# Patient Record
Sex: FEMALE | Race: WHITE | Employment: PART TIME | URBAN - METROPOLITAN AREA
[De-identification: names, ages, dates, MRNs, and addresses within clinical notes are randomized per-mention and may not be internally consistent; named-entity substitution may affect disease eponyms.]

---

## 2020-01-17 ENCOUNTER — HOSPITAL ENCOUNTER (OUTPATIENT)
Dept: PREADMISSION TESTING | Age: 70
Discharge: HOME | End: 2020-01-17
Payer: MEDICARE

## 2020-01-17 VITALS
HEIGHT: 64 IN | BODY MASS INDEX: 30.87 KG/M2 | OXYGEN SATURATION: 96 % | HEART RATE: 93 BPM | DIASTOLIC BLOOD PRESSURE: 69 MMHG | SYSTOLIC BLOOD PRESSURE: 140 MMHG | WEIGHT: 180.8 LBS | TEMPERATURE: 98.2 F | RESPIRATION RATE: 16 BRPM

## 2020-01-17 LAB
ALBUMIN SERPL-MCNC: 4.1 G/DL (ref 3.5–5)
ALBUMIN/GLOB SERPL: 1.3 {RATIO} (ref 1.1–2.2)
ALP SERPL-CCNC: 90 U/L (ref 45–117)
ALT SERPL-CCNC: 22 U/L (ref 12–78)
ANION GAP SERPL CALC-SCNC: 3 MMOL/L (ref 5–15)
APTT PPP: 25.7 SEC (ref 22.1–32)
AST SERPL-CCNC: 20 U/L (ref 15–37)
BASOPHILS # BLD: 0.1 K/UL (ref 0–0.1)
BASOPHILS NFR BLD: 1 % (ref 0–1)
BILIRUB SERPL-MCNC: 0.5 MG/DL (ref 0.2–1)
BUN SERPL-MCNC: 13 MG/DL (ref 6–20)
BUN/CREAT SERPL: 19 (ref 12–20)
CALCIUM SERPL-MCNC: 9 MG/DL (ref 8.5–10.1)
CHLORIDE SERPL-SCNC: 107 MMOL/L (ref 97–108)
CO2 SERPL-SCNC: 30 MMOL/L (ref 21–32)
CREAT SERPL-MCNC: 0.67 MG/DL (ref 0.55–1.02)
DIFFERENTIAL METHOD BLD: ABNORMAL
EOSINOPHIL # BLD: 0 K/UL (ref 0–0.4)
EOSINOPHIL NFR BLD: 1 % (ref 0–7)
ERYTHROCYTE [DISTWIDTH] IN BLOOD BY AUTOMATED COUNT: 12.2 % (ref 11.5–14.5)
GLOBULIN SER CALC-MCNC: 3.2 G/DL (ref 2–4)
GLUCOSE SERPL-MCNC: 96 MG/DL (ref 65–100)
HCT VFR BLD AUTO: 44.4 % (ref 35–47)
HGB BLD-MCNC: 14.3 G/DL (ref 11.5–16)
IMM GRANULOCYTES # BLD AUTO: 0.1 K/UL (ref 0–0.04)
IMM GRANULOCYTES NFR BLD AUTO: 1 % (ref 0–0.5)
INR PPP: 1 (ref 0.9–1.1)
LYMPHOCYTES # BLD: 1.7 K/UL (ref 0.8–3.5)
LYMPHOCYTES NFR BLD: 22 % (ref 12–49)
MCH RBC QN AUTO: 29.6 PG (ref 26–34)
MCHC RBC AUTO-ENTMCNC: 32.2 G/DL (ref 30–36.5)
MCV RBC AUTO: 91.9 FL (ref 80–99)
MONOCYTES # BLD: 0.5 K/UL (ref 0–1)
MONOCYTES NFR BLD: 7 % (ref 5–13)
NEUTS SEG # BLD: 5.4 K/UL (ref 1.8–8)
NEUTS SEG NFR BLD: 68 % (ref 32–75)
NRBC # BLD: 0 K/UL (ref 0–0.01)
NRBC BLD-RTO: 0 PER 100 WBC
PLATELET # BLD AUTO: 237 K/UL (ref 150–400)
PMV BLD AUTO: 9.6 FL (ref 8.9–12.9)
POTASSIUM SERPL-SCNC: 4.2 MMOL/L (ref 3.5–5.1)
PROT SERPL-MCNC: 7.3 G/DL (ref 6.4–8.2)
PROTHROMBIN TIME: 9.8 SEC (ref 9–11.1)
RBC # BLD AUTO: 4.83 M/UL (ref 3.8–5.2)
SODIUM SERPL-SCNC: 140 MMOL/L (ref 136–145)
THERAPEUTIC RANGE,PTTT: NORMAL SECS (ref 58–77)
WBC # BLD AUTO: 7.7 K/UL (ref 3.6–11)

## 2020-01-17 PROCEDURE — 85610 PROTHROMBIN TIME: CPT

## 2020-01-17 PROCEDURE — 36415 COLL VENOUS BLD VENIPUNCTURE: CPT

## 2020-01-17 PROCEDURE — 85025 COMPLETE CBC W/AUTO DIFF WBC: CPT

## 2020-01-17 PROCEDURE — 80053 COMPREHEN METABOLIC PANEL: CPT

## 2020-01-17 PROCEDURE — 85730 THROMBOPLASTIN TIME PARTIAL: CPT

## 2020-01-17 RX ORDER — LANOLIN ALCOHOL/MO/W.PET/CERES
1 CREAM (GRAM) TOPICAL
COMMUNITY

## 2020-01-17 RX ORDER — CHOLECALCIFEROL (VITAMIN D3) 125 MCG
440 CAPSULE ORAL
COMMUNITY
End: 2020-01-30

## 2020-01-17 RX ORDER — CYCLOBENZAPRINE HCL 5 MG
5 TABLET ORAL AS NEEDED
COMMUNITY
End: 2022-03-10

## 2020-01-17 RX ORDER — CHOLECALCIFEROL (VITAMIN D3) 125 MCG
200 CAPSULE ORAL DAILY
COMMUNITY

## 2020-01-17 RX ORDER — METHOCARBAMOL 500 MG/1
500 TABLET, FILM COATED ORAL AS NEEDED
COMMUNITY

## 2020-01-17 RX ORDER — ASPIRIN 81 MG/1
81 TABLET ORAL DAILY
COMMUNITY
End: 2020-01-30

## 2020-01-17 RX ORDER — CHOLECALCIFEROL (VITAMIN D3) 125 MCG
1 CAPSULE ORAL DAILY
COMMUNITY

## 2020-01-17 RX ORDER — ROSUVASTATIN CALCIUM 10 MG/1
10 TABLET, COATED ORAL
COMMUNITY

## 2020-01-17 RX ORDER — HYDROGEN PEROXIDE 3 %
20 SOLUTION, NON-ORAL MISCELLANEOUS DAILY
COMMUNITY

## 2020-01-17 NOTE — PERIOP NOTES
[address] , [address] [facility]   MAIN OR                                  [phone]   MAIN PRE OP                          [phone]                                                                                AMBULATORY PRE OP          [phone]  PRE-ADMISSION TESTING    [phone]   Surgery Date: Wednesday -  1/29/20        Is surgery arrival time given by surgeon? NO  If NO, [address] [facility] staff will call you between 3 and 7pm the day before your surgery with your arrival time. (If your surgery is on a Monday, we will call you the Friday before.)    Call [phone] after 7pm Monday-Friday if you did not receive this call. INSTRUCTIONS BEFORE YOUR SURGERY   When You  Arrive Arrive at the [address] [facility] on the day of your surgery  Have your insurance card, photo ID, and any copayment (if needed)   Food   and   Drink NO food or drink after midnight the night before surgery    This means NO water, gum, mints, coffee, juice, etc.  No alcohol (beer, wine, liquor) 24 hours before and after surgery   Medications to   TAKE   Morning of Surgery MEDICATIONS TO TAKE THE MORNING OF SURGERY WITH A SIP OF WATER:    Nexium      Medications  To  STOP      7 days before surgery  Non-Steroidal anti-inflammatory Drugs (NSAID's): for example, Ibuprofen (Advil, Motrin), Naproxen (Aleve)   Aspirin, if taking for pain    Herbal supplements, vitamins, and fish oil   Other:  (Pain medications not listed above, including Tylenol may be taken)  Please stop all vitamins and supplements after 1/22 dose until after surgery   Blood  Thinners  If you take  Aspirin, Plavix, Coumadin, or any blood-thinning or anti-blood clot medicine, talk to the doctor who prescribed the medications for pre-operative instructions.    Stop Aspirin 81mg after 1/22 dose until after surgery   Bathing Clothing  Jewelry  Valuables      If you shower the morning of surgery, please do not apply anything to your skin (lotions, powders, deodorant, or makeup, especially mascara)   Follow Chlorhexidine Care Fusion body wash instructions provided to you during PAT appointment. Begin 3 days prior to surgery.  Do not shave or trim anywhere 24 hours before surgery   Wear your hair loose or down; no pony-tails, buns, or metal hair clips   Wear loose, comfortable, clean clothes   Wear glasses instead of contacts   Leave money, valuables, and jewelry, including body piercings, at home   Going Home - or Spending the Night  SAME-DAY SURGERY: You must have a responsible adult drive you home and stay with you 24 hours after surgery   ADMITS: If your doctor is keeping you in the hospital after surgery, leave personal belongings/luggage in your car until you have a hospital room number. Hospital discharge time is 12 noon  Drivers must be here before 12 noon unless you are told differently   Special Instructions Chlor-hex  wash reviewed, incentive spirometry/deep breathing, leg exercises to prevent DVT, s/s of infection & what to report to MD all reviewed with patient    Pt verified understanding by teach back and return demonstration     Follow all instructions so your surgery wont be cancelled. Please, be on time. If a situation occurs and you are delayed the day of surgery, call [phone] . If your physical condition changes (like a fever, cold, flu, etc.) call your surgeon. Home medication(s) reviewed and verified per written list from patient during PAT appointment. The patient was contacted  in person. The patient verbalizes understanding of all instructions and does not  need reinforcement.

## 2020-01-28 ENCOUNTER — ANESTHESIA EVENT (OUTPATIENT)
Dept: SURGERY | Age: 70
End: 2020-01-28
Payer: MEDICARE

## 2020-01-29 ENCOUNTER — ANESTHESIA (OUTPATIENT)
Dept: SURGERY | Age: 70
End: 2020-01-29
Payer: MEDICARE

## 2020-01-29 ENCOUNTER — HOSPITAL ENCOUNTER (OUTPATIENT)
Age: 70
Setting detail: OBSERVATION
Discharge: HOME | End: 2020-01-30
Attending: SURGERY | Admitting: SURGERY
Payer: MEDICARE

## 2020-01-29 PROBLEM — N62 MACROMASTIA: Status: ACTIVE | Noted: 2020-01-29

## 2020-01-29 PROCEDURE — 77030013079 HC BLNKT BAIR HGGR 3M -A: Performed by: ANESTHESIOLOGY

## 2020-01-29 PROCEDURE — 74011000250 HC RX REV CODE- 250: Performed by: PHYSICIAN ASSISTANT

## 2020-01-29 PROCEDURE — 77030040179 HC DEV DRSG WND PICO S&N -C: Performed by: SURGERY

## 2020-01-29 PROCEDURE — 77030020255 HC SOL INJ LR 1000ML BG: Performed by: SURGERY

## 2020-01-29 PROCEDURE — 77030008684 HC TU ET CUF COVD -B: Performed by: ANESTHESIOLOGY

## 2020-01-29 PROCEDURE — 77030008534 HC TBNG LIPOSUC BYRO -B: Performed by: SURGERY

## 2020-01-29 PROCEDURE — 74011250637 HC RX REV CODE- 250/637: Performed by: PHYSICIAN ASSISTANT

## 2020-01-29 PROCEDURE — 77030010507 HC ADH SKN DERMBND J&J -B: Performed by: SURGERY

## 2020-01-29 PROCEDURE — 77030002916 HC SUT ETHLN J&J -A: Performed by: SURGERY

## 2020-01-29 PROCEDURE — 99218 HC RM OBSERVATION: CPT

## 2020-01-29 PROCEDURE — 77030040361 HC SLV COMPR DVT MDII -B

## 2020-01-29 PROCEDURE — 77030026438 HC STYL ET INTUB CARD -A: Performed by: ANESTHESIOLOGY

## 2020-01-29 PROCEDURE — 77030011825 HC SUPP SURG PSTOP S2SG -B: Performed by: SURGERY

## 2020-01-29 PROCEDURE — 77030008463 HC STPLR SKN PROX J&J -B: Performed by: SURGERY

## 2020-01-29 PROCEDURE — 74011250636 HC RX REV CODE- 250/636: Performed by: ANESTHESIOLOGY

## 2020-01-29 PROCEDURE — 77030018836 HC SOL IRR NACL ICUM -A: Performed by: SURGERY

## 2020-01-29 PROCEDURE — 76010000131 HC OR TIME 2 TO 2.5 HR: Performed by: SURGERY

## 2020-01-29 PROCEDURE — 77030027138 HC INCENT SPIROMETER -A

## 2020-01-29 PROCEDURE — 74011250636 HC RX REV CODE- 250/636: Performed by: NURSE ANESTHETIST, CERTIFIED REGISTERED

## 2020-01-29 PROCEDURE — 76060000035 HC ANESTHESIA 2 TO 2.5 HR: Performed by: SURGERY

## 2020-01-29 PROCEDURE — 74011000250 HC RX REV CODE- 250: Performed by: NURSE ANESTHETIST, CERTIFIED REGISTERED

## 2020-01-29 PROCEDURE — 77030040922 HC BLNKT HYPOTHRM STRY -A

## 2020-01-29 PROCEDURE — 77030019908 HC STETH ESOPH SIMS -A: Performed by: ANESTHESIOLOGY

## 2020-01-29 PROCEDURE — 74011250636 HC RX REV CODE- 250/636: Performed by: SURGERY

## 2020-01-29 PROCEDURE — 77030040830 HC CATH URETH FOL MDII -A: Performed by: SURGERY

## 2020-01-29 PROCEDURE — 77030002933 HC SUT MCRYL J&J -A: Performed by: SURGERY

## 2020-01-29 PROCEDURE — 76210000000 HC OR PH I REC 2 TO 2.5 HR: Performed by: SURGERY

## 2020-01-29 PROCEDURE — 74011000250 HC RX REV CODE- 250: Performed by: SURGERY

## 2020-01-29 PROCEDURE — 77030025162 HC DRSG VAC ASST 1 KCON -B: Performed by: SURGERY

## 2020-01-29 PROCEDURE — 74011250636 HC RX REV CODE- 250/636: Performed by: PHYSICIAN ASSISTANT

## 2020-01-29 PROCEDURE — 77030031139 HC SUT VCRL2 J&J -A: Performed by: SURGERY

## 2020-01-29 PROCEDURE — 77030002986 HC SUT PROL J&J -A: Performed by: SURGERY

## 2020-01-29 PROCEDURE — 77030028700 HC BLD TISS PLSM MEDT -E: Performed by: SURGERY

## 2020-01-29 PROCEDURE — 88305 TISSUE EXAM BY PATHOLOGIST: CPT

## 2020-01-29 PROCEDURE — 77030011640 HC PAD GRND REM COVD -A: Performed by: SURGERY

## 2020-01-29 PROCEDURE — 77030019940 HC BLNKT HYPOTHRM STRY -B: Performed by: SURGERY

## 2020-01-29 PROCEDURE — 77030040506 HC DRN WND MDII -A: Performed by: SURGERY

## 2020-01-29 PROCEDURE — 74011000272 HC RX REV CODE- 272: Performed by: SURGERY

## 2020-01-29 PROCEDURE — 77030020262 HC SOL INJ SOD CL 0.9% 100ML: Performed by: SURGERY

## 2020-01-29 RX ORDER — ALBUTEROL SULFATE 0.83 MG/ML
2.5 SOLUTION RESPIRATORY (INHALATION) AS NEEDED
Status: DISCONTINUED | OUTPATIENT
Start: 2020-01-29 | End: 2020-01-29 | Stop reason: HOSPADM

## 2020-01-29 RX ORDER — SODIUM CHLORIDE 0.9 % (FLUSH) 0.9 %
5-40 SYRINGE (ML) INJECTION EVERY 8 HOURS
Status: DISCONTINUED | OUTPATIENT
Start: 2020-01-29 | End: 2020-01-30 | Stop reason: HOSPADM

## 2020-01-29 RX ORDER — ONDANSETRON 4 MG/1
4 TABLET, ORALLY DISINTEGRATING ORAL
Status: DISCONTINUED | OUTPATIENT
Start: 2020-01-29 | End: 2020-01-30 | Stop reason: HOSPADM

## 2020-01-29 RX ORDER — PROCHLORPERAZINE EDISYLATE 5 MG/ML
10 INJECTION INTRAMUSCULAR; INTRAVENOUS
Status: DISCONTINUED | OUTPATIENT
Start: 2020-01-29 | End: 2020-01-30 | Stop reason: HOSPADM

## 2020-01-29 RX ORDER — SODIUM CHLORIDE 0.9 % (FLUSH) 0.9 %
5-40 SYRINGE (ML) INJECTION AS NEEDED
Status: DISCONTINUED | OUTPATIENT
Start: 2020-01-29 | End: 2020-01-30 | Stop reason: HOSPADM

## 2020-01-29 RX ORDER — FENTANYL CITRATE 50 UG/ML
INJECTION, SOLUTION INTRAMUSCULAR; INTRAVENOUS AS NEEDED
Status: DISCONTINUED | OUTPATIENT
Start: 2020-01-29 | End: 2020-01-29 | Stop reason: HOSPADM

## 2020-01-29 RX ORDER — FENTANYL CITRATE 50 UG/ML
25 INJECTION, SOLUTION INTRAMUSCULAR; INTRAVENOUS
Status: DISCONTINUED | OUTPATIENT
Start: 2020-01-29 | End: 2020-01-29

## 2020-01-29 RX ORDER — NALOXONE HYDROCHLORIDE 0.4 MG/ML
0.04 INJECTION, SOLUTION INTRAMUSCULAR; INTRAVENOUS; SUBCUTANEOUS
Status: DISCONTINUED | OUTPATIENT
Start: 2020-01-29 | End: 2020-01-29 | Stop reason: HOSPADM

## 2020-01-29 RX ORDER — SODIUM CHLORIDE 0.9 % (FLUSH) 0.9 %
5-40 SYRINGE (ML) INJECTION AS NEEDED
Status: DISCONTINUED | OUTPATIENT
Start: 2020-01-29 | End: 2020-01-29 | Stop reason: HOSPADM

## 2020-01-29 RX ORDER — PROPOFOL 10 MG/ML
INJECTION, EMULSION INTRAVENOUS
Status: DISCONTINUED | OUTPATIENT
Start: 2020-01-29 | End: 2020-01-29 | Stop reason: HOSPADM

## 2020-01-29 RX ORDER — ROCURONIUM BROMIDE 10 MG/ML
INJECTION, SOLUTION INTRAVENOUS AS NEEDED
Status: DISCONTINUED | OUTPATIENT
Start: 2020-01-29 | End: 2020-01-29 | Stop reason: HOSPADM

## 2020-01-29 RX ORDER — MORPHINE SULFATE 2 MG/ML
2 INJECTION, SOLUTION INTRAMUSCULAR; INTRAVENOUS
Status: DISCONTINUED | OUTPATIENT
Start: 2020-01-29 | End: 2020-01-30 | Stop reason: HOSPADM

## 2020-01-29 RX ORDER — PANTOPRAZOLE SODIUM 40 MG/1
40 GRANULE, DELAYED RELEASE ORAL
Status: DISCONTINUED | OUTPATIENT
Start: 2020-01-30 | End: 2020-01-29

## 2020-01-29 RX ORDER — SODIUM CHLORIDE 0.9 % (FLUSH) 0.9 %
5-40 SYRINGE (ML) INJECTION EVERY 8 HOURS
Status: DISCONTINUED | OUTPATIENT
Start: 2020-01-29 | End: 2020-01-29 | Stop reason: HOSPADM

## 2020-01-29 RX ORDER — HYDROMORPHONE HCL/0.9% NACL/PF 0.5 MG/ML
PLASTIC BAG, INJECTION (ML) INTRAVENOUS CONTINUOUS
Status: DISCONTINUED | OUTPATIENT
Start: 2020-01-29 | End: 2020-01-30

## 2020-01-29 RX ORDER — SODIUM CHLORIDE, SODIUM LACTATE, POTASSIUM CHLORIDE, CALCIUM CHLORIDE 600; 310; 30; 20 MG/100ML; MG/100ML; MG/100ML; MG/100ML
125 INJECTION, SOLUTION INTRAVENOUS CONTINUOUS
Status: DISCONTINUED | OUTPATIENT
Start: 2020-01-29 | End: 2020-01-29 | Stop reason: HOSPADM

## 2020-01-29 RX ORDER — HYDROMORPHONE HYDROCHLORIDE 1 MG/ML
.25-1 INJECTION, SOLUTION INTRAMUSCULAR; INTRAVENOUS; SUBCUTANEOUS
Status: DISCONTINUED | OUTPATIENT
Start: 2020-01-29 | End: 2020-01-29 | Stop reason: HOSPADM

## 2020-01-29 RX ORDER — PROPOFOL 10 MG/ML
INJECTION, EMULSION INTRAVENOUS AS NEEDED
Status: DISCONTINUED | OUTPATIENT
Start: 2020-01-29 | End: 2020-01-29 | Stop reason: HOSPADM

## 2020-01-29 RX ORDER — LIDOCAINE HYDROCHLORIDE 10 MG/ML
0.1 INJECTION, SOLUTION EPIDURAL; INFILTRATION; INTRACAUDAL; PERINEURAL AS NEEDED
Status: DISCONTINUED | OUTPATIENT
Start: 2020-01-29 | End: 2020-01-29 | Stop reason: HOSPADM

## 2020-01-29 RX ORDER — NALOXONE HYDROCHLORIDE 0.4 MG/ML
0.4 INJECTION, SOLUTION INTRAMUSCULAR; INTRAVENOUS; SUBCUTANEOUS AS NEEDED
Status: DISCONTINUED | OUTPATIENT
Start: 2020-01-29 | End: 2020-01-30 | Stop reason: HOSPADM

## 2020-01-29 RX ORDER — FLUMAZENIL 0.1 MG/ML
0.2 INJECTION INTRAVENOUS
Status: DISCONTINUED | OUTPATIENT
Start: 2020-01-29 | End: 2020-01-29 | Stop reason: HOSPADM

## 2020-01-29 RX ORDER — GLYCOPYRROLATE 0.2 MG/ML
INJECTION INTRAMUSCULAR; INTRAVENOUS AS NEEDED
Status: DISCONTINUED | OUTPATIENT
Start: 2020-01-29 | End: 2020-01-29 | Stop reason: HOSPADM

## 2020-01-29 RX ORDER — ONDANSETRON 2 MG/ML
4 INJECTION INTRAMUSCULAR; INTRAVENOUS AS NEEDED
Status: DISCONTINUED | OUTPATIENT
Start: 2020-01-29 | End: 2020-01-29 | Stop reason: HOSPADM

## 2020-01-29 RX ORDER — AMOXICILLIN AND CLAVULANATE POTASSIUM 875; 125 MG/1; MG/1
TABLET, FILM COATED ORAL
Status: ON HOLD | COMMUNITY
Start: 2020-01-24 | End: 2022-03-17

## 2020-01-29 RX ORDER — SODIUM CHLORIDE 9 MG/ML
75 INJECTION, SOLUTION INTRAVENOUS CONTINUOUS
Status: DISCONTINUED | OUTPATIENT
Start: 2020-01-29 | End: 2020-01-30

## 2020-01-29 RX ORDER — DIPHENHYDRAMINE HYDROCHLORIDE 50 MG/ML
12.5 INJECTION, SOLUTION INTRAMUSCULAR; INTRAVENOUS AS NEEDED
Status: DISCONTINUED | OUTPATIENT
Start: 2020-01-29 | End: 2020-01-29 | Stop reason: HOSPADM

## 2020-01-29 RX ORDER — DOCUSATE SODIUM 100 MG/1
100 CAPSULE, LIQUID FILLED ORAL 2 TIMES DAILY
Status: DISCONTINUED | OUTPATIENT
Start: 2020-01-29 | End: 2020-01-30 | Stop reason: HOSPADM

## 2020-01-29 RX ORDER — ONDANSETRON 2 MG/ML
INJECTION INTRAMUSCULAR; INTRAVENOUS AS NEEDED
Status: DISCONTINUED | OUTPATIENT
Start: 2020-01-29 | End: 2020-01-29 | Stop reason: HOSPADM

## 2020-01-29 RX ORDER — EPHEDRINE SULFATE/0.9% NACL/PF 50 MG/5 ML
SYRINGE (ML) INTRAVENOUS AS NEEDED
Status: DISCONTINUED | OUTPATIENT
Start: 2020-01-29 | End: 2020-01-29 | Stop reason: HOSPADM

## 2020-01-29 RX ORDER — DEXAMETHASONE SODIUM PHOSPHATE 4 MG/ML
INJECTION, SOLUTION INTRA-ARTICULAR; INTRALESIONAL; INTRAMUSCULAR; INTRAVENOUS; SOFT TISSUE AS NEEDED
Status: DISCONTINUED | OUTPATIENT
Start: 2020-01-29 | End: 2020-01-29 | Stop reason: HOSPADM

## 2020-01-29 RX ORDER — ACETAMINOPHEN 325 MG/1
650 TABLET ORAL
Status: DISCONTINUED | OUTPATIENT
Start: 2020-01-29 | End: 2020-01-30 | Stop reason: HOSPADM

## 2020-01-29 RX ORDER — ROSUVASTATIN CALCIUM 10 MG/1
10 TABLET, COATED ORAL
Status: DISCONTINUED | OUTPATIENT
Start: 2020-01-29 | End: 2020-01-30 | Stop reason: HOSPADM

## 2020-01-29 RX ORDER — PANTOPRAZOLE SODIUM 40 MG/1
40 TABLET, DELAYED RELEASE ORAL
Status: DISCONTINUED | OUTPATIENT
Start: 2020-01-30 | End: 2020-01-30 | Stop reason: HOSPADM

## 2020-01-29 RX ORDER — ENOXAPARIN SODIUM 100 MG/ML
40 INJECTION SUBCUTANEOUS EVERY 24 HOURS
Status: DISCONTINUED | OUTPATIENT
Start: 2020-01-30 | End: 2020-01-30 | Stop reason: HOSPADM

## 2020-01-29 RX ORDER — DIPHENHYDRAMINE HCL 25 MG
25 CAPSULE ORAL
Status: DISCONTINUED | OUTPATIENT
Start: 2020-01-29 | End: 2020-01-30 | Stop reason: HOSPADM

## 2020-01-29 RX ORDER — DIAZEPAM 5 MG/1
5 TABLET ORAL
Status: DISCONTINUED | OUTPATIENT
Start: 2020-01-29 | End: 2020-01-30 | Stop reason: HOSPADM

## 2020-01-29 RX ORDER — NEOSTIGMINE METHYLSULFATE 1 MG/ML
INJECTION INTRAVENOUS AS NEEDED
Status: DISCONTINUED | OUTPATIENT
Start: 2020-01-29 | End: 2020-01-29 | Stop reason: HOSPADM

## 2020-01-29 RX ADMIN — DOCUSATE SODIUM 100 MG: 100 CAPSULE, LIQUID FILLED ORAL at 19:17

## 2020-01-29 RX ADMIN — PROCHLORPERAZINE EDISYLATE 10 MG: 5 INJECTION INTRAMUSCULAR; INTRAVENOUS at 17:39

## 2020-01-29 RX ADMIN — GLYCOPYRROLATE 0.4 MG: 0.2 INJECTION, SOLUTION INTRAMUSCULAR; INTRAVENOUS at 12:33

## 2020-01-29 RX ADMIN — HYDROMORPHONE HYDROCHLORIDE 0.5 MG: 1 INJECTION, SOLUTION INTRAMUSCULAR; INTRAVENOUS; SUBCUTANEOUS at 13:51

## 2020-01-29 RX ADMIN — FENTANYL CITRATE 25 MCG: 50 INJECTION, SOLUTION INTRAMUSCULAR; INTRAVENOUS at 12:39

## 2020-01-29 RX ADMIN — FENTANYL CITRATE 25 MCG: 50 INJECTION, SOLUTION INTRAMUSCULAR; INTRAVENOUS at 12:46

## 2020-01-29 RX ADMIN — FENTANYL CITRATE 50 MCG: 50 INJECTION, SOLUTION INTRAMUSCULAR; INTRAVENOUS at 12:52

## 2020-01-29 RX ADMIN — FENTANYL CITRATE 50 MCG: 50 INJECTION, SOLUTION INTRAMUSCULAR; INTRAVENOUS at 11:45

## 2020-01-29 RX ADMIN — SODIUM CHLORIDE 75 ML/HR: 900 INJECTION, SOLUTION INTRAVENOUS at 13:45

## 2020-01-29 RX ADMIN — ROCURONIUM BROMIDE 50 MG: 50 INJECTION, SOLUTION INTRAVENOUS at 10:41

## 2020-01-29 RX ADMIN — FENTANYL CITRATE 50 MCG: 50 INJECTION, SOLUTION INTRAMUSCULAR; INTRAVENOUS at 10:34

## 2020-01-29 RX ADMIN — SODIUM CHLORIDE, SODIUM LACTATE, POTASSIUM CHLORIDE, AND CALCIUM CHLORIDE: 600; 310; 30; 20 INJECTION, SOLUTION INTRAVENOUS at 11:55

## 2020-01-29 RX ADMIN — ROSUVASTATIN CALCIUM 10 MG: 10 TABLET, COATED ORAL at 21:32

## 2020-01-29 RX ADMIN — ONDANSETRON 4 MG: 2 INJECTION INTRAMUSCULAR; INTRAVENOUS at 14:52

## 2020-01-29 RX ADMIN — FENTANYL CITRATE 50 MCG: 50 INJECTION, SOLUTION INTRAMUSCULAR; INTRAVENOUS at 11:26

## 2020-01-29 RX ADMIN — ONDANSETRON 4 MG: 4 TABLET, ORALLY DISINTEGRATING ORAL at 19:22

## 2020-01-29 RX ADMIN — WATER 2 G: 1 INJECTION INTRAMUSCULAR; INTRAVENOUS; SUBCUTANEOUS at 19:17

## 2020-01-29 RX ADMIN — Medication 10 MG: at 11:03

## 2020-01-29 RX ADMIN — PROPOFOL 100 MCG/KG/MIN: 10 INJECTION, EMULSION INTRAVENOUS at 10:45

## 2020-01-29 RX ADMIN — NEOSTIGMINE METHYLSULFATE 3 MG: 1 INJECTION, SOLUTION INTRAVENOUS at 12:33

## 2020-01-29 RX ADMIN — ONDANSETRON 4 MG: 2 INJECTION INTRAMUSCULAR; INTRAVENOUS at 12:25

## 2020-01-29 RX ADMIN — SODIUM CHLORIDE, SODIUM LACTATE, POTASSIUM CHLORIDE, AND CALCIUM CHLORIDE: 600; 310; 30; 20 INJECTION, SOLUTION INTRAVENOUS at 10:33

## 2020-01-29 RX ADMIN — PROPOFOL 30 MG: 10 INJECTION, EMULSION INTRAVENOUS at 12:33

## 2020-01-29 RX ADMIN — PROPOFOL 20 MG: 10 INJECTION, EMULSION INTRAVENOUS at 10:42

## 2020-01-29 RX ADMIN — HYDROMORPHONE HYDROCHLORIDE 0.5 MG: 1 INJECTION, SOLUTION INTRAMUSCULAR; INTRAVENOUS; SUBCUTANEOUS at 13:25

## 2020-01-29 RX ADMIN — ROCURONIUM BROMIDE 30 MG: 50 INJECTION, SOLUTION INTRAVENOUS at 11:45

## 2020-01-29 RX ADMIN — PROPOFOL 120 MG: 10 INJECTION, EMULSION INTRAVENOUS at 10:41

## 2020-01-29 RX ADMIN — SODIUM CHLORIDE, SODIUM LACTATE, POTASSIUM CHLORIDE, AND CALCIUM CHLORIDE 125 ML/HR: 600; 310; 30; 20 INJECTION, SOLUTION INTRAVENOUS at 09:20

## 2020-01-29 RX ADMIN — FENTANYL CITRATE 50 MCG: 50 INJECTION, SOLUTION INTRAMUSCULAR; INTRAVENOUS at 11:11

## 2020-01-29 RX ADMIN — FENTANYL CITRATE 50 MCG: 50 INJECTION, SOLUTION INTRAMUSCULAR; INTRAVENOUS at 10:33

## 2020-01-29 RX ADMIN — WATER 2 G: 1 INJECTION INTRAMUSCULAR; INTRAVENOUS; SUBCUTANEOUS at 10:54

## 2020-01-29 RX ADMIN — Medication: at 13:31

## 2020-01-29 RX ADMIN — Medication 10 ML: at 21:32

## 2020-01-29 RX ADMIN — PROPOFOL 30 MG: 10 INJECTION, EMULSION INTRAVENOUS at 12:39

## 2020-01-29 RX ADMIN — DEXAMETHASONE SODIUM PHOSPHATE 8 MG: 4 INJECTION, SOLUTION INTRAMUSCULAR; INTRAVENOUS at 10:56

## 2020-01-29 NOTE — BRIEF OP NOTE
BRIEF OPERATIVE NOTE    Date of Procedure: 1/29/2020   Preoperative Diagnosis: MACROMASTIA  Postoperative Diagnosis: MACROMASTIA    Procedure(s):  BILATERAL BREAST REDUCTION  Surgeon(s) and Role:     * Cathi Peres MD - Primary         Surgical Assistant: anne jeff    Surgical Staff:  Circ-1: Thao Saldivar, EVELINE; Norva Bamberger, EVELINE  Physician Assistant: Andrew Steel PA-C  Scrub Tech-1: Sergey Villeda  Surg Asst-1: Ryan Little  Event Time In Time Out   Incision Start 1110    Incision Close       Anesthesia: General   Estimated Blood Loss: 50  Specimens:   ID Type Source Tests Collected by Time Destination   1 : excised right breast tissue  Preservative Breast  Cathi Peres MD 1/29/2020 1127 Pathology   2 : excised left breast tissue Preservative Breast  Cathi Peres MD 1/29/2020 1128 Pathology      Findings: 0   Complications: 0  Implants: * No implants in log *

## 2020-01-29 NOTE — ANESTHESIA POSTPROCEDURE EVALUATION
Procedure(s):  BILATERAL BREAST REDUCTION. general    Anesthesia Post Evaluation        Patient location during evaluation: bedside  Level of consciousness: awake  Pain management: satisfactory to patient  Airway patency: patent  Anesthetic complications: no  Cardiovascular status: acceptable  Respiratory status: acceptable  Hydration status: acceptable        Vitals Value Taken Time   /77 1/29/2020  2:16 PM   Temp 36.6 °C (97.8 °F) 1/29/2020  1:05 PM   Pulse 93 1/29/2020  2:17 PM   Resp 20 1/29/2020  2:17 PM   SpO2 96 % 1/29/2020  2:17 PM   Vitals shown include unvalidated device data.

## 2020-01-29 NOTE — PROGRESS NOTES
TRANSFER - IN REPORT:    Verbal report received from Chelsey(name) on Ira Wood  being received from [facility]) for routine post - op      Report consisted of patients Situation, Background, Assessment and   Recommendations(SBAR). Information from the following report(s) SBAR, Kardex, OR Summary, Intake/Output and MAR was reviewed with the receiving nurse. Opportunity for questions and clarification was provided. Assessment completed upon patients arrival to unit and care assumed.

## 2020-01-29 NOTE — DISCHARGE INSTRUCTIONS
Breast Reduction Patient Instructions    Immediately Following Surgery:    After surgery you will rest quietly for the first 48 hours. You will be able to walk around the house and perform light daily activities; however, during this time, it is not at all unusual for you to feel some pain, soreness and pressure in the chest area. This will gradually subside and Dr. Rahman will give you pain medication to relieve it. You must take the entire prescription of antibiotics. Be sure to lie on your back whenever you rest or sleep. Keep your head elevated for 72 hours after surgery as this will help with swelling. The surgery bra that you have been put in should be worn constantly during the day and at night. You may also wear a soft sports bra with light support instead of the surgery bra if preferred. You will then be allowed to shower two days after surgery. Jes Helm yourself everywhere, including the tapes and your incisions. Use mild soap and pat yourself dry and put the bra back on. This daily routine will help keep the incisions clean, and will promote wound healing. Do not submerge yourself in a bath, swimming pool, or whirlpool for two weeks. You will wear the sports bra for a total of two to three weeks after surgery. The small tape strips covering your incisions. These will remain in place for seven days and will peel off by themselves. A few patients react to the anesthetic after surgery with nausea and vomiting. This usually lasts less than 24 hours and should be treated with lots of fluids. Dr. Rahman will prescribe nausea medicine for during your preoperative visit. The maximum swelling occurs at about three days and then begins to dramatically improve. Mild bruising typically resolves within 14 days. You should plan to be off work for 1-2 weeks, although this can vary from person to person.     Additional Post-Operative Instructions:    No heavy exercise or lifting for three weeks following surgery. For the first three days following surgery you should try to restrict your arm movements. Move your arms slowly and avoid sudden jerky movements of the chest and breast area. Keep your arms as close to your body as possible. Dr. Allison Marlow encourages walking immediately after surgery. This activity will greatly minimize the risk of blood clots in your leg veins. Do not expose your breasts to the sun for eight weeks after surgery. Please notify Dr. Allison Marlow if:   If your one breast becomes significantly larger than the other   If you develop significant bruising across the chest    If you experience a significant increase in pain in the breast after the pain has improved   If you develop a temperature above 101.5° F   If you develop redness (like a sunburn) around your incisions  If you need help or have any questions feel free to call Dr Allison Marlow with your concerns. Dr. Allison Marlow is on call 24 hours per day, seven days per week and has an answering service to forward calls. The quality of your cosmetic enhancement may be compromised if you fail to return for any scheduled post-op visits, or follow the pre- and post-operative instructions. Please dont hesitate to report any unusual or concerning changes. Our number is [phone].

## 2020-01-29 NOTE — PROGRESS NOTES
Problem: Falls - Risk of  Goal: *Absence of Falls  Description  Document Talita Yoshi Fall Risk and appropriate interventions in the flowsheet.   Outcome: Progressing Towards Goal  Note: Fall Risk Interventions:  Mobility Interventions: Communicate number of staff needed for ambulation/transfer         Medication Interventions: Patient to call before getting OOB    Elimination Interventions: Call light in reach              Problem: Patient Education: Go to Patient Education Activity  Goal: Patient/Family Education  Outcome: Progressing Towards Goal

## 2020-01-29 NOTE — PROGRESS NOTES
Problem: Falls - Risk of  Goal: *Absence of Falls  Description  Document Talita Belle Fall Risk and appropriate interventions in the flowsheet.   Outcome: Progressing Towards Goal  Note: Fall Risk Interventions:  Mobility Interventions: Communicate number of staff needed for ambulation/transfer         Medication Interventions: Patient to call before getting OOB    Elimination Interventions: Call light in reach

## 2020-01-29 NOTE — ANESTHESIA PREPROCEDURE EVALUATION
Relevant Problems   No relevant active problems       Anesthetic History   No history of anesthetic complications            Review of Systems / Medical History  Patient summary reviewed, nursing notes reviewed and pertinent labs reviewed    Pulmonary  Within defined limits                 Neuro/Psych   Within defined limits           Cardiovascular  Within defined limits                     GI/Hepatic/Renal     GERD: well controlled           Endo/Other        Arthritis     Other Findings              Physical Exam    Airway  Mallampati: II  TM Distance: 4 - 6 cm  Neck ROM: normal range of motion   Mouth opening: Normal     Cardiovascular    Rhythm: regular  Rate: normal         Dental  No notable dental hx       Pulmonary  Breath sounds clear to auscultation               Abdominal         Other Findings            Anesthetic Plan    ASA: 2  Anesthesia type: general            Anesthetic plan and risks discussed with: Patient

## 2020-01-29 NOTE — H&P
History and Physical    Patient is a 77 y.o. well-developed, well nourished female who presents for bilateral breast reduction. Past Medical History:   Diagnosis Date    Arthritis     left knee    Cholesterosis     Chronic pain     lower and upper back pain    GERD (gastroesophageal reflux disease)     on medication, does well    Osteopenia 2019    spine    Osteoporosis     of left hip     Past Surgical History:   Procedure Laterality Date    BREAST SURGERY PROCEDURE UNLISTED Right 2001    Biopsy - negative    HX CHOLECYSTECTOMY  2002    HX HEENT Bilateral     cataracts    HX ORTHOPAEDIC Left 2017    ORIF    HX MATTIE AND BSO  2004     Prior to Admission medications    Medication Sig Start Date End Date Taking? Authorizing Provider   aspirin delayed-release 81 mg tablet Take 81 mg by mouth daily. Provider, Historical   calcium citrate-vitamin d3 (CITRACAL+D) 315-200 mg-unit tab Take 1 Tab by mouth daily (with breakfast). Provider, Historical   cyclobenzaprine (FLEXERIL) 5 mg tablet Take 5 mg by mouth as needed for Muscle Spasm(s). Provider, Historical   esomeprazole (NEXIUM) 20 mg capsule Take 20 mg by mouth daily. Provider, Historical   methocarbamol (ROBAXIN) 500 mg tablet Take 500 mg by mouth as needed for Muscle Spasm(s). Provider, Historical   naproxen sodium (ALEVE) 220 mg cap Take 440 mg by mouth every six to eight (6-8) hours as needed. Provider, Historical   rosuvastatin (CRESTOR) 10 mg tablet Take 10 mg by mouth nightly. Provider, Historical   co-enzyme Q-10 (COQ-10) 100 mg capsule Take 200 mg by mouth daily. Provider, Historical   cholecalciferol, vitamin D3, (VITAMIN D3) 50 mcg (2,000 unit) tab Take 1 Tab by mouth daily.     Provider, Historical     Allergies   Allergen Reactions    Codeine Hives    Famotidine Rash    Hydrocodone Hives    Midazolam Nausea and Vomiting    Omeprazole Rash    Oxycodone Hives    Sucralose Rash       General:   No apparent distress. Heart:  Regular rate and rhythm without murmurs or rubs. Lungs:  Clear to ausculation bilaterally; no wheezes, rales or rhonchi. Patient is ready to go to surgery.     Aixa Shannon MD  1/29/2020

## 2020-01-29 NOTE — PERIOP NOTES
Pt's spouse updated on pt's care with plan for admission with understanding being verbalized. Awaiting hand-off to floor. Spouse now at bedside.

## 2020-01-29 NOTE — PERIOP NOTES
TRANSFER - OUT REPORT:    Verbal report given to [facility] EVELINE LOONEY on Aurea Rivera  being transferred to [facility] for routine post - op       Report consisted of patients Situation, Background, Assessment and   Recommendations(SBAR). Information from the following report(s) SBAR, Kardex, Procedure Summary, Intake/Output and MAR was reviewed with the receiving nurse. Lines:   Peripheral IV 01/29/20 Right Wrist (Active)   Site Assessment Clean, dry, & intact 1/29/2020  2:00 PM   Phlebitis Assessment 0 1/29/2020  2:00 PM   Infiltration Assessment 0 1/29/2020  2:00 PM   Dressing Status Clean, dry, & intact 1/29/2020  2:00 PM   Dressing Type Tape;Transparent 1/29/2020  2:00 PM   Hub Color/Line Status Pink; Infusing;Patent 1/29/2020  2:00 PM   Action Taken Open ports on tubing capped 1/29/2020  2:00 PM   Alcohol Cap Used Yes 1/29/2020  2:00 PM        Opportunity for questions and clarification was provided. Patient transported with:   O2 @ 2 liters  Registered Nurse  Will wait approx 15 minutes to transport due to room finishing cleaning.

## 2020-01-30 VITALS
SYSTOLIC BLOOD PRESSURE: 114 MMHG | HEART RATE: 72 BPM | BODY MASS INDEX: 31.03 KG/M2 | OXYGEN SATURATION: 95 % | TEMPERATURE: 98.1 F | WEIGHT: 180.8 LBS | DIASTOLIC BLOOD PRESSURE: 71 MMHG | RESPIRATION RATE: 16 BRPM

## 2020-01-30 PROCEDURE — 74011250637 HC RX REV CODE- 250/637: Performed by: PHYSICIAN ASSISTANT

## 2020-01-30 PROCEDURE — 74011000250 HC RX REV CODE- 250: Performed by: PHYSICIAN ASSISTANT

## 2020-01-30 PROCEDURE — 74011250636 HC RX REV CODE- 250/636: Performed by: PHYSICIAN ASSISTANT

## 2020-01-30 PROCEDURE — 99218 HC RM OBSERVATION: CPT

## 2020-01-30 PROCEDURE — 94760 N-INVAS EAR/PLS OXIMETRY 1: CPT

## 2020-01-30 RX ORDER — HYDROMORPHONE HYDROCHLORIDE 2 MG/1
2 TABLET ORAL
Status: DISCONTINUED | OUTPATIENT
Start: 2020-01-30 | End: 2020-01-30 | Stop reason: HOSPADM

## 2020-01-30 RX ORDER — SILVER SULFADIAZINE 10 G/1000G
CREAM TOPICAL 2 TIMES DAILY
Status: DISCONTINUED | OUTPATIENT
Start: 2020-01-30 | End: 2020-01-30 | Stop reason: HOSPADM

## 2020-01-30 RX ADMIN — WATER 2 G: 1 INJECTION INTRAMUSCULAR; INTRAVENOUS; SUBCUTANEOUS at 02:07

## 2020-01-30 RX ADMIN — PANTOPRAZOLE SODIUM 40 MG: 40 TABLET, DELAYED RELEASE ORAL at 06:20

## 2020-01-30 RX ADMIN — ENOXAPARIN SODIUM 40 MG: 40 INJECTION SUBCUTANEOUS at 08:46

## 2020-01-30 RX ADMIN — ACETAMINOPHEN 650 MG: 325 TABLET ORAL at 02:17

## 2020-01-30 RX ADMIN — SILVER SULFADIAZINE: 10 CREAM TOPICAL at 10:24

## 2020-01-30 RX ADMIN — HYDROMORPHONE HYDROCHLORIDE 2 MG: 2 TABLET ORAL at 13:32

## 2020-01-30 RX ADMIN — ACETAMINOPHEN 650 MG: 325 TABLET ORAL at 10:27

## 2020-01-30 RX ADMIN — Medication 10 ML: at 06:21

## 2020-01-30 RX ADMIN — WATER 2 G: 1 INJECTION INTRAMUSCULAR; INTRAVENOUS; SUBCUTANEOUS at 12:10

## 2020-01-30 RX ADMIN — SODIUM CHLORIDE 75 ML/HR: 900 INJECTION, SOLUTION INTRAVENOUS at 02:02

## 2020-01-30 RX ADMIN — DOCUSATE SODIUM 100 MG: 100 CAPSULE, LIQUID FILLED ORAL at 08:45

## 2020-01-30 NOTE — PROGRESS NOTES
Bedside shift change report given to Camila Olszewski (oncoming nurse) by [facility] AAYUSH (offgoing nurse). Report included the following information SBAR, Kardex, OR Summary, Procedure Summary, Intake/Output and MAR.

## 2020-01-30 NOTE — PROGRESS NOTES
11:06 AM.  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 Her spouse will pick her up for dc. No CM needs for dc. Care Management Interventions  PCP Verified by CM:  Yes  Mode of Transport at Discharge: (Family will transport)  Physical Therapy Consult: No  Occupational Therapy Consult: No  Current Support Network: Lives with Spouse  Discharge Location  Discharge Placement: Home with family assistance

## 2020-01-30 NOTE — PROGRESS NOTES
POD #1  Patient doing well. Pain controlled with PCA. Tolerating liquid diet. VSS. Afebrile  Breasts soft bilaterally. Incisions clean, dry, and intact.  Left nipple slightly congested  Drains Serosangiouness  1) D/C when ready  2) Silvadene to left nipple  3) OOB  4) D/C PCA  5) VERONICA drain care

## 2020-01-30 NOTE — PROGRESS NOTES
Problem: Falls - Risk of  Goal: *Absence of Falls  Description  Document Walt Arrieta Fall Risk and appropriate interventions in the flowsheet.   Outcome: Progressing Towards Goal  Note: Fall Risk Interventions:  Mobility Interventions: Communicate number of staff needed for ambulation/transfer, Patient to call before getting OOB         Medication Interventions: Patient to call before getting OOB, Teach patient to arise slowly    Elimination Interventions: Call light in reach

## 2020-02-04 NOTE — OP NOTES
[facility]  OPERATIVE REPORT    Name:  Linda Maldonado  MR#:  [id number]  :  1950  ACCOUNT #:  [de-identified]  DATE OF SERVICE:  2020      PROCEDURES PERFORMED:  1.  Bilateral breast reduction. 2.  Creation of inferior pedicle fasciocutaneous flap, right and left breasts. PREOPERATIVE DIAGNOSES:  Chronic back pain, shoulder pain, neck pain, inframammary intertrigo and bra strap grooving secondary to macromastia. POSTOPERATIVE DIAGNOSES:  Chronic back pain, shoulder pain, neck pain, inframammary intertrigo and bra strap grooving secondary to macromastia. ATTENDING SURGEON:  Srinath Mendoza MD    ASSISTANT:  David Hamm PA-C. Due to the complexity of this procedure, surgical assistance was required. MARTA Agosto assisted with the resection of redundant breast tissue, creation of superior and inferior pedicle fasciocutaneous flaps, irrigation, hemostasis, drain placement, closure of the wounds. ANESTHESIA:  General endotracheal.    ESTIMATED BLOOD LOSS:  50 mL. DRAINS:  19-Bolivian Edwin x2. IMPLANTS:  None. SPECIMENS REMOVED:  1. Total excised tissue, right breast, 1210 g.  2.  Total excised tissue, left breast, 960 g. COMPLICATIONS:  None. COUNTS:  Correct x2. DISPOSITION:  Stable to PACU.    BRIEF HISTORY:  The patient is a 31-year-old female with a long-term history of chronic back pain, shoulder pain, neck pain, inframammary intertrigo and bra strap grooving secondary to macromastia. She now presents for bilateral breast reduction. Bilateral breast reduction via superior medial pedicle nipple transposition with possible free nipple grafting is offered. The overall procedure, incisional approach, expected outcomes and recovery were discussed.   The risks, benefits and alternatives to the procedure including but not limited to bleeding, infection, damage to surrounding tissue structures, the need for revisional surgery, seroma, hematoma, skin flap loss, fat necrosis, partial or total loss of the nipple, partial or total loss of sensation to the nipple, hypertrophic scarring, asymmetry, PE, and DVT were discussed. She understood these risks and agreed to proceed. PROCEDURE NOTE:  The preoperative markings were made with the patient in the standing position. Informed consent was obtained. The patient was taken to the operating room and placed supine on the operating table. Sequential compression boots were placed. General endotracheal anesthesia was obtained. A lower body Sarah Hugger was placed. The chest was prepped and draped in the usual sterile fashion. The preoperative markings were injected with 60 mL of 0.25% lidocaine with 1:400,000 epinephrine. Bilateral circumareolar incisions were designed with the nipple on moderate stretch using a 38-mm cookie cutter. Bilateral superior medial pedicles were designed 7 cm in length, 5 cm wide. Bilateral inferior pedicles were designed along the meridian 6 cm wide, 7 cm long at the level of the inframammary fold. This was done to reduce dead space at the triple point closure. Both breasts were then infused with 500 mL of standard tumescent solution consisting of 1 liter of warm lactated Ringer's mixed with 1 ampule of epinephrine and 10 mL of 2% lidocaine plain using a Lamis infiltrating cannula. This was done to reduce intraoperative blood loss and to improve dissection using the plasma blade. The right circumareolar incision was made sharply. The vertical, horizontal and inframammary incisions were made. The superior medial pedicle and inferior pedicle were incised and de-epithelialized. The inferior pedicle was dissected along its medial, lateral and superior aspects directed onto the chest wall fascia capturing underlying pectoral perforators. The superior medial pedicle was then dissected along its medial, lateral and inferior aspects directed onto the chest wall fascia.   The vertical and horizontal incisions were deepened with the plasma blade to the chest wall fascia raising a superiorly-based fasciocutaneous breast flap. The medial and lateral aspects of the inframammary wound were then deepened to the chest wall fascia. The medial, central and lateral breast wedges were then removed en bloc with the plasma blade and passed off the operating table for pathology. The right side weighed 1210 g. Hemostasis was secured throughout with 2-0 Vicryl stick ties and electrocautery. The entire wound bed was then irrigated with 2 liters of triple antibiotic solution. A #19-Macedonian Gara Jennifer drain was brought through a lateral stab incision and secured to the skin with 3-0 nylon and placed within the wound bed. The superior fasciocutaneous breast flap was transposed inferiorly to its new anatomic position and tailor tacked closed with a 0 Prolene key stitch and surgical staples. A 2 x 2 cm triangular skin dermal flap was created along the inframammary fold at the level of the meridian. This was inset into the vertical incision in the V-Y fashion to reduce skin tension at the triple point closure. The inframammary wound was closed with running deep dermal 2-0 Monocryl and running subcuticular 3-0 Monocryl. The vertical incision was closed likewise. The nipple-areolar complex was brought through a new aperture 8 cm superior to the inframammary fold along the meridian. The aperture was marked with a 38-mm cookie cutter, incised and de-epithelialized. The nipple-areolar complex was then brought out through this new aperture in proper orientation without tension and inset with interrupted deep dermal 2-0 Monocryl and running subcuticular 3-0 Monocryl. The exact same procedure was repeated on the left side for a total resection of 960 g. Symmetry and volume were satisfactory. The wounds were then dressed with Dermabond, 4x4s, and Medipore tape. A surgical bra was placed.   The anesthesia was then discontinued. The patient was extubated in the operating room having tolerated the procedure well. The needle, instrument and laparotomy pad counts at the end of the case were correct.       Rogena Kayser, MD      [id number]  D:  02/03/2020 14:29  T:  02/04/2020 1:01  JOB #:  [id number]

## 2021-11-01 DIAGNOSIS — M25.562 LEFT KNEE PAIN, UNSPECIFIED CHRONICITY: Primary | ICD-10-CM

## 2021-11-03 ENCOUNTER — OFFICE VISIT (OUTPATIENT)
Dept: ORTHOPEDIC SURGERY | Age: 71
End: 2021-11-03
Payer: MEDICARE

## 2021-11-03 VITALS — WEIGHT: 184 LBS | HEIGHT: 64 IN | BODY MASS INDEX: 31.41 KG/M2

## 2021-11-03 DIAGNOSIS — G89.29 CHRONIC PAIN OF LEFT KNEE: ICD-10-CM

## 2021-11-03 DIAGNOSIS — M25.562 CHRONIC PAIN OF LEFT KNEE: ICD-10-CM

## 2021-11-03 DIAGNOSIS — M17.12 PRIMARY OSTEOARTHRITIS OF LEFT KNEE: ICD-10-CM

## 2021-11-03 DIAGNOSIS — M17.12 PRIMARY OSTEOARTHRITIS OF LEFT KNEE: Primary | ICD-10-CM

## 2021-11-03 PROBLEM — M76.821 POSTERIOR TIBIAL TENDON DYSFUNCTION, RIGHT: Status: ACTIVE | Noted: 2021-05-11

## 2021-11-03 PROCEDURE — G8536 NO DOC ELDER MAL SCRN: HCPCS | Performed by: ORTHOPAEDIC SURGERY

## 2021-11-03 PROCEDURE — 3017F COLORECTAL CA SCREEN DOC REV: CPT | Performed by: ORTHOPAEDIC SURGERY

## 2021-11-03 PROCEDURE — 99203 OFFICE O/P NEW LOW 30 MIN: CPT | Performed by: ORTHOPAEDIC SURGERY

## 2021-11-03 PROCEDURE — G8400 PT W/DXA NO RESULTS DOC: HCPCS | Performed by: ORTHOPAEDIC SURGERY

## 2021-11-03 PROCEDURE — G8427 DOCREV CUR MEDS BY ELIG CLIN: HCPCS | Performed by: ORTHOPAEDIC SURGERY

## 2021-11-03 PROCEDURE — 1090F PRES/ABSN URINE INCON ASSESS: CPT | Performed by: ORTHOPAEDIC SURGERY

## 2021-11-03 PROCEDURE — G8510 SCR DEP NEG, NO PLAN REQD: HCPCS | Performed by: ORTHOPAEDIC SURGERY

## 2021-11-03 PROCEDURE — G8419 CALC BMI OUT NRM PARAM NOF/U: HCPCS | Performed by: ORTHOPAEDIC SURGERY

## 2021-11-03 PROCEDURE — 1101F PT FALLS ASSESS-DOCD LE1/YR: CPT | Performed by: ORTHOPAEDIC SURGERY

## 2021-11-03 NOTE — PROGRESS NOTES
Name: Ana Manley    : 1950     Service Dept: [address] [place] [address] and Sports Medicine    Patient's Pharmacies:    [address] [place], [place] [address]  [place] [address]  Phone: [phone] Fax: [phone]       Chief Complaint   Patient presents with    Knee Pain        Visit Vitals  Ht 5' 4\" (1.626 m)   Wt 184 lb (83.5 kg)   BMI 31.58 kg/m²      Allergies   Allergen Reactions    Cimetidine Hives    Codeine Hives    Hydrocodone Hives    Midazolam Nausea and Vomiting    Oxycodone Hives    Famotidine Rash    Omeprazole Rash and Hives    Sucralose Rash      Current Outpatient Medications   Medication Sig Dispense Refill    amoxicillin-clavulanate (AUGMENTIN) 875-125 mg per tablet TK 1 T PO TWICE DAILY. START 1 DAY B SURGERY      calcium citrate-vitamin d3 (CITRACAL+D) 315-200 mg-unit tab Take 1 Tab by mouth daily (with breakfast).  cyclobenzaprine (FLEXERIL) 5 mg tablet Take 5 mg by mouth as needed for Muscle Spasm(s).  esomeprazole (NEXIUM) 20 mg capsule Take 20 mg by mouth daily.  methocarbamol (ROBAXIN) 500 mg tablet Take 500 mg by mouth as needed for Muscle Spasm(s).  rosuvastatin (CRESTOR) 10 mg tablet Take 10 mg by mouth nightly.  co-enzyme Q-10 (COQ-10) 100 mg capsule Take 200 mg by mouth daily.  cholecalciferol, vitamin D3, (VITAMIN D3) 50 mcg (2,000 unit) tab Take 1 Tab by mouth daily. Patient Active Problem List   Diagnosis Code    Macromastia N62    Posterior tibial tendon dysfunction, right M76.821      History reviewed. No pertinent family history. Social History     Socioeconomic History    Marital status:    Tobacco Use    Smoking status: Never Smoker    Smokeless tobacco: Never Used   Substance and Sexual Activity    Alcohol use:  Yes     Alcohol/week: 2.0 standard drinks     Types: 2 Glasses of wine per week    Drug use: Never      Past Surgical History:   Procedure Laterality Date    HX BREAST REDUCTION Bilateral 1/29/2020    BILATERAL BREAST REDUCTION performed by Jeremy Eason MD at [facility] HX CHOLECYSTECTOMY  2002    HX HEENT Bilateral     cataracts    HX ORTHOPAEDIC Left 2017    ORIF    HX MATTIE AND BSO  2004    [place] BREAST SURGERY PROCEDURE UNLISTED Right 2001    Biopsy - negative      Past Medical History:   Diagnosis Date    Arthritis     left knee    Cholesterosis     Chronic pain     lower and upper back pain    GERD (gastroesophageal reflux disease)     on medication, does well    Osteopenia 2019    spine    Osteoporosis     of left hip        I have reviewed and agree with [facility] and intake form in chart and the record furthermore I have reviewed prior medical record(s) regarding this patients care during this appointment. Review of Systems:   Patient is a pleasant appearing individual, appropriately dressed, well hydrated, well nourished, who is alert, appropriately oriented for age, and in no acute distress with a normal gait and normal affect who does not appear to be in any significant pain. Physical Exam:  Left Knee -Decrease range of motion with flexion, Knee arc of greater than 50 degrees, Some crepitation, Grossly neurovascularly intact, Good cap refill, No skin lesion, Moderate swelling, No gross instability, Some quadriceps weakness, Kellgren and Eliud at least grade 3    Right Knee - Full Range of Motion, No crepitation, Grossly neurovascularly intact, Good cap refill, No skin lesion, No swelling, No gross instability, No quadriceps weakness       Encounter Diagnoses     ICD-10-CM ICD-9-CM   1. Primary osteoarthritis of left knee  M17.12 715.16   2. Chronic pain of left knee  M25.562 719.46    G89.29 338.29       HPI:  The patient is here with a chief complaint of left knee pain, dull, throbbing pain, progressively getting worse. Pain is 2/10. X-rays are positive for severe OA of the left knee.   Failed conservative treatment. Assessment/Plan:  Plan will be for left knee replacement, general medical clearance, outpatient surgery. As part of continued conservative pain management options the patient was advised to utilize Tylenol or OTC NSAIDS as long as it is not medically contraindicated. Return to Office: Follow-up and Dispositions    · Return for [facility] FOR SURGERY. Scribed by Russ Cohn as dictated by Carola Funk. Johan Boykin MD.  Documentation True and Accepted Flaco Boykin MD

## 2021-11-03 NOTE — PATIENT INSTRUCTIONS
Knee Arthritis: Care Instructions  Your Care Instructions     Knee arthritis is a breakdown of the cartilage that cushions your knee joint. When the cartilage wears down, your bones rub against each other. This causes pain and stiffness. Knee arthritis tends to get worse with time. Treatment for knee arthritis involves reducing pain, making the leg muscles stronger, and staying at a healthy body weight. The treatment usually does not improve the health of the cartilage, but it can reduce pain and improve how well your knee works. You can take simple measures to protect your knee joints, ease your pain, and help you stay active. Follow-up care is a key part of your treatment and safety. Be sure to make and go to all appointments, and call your doctor if you are having problems. It's also a good idea to know your test results and keep a list of the medicines you take. How can you care for yourself at home? · Know that knee arthritis will cause more pain on some days than on others. · Stay at a healthy weight. Lose weight if you are overweight. When you stand up, the pressure on your knees from every pound of body weight is multiplied four times. So if you lose 10 pounds, you will reduce the pressure on your knees by 40 pounds. · Talk to your doctor or physical therapist about exercises that will help ease joint pain. ? Stretch to help prevent stiffness and to prevent injury before you exercise. You may enjoy gentle forms of yoga to help keep your knee joints and muscles flexible. ? Walk instead of jog.  ? Ride a bike. This makes your thigh muscles stronger and takes pressure off your knee. ? Wear well-fitting and comfortable shoes. ? Exercise in chest-deep water. This can help you exercise longer with less pain. ? Avoid exercises that include squatting or kneeling. They can put a lot of strain on your knees.   ? Talk to your doctor to make sure that the exercise you do is not making the arthritis worse.  · Do not sit for long periods of time. Try to walk once in a while to keep your knee from getting stiff. · Ask your doctor or physical therapist whether shoe inserts may reduce your arthritis pain. · If you can afford it, get new athletic shoes at least every year. This can help reduce the strain on your knees. · Use a device to help you do everyday activities. ? A cane or walking stick can help you keep your balance when you walk. Hold the cane or walking stick in the hand opposite the painful knee. ? If you feel like you may fall when you walk, try using crutches or a front-wheeled walker. These can prevent falls that could cause more damage to your knee. ? A knee brace may help keep your knee stable and prevent pain. ? You also can use other things to make life easier, such as a higher toilet seat and handrails in the bathtub or shower. · Take pain medicines exactly as directed. ? Do not wait until you are in severe pain. You will get better results if you take it sooner. ? If you are not taking a prescription pain medicine, take an over-the-counter medicine such as acetaminophen (Tylenol), ibuprofen (Advil, Motrin), or naproxen (Aleve). Read and follow all instructions on the label. ? Do not take two or more pain medicines at the same time unless the doctor told you to. Many pain medicines have acetaminophen, which is Tylenol. Too much acetaminophen (Tylenol) can be harmful. ? Tell your doctor if you take a blood thinner, have diabetes, or have allergies to shellfish. · Ask your doctor if you might benefit from a shot of steroid medicine into your knee. This may provide pain relief for several months. · Many people take the supplements glucosamine and chondroitin for osteoarthritis. Some people feel they help, but the medical research does not show that they work. Talk to your doctor before you take these supplements. When should you call for help?    Call your doctor now or seek immediate medical care if:    · You have sudden swelling, warmth, or pain in your knee.     · You have knee pain and a fever or rash.     · You have such bad pain that you cannot use your knee. Watch closely for changes in your health, and be sure to contact your doctor if you have any problems. Where can you learn more? Go to http://www.gray.com/  Enter W187 in the search box to learn more about \"Knee Arthritis: Care Instructions. \"  Current as of: April 30, 2021               Content Version: 13.0  © 3937-4765 [employer]. Care instructions adapted under license by [employer] (which disclaims liability or warranty for this information). If you have questions about a medical condition or this instruction, always ask your healthcare professional. [address] any warranty or liability for your use of this information.

## 2021-11-03 NOTE — LETTER
11/4/2021    Patient: Kameron Khan   YOB: 1950   Date of Visit: 11/3/2021     Chuy Irene, [address]  [facility] [address] [facility] [address]  Via Fax: [phone]    Dear Chuy Irene MD,      Thank you for referring Ms. Kameron Khan to [address] [place] [address] [facility] for evaluation. My notes for this consultation are attached. If you have questions, please do not hesitate to call me. I look forward to following your patient along with you.       Sincerely,    Juventino Salazar MD

## 2022-01-25 ENCOUNTER — HOSPITAL ENCOUNTER (OUTPATIENT)
Dept: NON INVASIVE DIAGNOSTICS | Age: 72
Discharge: HOME | End: 2022-01-25
Payer: MEDICARE

## 2022-01-25 ENCOUNTER — HOSPITAL ENCOUNTER (OUTPATIENT)
Dept: GENERAL RADIOLOGY | Age: 72
Discharge: HOME | End: 2022-01-25
Payer: MEDICARE

## 2022-01-25 ENCOUNTER — HOSPITAL ENCOUNTER (OUTPATIENT)
Dept: LAB | Age: 72
Discharge: HOME | End: 2022-01-25
Payer: MEDICARE

## 2022-01-25 DIAGNOSIS — M17.12 PRIMARY OSTEOARTHRITIS OF LEFT KNEE: ICD-10-CM

## 2022-01-25 DIAGNOSIS — M25.562 CHRONIC PAIN OF LEFT KNEE: ICD-10-CM

## 2022-01-25 DIAGNOSIS — G89.29 CHRONIC PAIN OF LEFT KNEE: ICD-10-CM

## 2022-01-25 LAB
ANION GAP SERPL CALC-SCNC: 6 MMOL/L (ref 5–15)
ATRIAL RATE: 66 BPM
BASOPHILS # BLD: 0.1 K/UL (ref 0–0.1)
BASOPHILS NFR BLD: 1 % (ref 0–1)
BUN SERPL-MCNC: 15 MG/DL (ref 6–20)
BUN/CREAT SERPL: 22 (ref 12–20)
CA-I BLD-MCNC: 9.4 MG/DL (ref 8.5–10.1)
CALCULATED P AXIS, ECG09: 66 DEGREES
CALCULATED R AXIS, ECG10: 41 DEGREES
CALCULATED T AXIS, ECG11: 41 DEGREES
CHLORIDE SERPL-SCNC: 107 MMOL/L (ref 97–108)
CO2 SERPL-SCNC: 26 MMOL/L (ref 21–32)
CREAT SERPL-MCNC: 0.68 MG/DL (ref 0.55–1.02)
DIAGNOSIS, 93000: NORMAL
DIFFERENTIAL METHOD BLD: ABNORMAL
EOSINOPHIL # BLD: 0.1 K/UL (ref 0–0.4)
EOSINOPHIL NFR BLD: 1 % (ref 0–7)
ERYTHROCYTE [DISTWIDTH] IN BLOOD BY AUTOMATED COUNT: 12.4 % (ref 11.5–14.5)
GLUCOSE SERPL-MCNC: 103 MG/DL (ref 65–100)
HCT VFR BLD AUTO: 43 % (ref 35–47)
HGB BLD-MCNC: 14.2 G/DL (ref 11.5–16)
IMM GRANULOCYTES # BLD AUTO: 0.1 K/UL (ref 0–0.04)
IMM GRANULOCYTES NFR BLD AUTO: 1 % (ref 0–0.5)
LYMPHOCYTES # BLD: 2.2 K/UL (ref 0.8–3.5)
LYMPHOCYTES NFR BLD: 28 % (ref 12–49)
MCH RBC QN AUTO: 30.3 PG (ref 26–34)
MCHC RBC AUTO-ENTMCNC: 33 G/DL (ref 30–36.5)
MCV RBC AUTO: 91.9 FL (ref 80–99)
MONOCYTES # BLD: 0.6 K/UL (ref 0–1)
MONOCYTES NFR BLD: 7 % (ref 5–13)
MRSA DNA SPEC QL NAA+PROBE: NOT DETECTED
NEUTS SEG # BLD: 4.9 K/UL (ref 1.8–8)
NEUTS SEG NFR BLD: 62 % (ref 32–75)
NRBC # BLD: 0 K/UL (ref 0–0.01)
NRBC BLD-RTO: 0 PER 100 WBC
P-R INTERVAL, ECG05: 152 MS
PLATELET # BLD AUTO: 242 K/UL (ref 150–400)
PMV BLD AUTO: 9.7 FL (ref 8.9–12.9)
POTASSIUM SERPL-SCNC: 4.2 MMOL/L (ref 3.5–5.1)
Q-T INTERVAL, ECG07: 384 MS
QRS DURATION, ECG06: 96 MS
QTC CALCULATION (BEZET), ECG08: 402 MS
RBC # BLD AUTO: 4.68 M/UL (ref 3.8–5.2)
SODIUM SERPL-SCNC: 139 MMOL/L (ref 136–145)
VENTRICULAR RATE, ECG03: 66 BPM
WBC # BLD AUTO: 7.8 K/UL (ref 3.6–11)

## 2022-01-25 PROCEDURE — 85025 COMPLETE CBC W/AUTO DIFF WBC: CPT

## 2022-01-25 PROCEDURE — 80048 BASIC METABOLIC PNL TOTAL CA: CPT

## 2022-01-25 PROCEDURE — 71046 X-RAY EXAM CHEST 2 VIEWS: CPT

## 2022-01-25 PROCEDURE — 93010 ELECTROCARDIOGRAM REPORT: CPT | Performed by: INTERNAL MEDICINE

## 2022-01-25 PROCEDURE — 93005 ELECTROCARDIOGRAM TRACING: CPT

## 2022-01-25 PROCEDURE — 36415 COLL VENOUS BLD VENIPUNCTURE: CPT

## 2022-01-25 PROCEDURE — 87641 MR-STAPH DNA AMP PROBE: CPT

## 2022-02-28 DIAGNOSIS — M25.562 CHRONIC PAIN OF LEFT KNEE: ICD-10-CM

## 2022-02-28 DIAGNOSIS — G89.29 CHRONIC PAIN OF LEFT KNEE: ICD-10-CM

## 2022-02-28 DIAGNOSIS — M17.12 PRIMARY OSTEOARTHRITIS OF LEFT KNEE: Primary | ICD-10-CM

## 2022-03-07 DIAGNOSIS — G89.29 CHRONIC PAIN OF LEFT KNEE: ICD-10-CM

## 2022-03-07 DIAGNOSIS — M17.12 PRIMARY OSTEOARTHRITIS OF LEFT KNEE: Primary | ICD-10-CM

## 2022-03-07 DIAGNOSIS — M25.562 CHRONIC PAIN OF LEFT KNEE: ICD-10-CM

## 2022-03-10 ENCOUNTER — OFFICE VISIT (OUTPATIENT)
Dept: ORTHOPEDIC SURGERY | Age: 72
End: 2022-03-10
Payer: MEDICARE

## 2022-03-10 ENCOUNTER — HOSPITAL ENCOUNTER (OUTPATIENT)
Dept: PREADMISSION TESTING | Age: 72
Discharge: HOME | End: 2022-03-10
Payer: MEDICARE

## 2022-03-10 ENCOUNTER — ANESTHESIA EVENT (OUTPATIENT)
Dept: SURGERY | Age: 72
End: 2022-03-10
Payer: MEDICARE

## 2022-03-10 DIAGNOSIS — M25.562 LEFT KNEE PAIN, UNSPECIFIED CHRONICITY: ICD-10-CM

## 2022-03-10 DIAGNOSIS — M17.12 PRIMARY OSTEOARTHRITIS OF LEFT KNEE: Primary | ICD-10-CM

## 2022-03-10 LAB — SARS-COV-2, COV2: NORMAL

## 2022-03-10 PROCEDURE — U0003 INFECTIOUS AGENT DETECTION BY NUCLEIC ACID (DNA OR RNA); SEVERE ACUTE RESPIRATORY SYNDROME CORONAVIRUS 2 (SARS-COV-2) (CORONAVIRUS DISEASE [COVID-19]), AMPLIFIED PROBE TECHNIQUE, MAKING USE OF HIGH THROUGHPUT TECHNOLOGIES AS DESCRIBED BY CMS-2020-01-R: HCPCS

## 2022-03-10 PROCEDURE — 99214 OFFICE O/P EST MOD 30 MIN: CPT | Performed by: ORTHOPAEDIC SURGERY

## 2022-03-10 PROCEDURE — 3017F COLORECTAL CA SCREEN DOC REV: CPT | Performed by: ORTHOPAEDIC SURGERY

## 2022-03-10 PROCEDURE — 1101F PT FALLS ASSESS-DOCD LE1/YR: CPT | Performed by: ORTHOPAEDIC SURGERY

## 2022-03-10 PROCEDURE — G8536 NO DOC ELDER MAL SCRN: HCPCS | Performed by: ORTHOPAEDIC SURGERY

## 2022-03-10 PROCEDURE — G8427 DOCREV CUR MEDS BY ELIG CLIN: HCPCS | Performed by: ORTHOPAEDIC SURGERY

## 2022-03-10 PROCEDURE — 1090F PRES/ABSN URINE INCON ASSESS: CPT | Performed by: ORTHOPAEDIC SURGERY

## 2022-03-10 PROCEDURE — G8400 PT W/DXA NO RESULTS DOC: HCPCS | Performed by: ORTHOPAEDIC SURGERY

## 2022-03-10 PROCEDURE — G8432 DEP SCR NOT DOC, RNG: HCPCS | Performed by: ORTHOPAEDIC SURGERY

## 2022-03-10 PROCEDURE — G8417 CALC BMI ABV UP PARAM F/U: HCPCS | Performed by: ORTHOPAEDIC SURGERY

## 2022-03-10 RX ORDER — INSULIN LISPRO 100 [IU]/ML
INJECTION, SOLUTION INTRAVENOUS; SUBCUTANEOUS ONCE
Status: CANCELLED | OUTPATIENT
Start: 2022-03-10 | End: 2022-03-10

## 2022-03-10 RX ORDER — CEPHALEXIN 500 MG/1
500 CAPSULE ORAL 4 TIMES DAILY
Qty: 28 CAPSULE | Refills: 0 | Status: SHIPPED | OUTPATIENT
Start: 2022-03-10 | End: 2022-03-24 | Stop reason: ALTCHOICE

## 2022-03-10 RX ORDER — MELOXICAM 15 MG/1
TABLET ORAL
COMMUNITY
Start: 2021-12-03

## 2022-03-10 RX ORDER — ONDANSETRON 4 MG/1
4 TABLET, ORALLY DISINTEGRATING ORAL
Qty: 20 TABLET | Refills: 0 | Status: SHIPPED | OUTPATIENT
Start: 2022-03-10 | End: 2022-03-14

## 2022-03-10 RX ORDER — CYCLOBENZAPRINE HCL 10 MG
10 TABLET ORAL
COMMUNITY
Start: 2021-12-03

## 2022-03-10 RX ORDER — HYDROMORPHONE HYDROCHLORIDE 4 MG/1
4 TABLET ORAL
Qty: 30 TABLET | Refills: 0 | Status: SHIPPED | OUTPATIENT
Start: 2022-03-10 | End: 2022-03-14

## 2022-03-10 NOTE — PATIENT INSTRUCTIONS
Knee Arthritis: Care Instructions  Your Care Instructions     Knee arthritis is a breakdown of the cartilage that cushions your knee joint. When the cartilage wears down, your bones rub against each other. This causes pain and stiffness. Knee arthritis tends to get worse with time. Treatment for knee arthritis involves reducing pain, making the leg muscles stronger, and staying at a healthy body weight. The treatment usually does not improve the health of the cartilage, but it can reduce pain and improve how well your knee works. You can take simple measures to protect your knee joints, ease your pain, and help you stay active. Follow-up care is a key part of your treatment and safety. Be sure to make and go to all appointments, and call your doctor if you are having problems. It's also a good idea to know your test results and keep a list of the medicines you take. How can you care for yourself at home? · Know that knee arthritis will cause more pain on some days than on others. · Stay at a healthy weight. Lose weight if you are overweight. When you stand up, the pressure on your knees from every pound of body weight is multiplied four times. So if you lose 10 pounds, you will reduce the pressure on your knees by 40 pounds. · Talk to your doctor or physical therapist about exercises that will help ease joint pain. ? Stretch to help prevent stiffness and to prevent injury before you exercise. You may enjoy gentle forms of yoga to help keep your knee joints and muscles flexible. ? Walk instead of jog.  ? Ride a bike. This makes your thigh muscles stronger and takes pressure off your knee. ? Wear well-fitting and comfortable shoes. ? Exercise in chest-deep water. This can help you exercise longer with less pain. ? Avoid exercises that include squatting or kneeling. They can put a lot of strain on your knees.   ? Talk to your doctor to make sure that the exercise you do is not making the arthritis worse.  · Do not sit for long periods of time. Try to walk once in a while to keep your knee from getting stiff. · Ask your doctor or physical therapist whether shoe inserts may reduce your arthritis pain. · If you can afford it, get new athletic shoes at least every year. This can help reduce the strain on your knees. · Use a device to help you do everyday activities. ? A cane or walking stick can help you keep your balance when you walk. Hold the cane or walking stick in the hand opposite the painful knee. ? If you feel like you may fall when you walk, try using crutches or a front-wheeled walker. These can prevent falls that could cause more damage to your knee. ? A knee brace may help keep your knee stable and prevent pain. ? You also can use other things to make life easier, such as a higher toilet seat and handrails in the bathtub or shower. · Take pain medicines exactly as directed. ? Do not wait until you are in severe pain. You will get better results if you take it sooner. ? If you are not taking a prescription pain medicine, take an over-the-counter medicine such as acetaminophen (Tylenol), ibuprofen (Advil, Motrin), or naproxen (Aleve). Read and follow all instructions on the label. ? Do not take two or more pain medicines at the same time unless the doctor told you to. Many pain medicines have acetaminophen, which is Tylenol. Too much acetaminophen (Tylenol) can be harmful. ? Tell your doctor if you take a blood thinner, have diabetes, or have allergies to shellfish. · Ask your doctor if you might benefit from a shot of steroid medicine into your knee. This may provide pain relief for several months. · Many people take the supplements glucosamine and chondroitin for osteoarthritis. Some people feel they help, but the medical research does not show that they work. Talk to your doctor before you take these supplements. When should you call for help?    Call your doctor now or seek immediate medical care if:    · You have sudden swelling, warmth, or pain in your knee.     · You have knee pain and a fever or rash.     · You have such bad pain that you cannot use your knee. Watch closely for changes in your health, and be sure to contact your doctor if you have any problems. Where can you learn more? Go to http://www.gray.com/  Enter W187 in the search box to learn more about \"Knee Arthritis: Care Instructions. \"  Current as of: December 20, 2021               Content Version: 13.2  © 6385-2773 [employer]. Care instructions adapted under license by [employer] (which disclaims liability or warranty for this information). If you have questions about a medical condition or this instruction, always ask your healthcare professional. [address] any warranty or liability for your use of this information.

## 2022-03-10 NOTE — LETTER
3/11/2022    Patient: Crystal Dexter   YOB: 1950   Date of Visit: 3/10/2022     Rupal Diana, [address]  [facility] [address] [facility] [address]  Via Fax: [phone]    Dear Rupal Diana MD,      Thank you for referring Ms. Crystal Dexter to [address] [place] [address] for evaluation. My notes for this consultation are attached. If you have questions, please do not hesitate to call me. I look forward to following your patient along with you.       Sincerely,    Kandi Mc MD

## 2022-03-10 NOTE — PROGRESS NOTES
Name: Julee Duggan    : 1950     Service Dept: [address] [place] [address] Sports Medicine    Chief Complaint   Patient presents with    Pre-op Exam    Knee Pain        There were no vitals taken for this visit. Allergies   Allergen Reactions    Cimetidine Hives    Codeine Hives    Hydrocodone Hives    Midazolam Nausea and Vomiting    Oxycodone Hives    Famotidine Rash    Omeprazole Rash and Hives    Sucralose Rash        Current Outpatient Medications   Medication Sig Dispense Refill    meloxicam (MOBIC) 15 mg tablet TAKE ONE TABLET BY MOUTH EVERY DAY as needed for pain      cyclobenzaprine (FLEXERIL) 10 mg tablet Take 10 mg by mouth nightly.  amoxicillin-clavulanate (AUGMENTIN) 875-125 mg per tablet TK 1 T PO TWICE DAILY. START 1 DAY B SURGERY      calcium citrate-vitamin d3 (CITRACAL+D) 315-200 mg-unit tab Take 1 Tab by mouth daily (with breakfast).  esomeprazole (NEXIUM) 20 mg capsule Take 20 mg by mouth daily.  methocarbamol (ROBAXIN) 500 mg tablet Take 500 mg by mouth as needed for Muscle Spasm(s).  rosuvastatin (CRESTOR) 10 mg tablet Take 10 mg by mouth nightly.  co-enzyme Q-10 (COQ-10) 100 mg capsule Take 200 mg by mouth daily.  cholecalciferol, vitamin D3, (VITAMIN D3) 50 mcg (2,000 unit) tab Take 1 Tab by mouth daily. Patient Active Problem List   Diagnosis Code    Macromastia N62    Posterior tibial tendon dysfunction, right M76.821      No family history on file. Social History     Socioeconomic History    Marital status:    Tobacco Use    Smoking status: Never Smoker    Smokeless tobacco: Never Used   Substance and Sexual Activity    Alcohol use:  Yes     Alcohol/week: 2.0 standard drinks     Types: 2 Glasses of wine per week    Drug use: Never      Past Surgical History:   Procedure Laterality Date    HX BREAST REDUCTION Bilateral 2020    BILATERAL BREAST REDUCTION performed by Shyanne Carroll MD at [facility]    HX CHOLECYSTECTOMY  2002    HX HEENT Bilateral     cataracts    HX ORTHOPAEDIC Left 2017    ORIF    HX MATTIE AND BSO  2004    [place] BREAST SURGERY PROCEDURE UNLISTED Right 2001    Biopsy - negative      Past Medical History:   Diagnosis Date    Arthritis     left knee    Cholesterosis     Chronic pain     lower and upper back pain    GERD (gastroesophageal reflux disease)     on medication, does well    Osteopenia 2019    spine    Osteoporosis     of left hip        I have reviewed and agree with [facility] and ROS and intake form in chart and the record furthermore I have reviewed prior medical record(s) regarding this patients care during this appointment. Review of Systems:   Patient is a pleasant appearing individual, appropriately dressed, well hydrated, well nourished, who is alert, appropriately oriented for age, and in no acute distress with a normal gait and normal affect who does not appear to be in any significant pain. Physical Exam:  Left Knee -Decrease range of motion with flexion, Knee arc of greater than 50 degrees, Some crepitation, Grossly neurovascularly intact, Good cap refill, No skin lesion, Moderate swelling, No gross instability, Some quadriceps weakness, Kellgren and Eliud at least grade 3    Right Knee - Full Range of Motion, No crepitation, Grossly neurovascularly intact, Good cap refill, No skin lesion, No swelling, No gross instability, No quadriceps weakness    Inpatient status: The patient has admitted to severe pain in the affected knee and due to such pain they are unable to complete activities of daily living at home and/or work on a regular basis where conservative treatments have failed. After extensive discussion with the patient, they have chosen to receive a total knee replacement with the expectation of inpatient procedure.  Their dependent functional status (i.e. lack of capable support and safety at home, pain management, comorbities, or difficulty ambulating with assistive walking devices) would deem them a candidate for an inpatient stay. The patient acknowledges and understand the plan. The risks of surgery were explained to the patient which include but not limited to infection, nerve injury, artery injury, tendon injury, poor result, poor wound healing, unforeseen incidence, bleeding, infection, nerve damage, failure to improve, worsening of symptoms, morbidity, and mortality risks were explained. All questions were answered. Patient was told of no guarantees. Patient accepts all risks and benefits. A consent for surgery will be documented and signed by the patient or a legal guardian. All questions were answered. The procedure was explained in detail. The patient was counseled about the risks of andrew Covid-19 during their perioperative period and any recovery window from their procedure. The patient was made aware that andrew Covid-19 may worsen their prognosis for recovering from their procedure and lend to a higher morbidity and/or mortality risk. All material risks, benefits, and reasonable alternatives including postponing the procedure were discussed. The patient DOES wish to proceed with their procedure at this time. Encounter Diagnoses     ICD-10-CM ICD-9-CM   1. Primary osteoarthritis of left knee  M17.12 715.16   2. Left knee pain, unspecified chronicity  M25.562 719.46       HPI:  The patient is here with a chief complaint of left knee pain. Diagnosed with osteoarthritis. Progressively getting worse. Scheduled for a preop appointment today. Pain is 1/10 unless it flares up. Assessment/Plan:  Plan will be for left total knee replacement. No history of blood clots. He does not take any blood thinners. No surgical complications and we are going to do Dilaudid, Keflex, Zofran, aspirin.   She is also going to get 7 days worth of antibiotics that she will start 1 day prior to her surgery when her permanent crown is placed just as a precaution and go from there. As part of continued conservative pain management options the patient was advised to utilize Tylenol or OTC NSAIDS as long as it is not medically contraindicated. Return to Office: Follow-up and Dispositions    · Return for already scheduled for surgery. Scribed by Frankey Lutes, LPN as dictated by [facility] [place] TAWNY Lock MD.  Documentation True and Accepted [facility] TAWNY Lock MD

## 2022-03-10 NOTE — H&P (VIEW-ONLY)
Name: Omer Juarez    : 1950     Service Dept: [address] [place] [address] Sports Medicine    Chief Complaint   Patient presents with    Pre-op Exam    Knee Pain        There were no vitals taken for this visit. Allergies   Allergen Reactions    Cimetidine Hives    Codeine Hives    Hydrocodone Hives    Midazolam Nausea and Vomiting    Oxycodone Hives    Famotidine Rash    Omeprazole Rash and Hives    Sucralose Rash        Current Outpatient Medications   Medication Sig Dispense Refill    meloxicam (MOBIC) 15 mg tablet TAKE ONE TABLET BY MOUTH EVERY DAY as needed for pain      cyclobenzaprine (FLEXERIL) 10 mg tablet Take 10 mg by mouth nightly.  amoxicillin-clavulanate (AUGMENTIN) 875-125 mg per tablet TK 1 T PO TWICE DAILY. START 1 DAY B SURGERY      calcium citrate-vitamin d3 (CITRACAL+D) 315-200 mg-unit tab Take 1 Tab by mouth daily (with breakfast).  esomeprazole (NEXIUM) 20 mg capsule Take 20 mg by mouth daily.  methocarbamol (ROBAXIN) 500 mg tablet Take 500 mg by mouth as needed for Muscle Spasm(s).  rosuvastatin (CRESTOR) 10 mg tablet Take 10 mg by mouth nightly.  co-enzyme Q-10 (COQ-10) 100 mg capsule Take 200 mg by mouth daily.  cholecalciferol, vitamin D3, (VITAMIN D3) 50 mcg (2,000 unit) tab Take 1 Tab by mouth daily. Patient Active Problem List   Diagnosis Code    Macromastia N62    Posterior tibial tendon dysfunction, right M76.821      No family history on file. Social History     Socioeconomic History    Marital status:    Tobacco Use    Smoking status: Never Smoker    Smokeless tobacco: Never Used   Substance and Sexual Activity    Alcohol use:  Yes     Alcohol/week: 2.0 standard drinks     Types: 2 Glasses of wine per week    Drug use: Never      Past Surgical History:   Procedure Laterality Date    HX BREAST REDUCTION Bilateral 2020    BILATERAL BREAST REDUCTION performed by Gui Fields MD at [facility]    HX CHOLECYSTECTOMY  2002    HX HEENT Bilateral     cataracts    HX ORTHOPAEDIC Left 2017    ORIF    HX MATTIE AND BSO  2004    [place] BREAST SURGERY PROCEDURE UNLISTED Right 2001    Biopsy - negative      Past Medical History:   Diagnosis Date    Arthritis     left knee    Cholesterosis     Chronic pain     lower and upper back pain    GERD (gastroesophageal reflux disease)     on medication, does well    Osteopenia 2019    spine    Osteoporosis     of left hip        I have reviewed and agree with [facility] and ROS and intake form in chart and the record furthermore I have reviewed prior medical record(s) regarding this patients care during this appointment. Review of Systems:   Patient is a pleasant appearing individual, appropriately dressed, well hydrated, well nourished, who is alert, appropriately oriented for age, and in no acute distress with a normal gait and normal affect who does not appear to be in any significant pain. Physical Exam:  Left Knee -Decrease range of motion with flexion, Knee arc of greater than 50 degrees, Some crepitation, Grossly neurovascularly intact, Good cap refill, No skin lesion, Moderate swelling, No gross instability, Some quadriceps weakness, Kellgren and Eliud at least grade 3    Right Knee - Full Range of Motion, No crepitation, Grossly neurovascularly intact, Good cap refill, No skin lesion, No swelling, No gross instability, No quadriceps weakness    Inpatient status: The patient has admitted to severe pain in the affected knee and due to such pain they are unable to complete activities of daily living at home and/or work on a regular basis where conservative treatments have failed. After extensive discussion with the patient, they have chosen to receive a total knee replacement with the expectation of inpatient procedure.  Their dependent functional status (i.e. lack of capable support and safety at home, pain management, comorbities, or difficulty ambulating with assistive walking devices) would deem them a candidate for an inpatient stay. The patient acknowledges and understand the plan. The risks of surgery were explained to the patient which include but not limited to infection, nerve injury, artery injury, tendon injury, poor result, poor wound healing, unforeseen incidence, bleeding, infection, nerve damage, failure to improve, worsening of symptoms, morbidity, and mortality risks were explained. All questions were answered. Patient was told of no guarantees. Patient accepts all risks and benefits. A consent for surgery will be documented and signed by the patient or a legal guardian. All questions were answered. The procedure was explained in detail. The patient was counseled about the risks of andrew Covid-19 during their perioperative period and any recovery window from their procedure. The patient was made aware that andrew Covid-19 may worsen their prognosis for recovering from their procedure and lend to a higher morbidity and/or mortality risk. All material risks, benefits, and reasonable alternatives including postponing the procedure were discussed. The patient DOES wish to proceed with their procedure at this time. Encounter Diagnoses     ICD-10-CM ICD-9-CM   1. Primary osteoarthritis of left knee  M17.12 715.16   2. Left knee pain, unspecified chronicity  M25.562 719.46       HPI:  The patient is here with a chief complaint of left knee pain. Diagnosed with osteoarthritis. Progressively getting worse. Scheduled for a preop appointment today. Pain is 1/10 unless it flares up. Assessment/Plan:  Plan will be for left total knee replacement. No history of blood clots. He does not take any blood thinners. No surgical complications and we are going to do Dilaudid, Keflex, Zofran, aspirin.   She is also going to get 7 days worth of antibiotics that she will start 1 day prior to her surgery when her permanent crown is placed just as a precaution and go from there. As part of continued conservative pain management options the patient was advised to utilize Tylenol or OTC NSAIDS as long as it is not medically contraindicated. Return to Office: Follow-up and Dispositions    · Return for already scheduled for surgery. Scribed by Connie Guaman LPN as dictated by [facility] [place] TAWNY Ontiveros MD.  Documentation True and Accepted [place] TAWNY Ontiveros MD

## 2022-03-11 LAB — SARS-COV-2, NAA: NOT DETECTED

## 2022-03-14 ENCOUNTER — TELEPHONE (OUTPATIENT)
Dept: ORTHOPEDIC SURGERY | Age: 72
End: 2022-03-14

## 2022-03-14 DIAGNOSIS — M17.12 PRIMARY OSTEOARTHRITIS OF LEFT KNEE: Primary | ICD-10-CM

## 2022-03-14 RX ORDER — ONDANSETRON 4 MG/1
4 TABLET, ORALLY DISINTEGRATING ORAL
Qty: 20 TABLET | Refills: 0 | Status: SHIPPED | OUTPATIENT
Start: 2022-03-14 | End: 2022-05-03

## 2022-03-14 RX ORDER — HYDROMORPHONE HYDROCHLORIDE 4 MG/1
4 TABLET ORAL
Qty: 30 TABLET | Refills: 0 | Status: SHIPPED | OUTPATIENT
Start: 2022-03-14 | End: 2022-03-24

## 2022-03-14 RX ORDER — CEPHALEXIN 500 MG/1
500 CAPSULE ORAL EVERY 8 HOURS
Qty: 9 CAPSULE | Refills: 0 | Status: ON HOLD | OUTPATIENT
Start: 2022-03-14 | End: 2022-03-17

## 2022-03-14 NOTE — TELEPHONE ENCOUNTER
PT is requesting a refill on dilaudid. Her other pharmacy no longer accepts her insurance. She will be using the [facility].

## 2022-03-17 ENCOUNTER — APPOINTMENT (OUTPATIENT)
Dept: GENERAL RADIOLOGY | Age: 72
End: 2022-03-17
Attending: NURSE PRACTITIONER
Payer: MEDICARE

## 2022-03-17 ENCOUNTER — ANESTHESIA (OUTPATIENT)
Dept: SURGERY | Age: 72
End: 2022-03-17
Payer: MEDICARE

## 2022-03-17 ENCOUNTER — HOSPITAL ENCOUNTER (OUTPATIENT)
Age: 72
Discharge: HOME | End: 2022-03-17
Attending: ORTHOPAEDIC SURGERY | Admitting: NURSE PRACTITIONER
Payer: MEDICARE

## 2022-03-17 VITALS
RESPIRATION RATE: 16 BRPM | TEMPERATURE: 97.5 F | OXYGEN SATURATION: 96 % | DIASTOLIC BLOOD PRESSURE: 83 MMHG | SYSTOLIC BLOOD PRESSURE: 147 MMHG | HEIGHT: 64 IN | HEART RATE: 95 BPM | BODY MASS INDEX: 30.7 KG/M2 | WEIGHT: 179.8 LBS

## 2022-03-17 PROBLEM — M17.9 OA (OSTEOARTHRITIS) OF KNEE: Status: ACTIVE | Noted: 2022-03-17

## 2022-03-17 PROCEDURE — 77030000032 HC CUF TRNQT ZIMM -B: Performed by: ORTHOPAEDIC SURGERY

## 2022-03-17 PROCEDURE — 74011000272 HC RX REV CODE- 272: Performed by: ORTHOPAEDIC SURGERY

## 2022-03-17 PROCEDURE — 74011250637 HC RX REV CODE- 250/637: Performed by: NURSE ANESTHETIST, CERTIFIED REGISTERED

## 2022-03-17 PROCEDURE — C1776 JOINT DEVICE (IMPLANTABLE): HCPCS | Performed by: ORTHOPAEDIC SURGERY

## 2022-03-17 PROCEDURE — 77030018673: Performed by: ORTHOPAEDIC SURGERY

## 2022-03-17 PROCEDURE — 77030040361 HC SLV COMPR DVT MDII -B: Performed by: ORTHOPAEDIC SURGERY

## 2022-03-17 PROCEDURE — 77030006835 HC BLD SAW SAG STRY -B: Performed by: ORTHOPAEDIC SURGERY

## 2022-03-17 PROCEDURE — 97161 PT EVAL LOW COMPLEX 20 MIN: CPT

## 2022-03-17 PROCEDURE — C1713 ANCHOR/SCREW BN/BN,TIS/BN: HCPCS | Performed by: ORTHOPAEDIC SURGERY

## 2022-03-17 PROCEDURE — 77030013079 HC BLNKT BAIR HGGR 3M -A: Performed by: NURSE ANESTHETIST, CERTIFIED REGISTERED

## 2022-03-17 PROCEDURE — 74011000250 HC RX REV CODE- 250: Performed by: ORTHOPAEDIC SURGERY

## 2022-03-17 PROCEDURE — 76210000024 HC REC RM PH II 2.5 TO 3 HR: Performed by: ORTHOPAEDIC SURGERY

## 2022-03-17 PROCEDURE — 76210000063 HC OR PH I REC FIRST 0.5 HR: Performed by: ORTHOPAEDIC SURGERY

## 2022-03-17 PROCEDURE — 76942 ECHO GUIDE FOR BIOPSY: CPT | Performed by: NURSE ANESTHETIST, CERTIFIED REGISTERED

## 2022-03-17 PROCEDURE — 77030011628: Performed by: ORTHOPAEDIC SURGERY

## 2022-03-17 PROCEDURE — 64450 NJX AA&/STRD OTHER PN/BRANCH: CPT | Performed by: NURSE ANESTHETIST, CERTIFIED REGISTERED

## 2022-03-17 PROCEDURE — 77030040393 HC DRSG OPTIFOAM GENT MDII -B: Performed by: ORTHOPAEDIC SURGERY

## 2022-03-17 PROCEDURE — 77030003601 HC NDL NRV BLK BBMI -A: Performed by: NURSE ANESTHETIST, CERTIFIED REGISTERED

## 2022-03-17 PROCEDURE — 77030031140 HC SUT VCRL3 J&J -A: Performed by: ORTHOPAEDIC SURGERY

## 2022-03-17 PROCEDURE — 77030013708 HC HNDPC SUC IRR PULS STRY –B: Performed by: ORTHOPAEDIC SURGERY

## 2022-03-17 PROCEDURE — 77030039147 HC PWDR HEMSTS SURGICEL JNJ -D: Performed by: ORTHOPAEDIC SURGERY

## 2022-03-17 PROCEDURE — 74011250636 HC RX REV CODE- 250/636: Performed by: NURSE ANESTHETIST, CERTIFIED REGISTERED

## 2022-03-17 PROCEDURE — 77030038692 HC WND DEB SYS IRMX -B: Performed by: ORTHOPAEDIC SURGERY

## 2022-03-17 PROCEDURE — 77030031139 HC SUT VCRL2 J&J -A: Performed by: ORTHOPAEDIC SURGERY

## 2022-03-17 PROCEDURE — 73560 X-RAY EXAM OF KNEE 1 OR 2: CPT

## 2022-03-17 PROCEDURE — 77030011266 HC ELECTRD BLD INSL COVD -A: Performed by: ORTHOPAEDIC SURGERY

## 2022-03-17 PROCEDURE — 97116 GAIT TRAINING THERAPY: CPT

## 2022-03-17 PROCEDURE — 76010000131 HC OR TIME 2 TO 2.5 HR: Performed by: ORTHOPAEDIC SURGERY

## 2022-03-17 PROCEDURE — 2709999900 HC NON-CHARGEABLE SUPPLY: Performed by: ORTHOPAEDIC SURGERY

## 2022-03-17 PROCEDURE — 77030029372 HC ADH SKN CLSR PRINEO J&J -C: Performed by: ORTHOPAEDIC SURGERY

## 2022-03-17 PROCEDURE — 74011000258 HC RX REV CODE- 258: Performed by: NURSE ANESTHETIST, CERTIFIED REGISTERED

## 2022-03-17 PROCEDURE — 74011250636 HC RX REV CODE- 250/636: Performed by: NURSE PRACTITIONER

## 2022-03-17 PROCEDURE — 64447 NJX AA&/STRD FEMORAL NRV IMG: CPT | Performed by: NURSE ANESTHETIST, CERTIFIED REGISTERED

## 2022-03-17 PROCEDURE — 76060000035 HC ANESTHESIA 2 TO 2.5 HR: Performed by: ORTHOPAEDIC SURGERY

## 2022-03-17 PROCEDURE — 74011000250 HC RX REV CODE- 250: Performed by: NURSE ANESTHETIST, CERTIFIED REGISTERED

## 2022-03-17 PROCEDURE — 77030007866 HC KT SPN ANES BBMI -B: Performed by: NURSE ANESTHETIST, CERTIFIED REGISTERED

## 2022-03-17 PROCEDURE — 74011250637 HC RX REV CODE- 250/637: Performed by: NURSE PRACTITIONER

## 2022-03-17 PROCEDURE — 77030006812 HC BLD SAW RECIP STRY -B: Performed by: ORTHOPAEDIC SURGERY

## 2022-03-17 DEVICE — KNEE K1 TOT HEMI STD CEM IMPL CAPPED SYNTHES: Type: IMPLANTABLE DEVICE | Site: KNEE | Status: FUNCTIONAL

## 2022-03-17 DEVICE — COMPONENT FEM SZ 6 L KNEE NAR POST STBL CEM ATTUNE: Type: IMPLANTABLE DEVICE | Site: KNEE | Status: FUNCTIONAL

## 2022-03-17 DEVICE — CEMENT BNE GENTAMICIN 40 GM HI VISC SINGLE DOSE CMW 1: Type: IMPLANTABLE DEVICE | Site: KNEE | Status: FUNCTIONAL

## 2022-03-17 DEVICE — COMPONENT PAT DIA35MM KNEE POLY DOME CEM MEDIALIZED ATTUNE: Type: IMPLANTABLE DEVICE | Site: KNEE | Status: FUNCTIONAL

## 2022-03-17 DEVICE — INSERT TIB SZ 6 THK5MM KNEE POST STBL ROT PLATFRM ATTUNE: Type: IMPLANTABLE DEVICE | Site: KNEE | Status: FUNCTIONAL

## 2022-03-17 DEVICE — BASEPLATE TIB SZ 5 KNEE ROT PLATFRM CEM SYS ATTUNE: Type: IMPLANTABLE DEVICE | Site: KNEE | Status: FUNCTIONAL

## 2022-03-17 RX ORDER — ACETAMINOPHEN 500 MG
1000 TABLET ORAL ONCE
Status: COMPLETED | OUTPATIENT
Start: 2022-03-17 | End: 2022-03-17

## 2022-03-17 RX ORDER — NALOXONE HYDROCHLORIDE 0.4 MG/ML
0.1 INJECTION, SOLUTION INTRAMUSCULAR; INTRAVENOUS; SUBCUTANEOUS
Status: DISCONTINUED | OUTPATIENT
Start: 2022-03-17 | End: 2022-03-17 | Stop reason: HOSPADM

## 2022-03-17 RX ORDER — SCOLOPAMINE TRANSDERMAL SYSTEM 1 MG/1
PATCH, EXTENDED RELEASE TRANSDERMAL AS NEEDED
Status: DISCONTINUED | OUTPATIENT
Start: 2022-03-17 | End: 2022-03-17 | Stop reason: HOSPADM

## 2022-03-17 RX ORDER — KETOROLAC TROMETHAMINE 30 MG/ML
15 INJECTION, SOLUTION INTRAMUSCULAR; INTRAVENOUS
Status: CANCELLED | OUTPATIENT
Start: 2022-03-17 | End: 2022-03-18

## 2022-03-17 RX ORDER — CEFAZOLIN SODIUM IN 0.9 % NACL 2 G/100 ML
2 PLASTIC BAG, INJECTION (ML) INTRAVENOUS ONCE
Status: COMPLETED | OUTPATIENT
Start: 2022-03-17 | End: 2022-03-17

## 2022-03-17 RX ORDER — SODIUM CHLORIDE 0.9 % (FLUSH) 0.9 %
5-40 SYRINGE (ML) INJECTION AS NEEDED
Status: CANCELLED | OUTPATIENT
Start: 2022-03-17

## 2022-03-17 RX ORDER — ONDANSETRON 2 MG/ML
INJECTION INTRAMUSCULAR; INTRAVENOUS AS NEEDED
Status: DISCONTINUED | OUTPATIENT
Start: 2022-03-17 | End: 2022-03-17 | Stop reason: HOSPADM

## 2022-03-17 RX ORDER — ONDANSETRON 2 MG/ML
4 INJECTION INTRAMUSCULAR; INTRAVENOUS
Status: CANCELLED | OUTPATIENT
Start: 2022-03-17

## 2022-03-17 RX ORDER — PROPOFOL 10 MG/ML
INJECTION, EMULSION INTRAVENOUS AS NEEDED
Status: DISCONTINUED | OUTPATIENT
Start: 2022-03-17 | End: 2022-03-17 | Stop reason: HOSPADM

## 2022-03-17 RX ORDER — EPHEDRINE SULFATE/0.9% NACL/PF 50 MG/5 ML
SYRINGE (ML) INTRAVENOUS AS NEEDED
Status: DISCONTINUED | OUTPATIENT
Start: 2022-03-17 | End: 2022-03-17 | Stop reason: HOSPADM

## 2022-03-17 RX ORDER — ONDANSETRON 2 MG/ML
4 INJECTION INTRAMUSCULAR; INTRAVENOUS ONCE
Status: DISCONTINUED | OUTPATIENT
Start: 2022-03-17 | End: 2022-03-17 | Stop reason: HOSPADM

## 2022-03-17 RX ORDER — SODIUM CHLORIDE 0.9 % (FLUSH) 0.9 %
5-40 SYRINGE (ML) INJECTION EVERY 8 HOURS
Status: CANCELLED | OUTPATIENT
Start: 2022-03-17

## 2022-03-17 RX ORDER — GABAPENTIN 300 MG/1
300 CAPSULE ORAL ONCE
Status: COMPLETED | OUTPATIENT
Start: 2022-03-17 | End: 2022-03-17

## 2022-03-17 RX ORDER — DEXAMETHASONE SODIUM PHOSPHATE 4 MG/ML
INJECTION, SOLUTION INTRA-ARTICULAR; INTRALESIONAL; INTRAMUSCULAR; INTRAVENOUS; SOFT TISSUE AS NEEDED
Status: DISCONTINUED | OUTPATIENT
Start: 2022-03-17 | End: 2022-03-17 | Stop reason: HOSPADM

## 2022-03-17 RX ORDER — CELECOXIB 200 MG/1
400 CAPSULE ORAL
Status: COMPLETED | OUTPATIENT
Start: 2022-03-17 | End: 2022-03-17

## 2022-03-17 RX ORDER — FLUMAZENIL 0.1 MG/ML
0.2 INJECTION INTRAVENOUS
Status: DISCONTINUED | OUTPATIENT
Start: 2022-03-17 | End: 2022-03-17 | Stop reason: HOSPADM

## 2022-03-17 RX ORDER — BUPIVACAINE HYDROCHLORIDE 5 MG/ML
INJECTION, SOLUTION EPIDURAL; INTRACAUDAL
Status: SHIPPED | OUTPATIENT
Start: 2022-03-17 | End: 2022-03-17

## 2022-03-17 RX ORDER — FENTANYL CITRATE 50 UG/ML
50 INJECTION, SOLUTION INTRAMUSCULAR; INTRAVENOUS AS NEEDED
Status: DISCONTINUED | OUTPATIENT
Start: 2022-03-17 | End: 2022-03-17 | Stop reason: HOSPADM

## 2022-03-17 RX ORDER — ASPIRIN 325 MG
325 TABLET, DELAYED RELEASE (ENTERIC COATED) ORAL 2 TIMES DAILY
Status: CANCELLED | OUTPATIENT
Start: 2022-03-18

## 2022-03-17 RX ORDER — NALOXONE HYDROCHLORIDE 0.4 MG/ML
0.4 INJECTION, SOLUTION INTRAMUSCULAR; INTRAVENOUS; SUBCUTANEOUS AS NEEDED
Status: CANCELLED | OUTPATIENT
Start: 2022-03-17

## 2022-03-17 RX ORDER — SODIUM CHLORIDE 0.9 % (FLUSH) 0.9 %
5-40 SYRINGE (ML) INJECTION AS NEEDED
Status: DISCONTINUED | OUTPATIENT
Start: 2022-03-17 | End: 2022-03-17 | Stop reason: HOSPADM

## 2022-03-17 RX ORDER — ACETAMINOPHEN 325 MG/1
650 TABLET ORAL
Status: CANCELLED | OUTPATIENT
Start: 2022-03-17

## 2022-03-17 RX ORDER — BUPIVACAINE HYDROCHLORIDE 2.5 MG/ML
INJECTION, SOLUTION EPIDURAL; INFILTRATION; INTRACAUDAL AS NEEDED
Status: DISCONTINUED | OUTPATIENT
Start: 2022-03-17 | End: 2022-03-17 | Stop reason: HOSPADM

## 2022-03-17 RX ORDER — SENNOSIDES 8.6 MG/1
1 TABLET ORAL 2 TIMES DAILY
Status: CANCELLED | OUTPATIENT
Start: 2022-03-17

## 2022-03-17 RX ORDER — CEFAZOLIN SODIUM IN 0.9 % NACL 2 G/100 ML
2 PLASTIC BAG, INJECTION (ML) INTRAVENOUS EVERY 8 HOURS
Status: CANCELLED | OUTPATIENT
Start: 2022-03-17 | End: 2022-03-18

## 2022-03-17 RX ORDER — HYDROMORPHONE HYDROCHLORIDE 2 MG/1
2 TABLET ORAL
Status: DISCONTINUED | OUTPATIENT
Start: 2022-03-17 | End: 2022-03-17 | Stop reason: HOSPADM

## 2022-03-17 RX ORDER — SODIUM CHLORIDE, SODIUM LACTATE, POTASSIUM CHLORIDE, CALCIUM CHLORIDE 600; 310; 30; 20 MG/100ML; MG/100ML; MG/100ML; MG/100ML
25 INJECTION, SOLUTION INTRAVENOUS CONTINUOUS
Status: DISCONTINUED | OUTPATIENT
Start: 2022-03-17 | End: 2022-03-17 | Stop reason: HOSPADM

## 2022-03-17 RX ORDER — SODIUM CHLORIDE, SODIUM LACTATE, POTASSIUM CHLORIDE, CALCIUM CHLORIDE 600; 310; 30; 20 MG/100ML; MG/100ML; MG/100ML; MG/100ML
25 INJECTION, SOLUTION INTRAVENOUS CONTINUOUS
Status: DISPENSED | OUTPATIENT
Start: 2022-03-17 | End: 2022-03-17

## 2022-03-17 RX ORDER — PROPOFOL 10 MG/ML
INJECTION, EMULSION INTRAVENOUS
Status: DISCONTINUED | OUTPATIENT
Start: 2022-03-17 | End: 2022-03-17 | Stop reason: HOSPADM

## 2022-03-17 RX ORDER — DIPHENHYDRAMINE HYDROCHLORIDE 50 MG/ML
12.5 INJECTION, SOLUTION INTRAMUSCULAR; INTRAVENOUS
Status: DISCONTINUED | OUTPATIENT
Start: 2022-03-17 | End: 2022-03-17 | Stop reason: HOSPADM

## 2022-03-17 RX ORDER — FACIAL-BODY WIPES
10 EACH TOPICAL DAILY PRN
Status: CANCELLED | OUTPATIENT
Start: 2022-03-17

## 2022-03-17 RX ORDER — DIPHENHYDRAMINE HYDROCHLORIDE 50 MG/ML
12.5 INJECTION, SOLUTION INTRAMUSCULAR; INTRAVENOUS
Status: CANCELLED | OUTPATIENT
Start: 2022-03-17

## 2022-03-17 RX ORDER — SODIUM CHLORIDE 0.9 % (FLUSH) 0.9 %
5-40 SYRINGE (ML) INJECTION EVERY 8 HOURS
Status: DISCONTINUED | OUTPATIENT
Start: 2022-03-17 | End: 2022-03-17 | Stop reason: HOSPADM

## 2022-03-17 RX ORDER — HYDROMORPHONE HYDROCHLORIDE 2 MG/1
4 TABLET ORAL
Status: DISCONTINUED | OUTPATIENT
Start: 2022-03-17 | End: 2022-03-17 | Stop reason: HOSPADM

## 2022-03-17 RX ADMIN — HYDROMORPHONE HYDROCHLORIDE 2 MG: 2 TABLET ORAL at 16:32

## 2022-03-17 RX ADMIN — TRANEXAMIC ACID 1 G: 1 INJECTION, SOLUTION INTRAVENOUS at 12:12

## 2022-03-17 RX ADMIN — Medication 10 MG: at 12:27

## 2022-03-17 RX ADMIN — PROPOFOL 20 MG: 10 INJECTION, EMULSION INTRAVENOUS at 11:27

## 2022-03-17 RX ADMIN — SODIUM CHLORIDE, POTASSIUM CHLORIDE, SODIUM LACTATE AND CALCIUM CHLORIDE: 600; 310; 30; 20 INJECTION, SOLUTION INTRAVENOUS at 13:10

## 2022-03-17 RX ADMIN — PROPOFOL 30 MG: 10 INJECTION, EMULSION INTRAVENOUS at 11:33

## 2022-03-17 RX ADMIN — SCOPOLAMINE 1 PATCH: 1.5 PATCH, EXTENDED RELEASE TRANSDERMAL at 08:20

## 2022-03-17 RX ADMIN — ONDANSETRON HYDROCHLORIDE 4 MG: 2 INJECTION, SOLUTION INTRAMUSCULAR; INTRAVENOUS at 13:03

## 2022-03-17 RX ADMIN — Medication 2 G: at 11:39

## 2022-03-17 RX ADMIN — ACETAMINOPHEN 1000 MG: 500 TABLET ORAL at 08:07

## 2022-03-17 RX ADMIN — SODIUM CHLORIDE, POTASSIUM CHLORIDE, SODIUM LACTATE AND CALCIUM CHLORIDE 25 ML/HR: 600; 310; 30; 20 INJECTION, SOLUTION INTRAVENOUS at 10:11

## 2022-03-17 RX ADMIN — PHENYLEPHRINE HYDROCHLORIDE 200 MCG: 10 INJECTION INTRAVENOUS at 12:34

## 2022-03-17 RX ADMIN — PROPOFOL 20 MG: 10 INJECTION, EMULSION INTRAVENOUS at 11:56

## 2022-03-17 RX ADMIN — GABAPENTIN 300 MG: 300 CAPSULE ORAL at 08:07

## 2022-03-17 RX ADMIN — BUPIVACAINE HYDROCHLORIDE 10 MG: 5 INJECTION, SOLUTION EPIDURAL; INTRACAUDAL; PERINEURAL at 11:32

## 2022-03-17 RX ADMIN — CELECOXIB 400 MG: 200 CAPSULE ORAL at 08:07

## 2022-03-17 RX ADMIN — DEXAMETHASONE SODIUM PHOSPHATE 10 MG: 4 INJECTION, SOLUTION INTRAMUSCULAR; INTRAVENOUS at 11:34

## 2022-03-17 RX ADMIN — PROPOFOL 75 MCG/KG/MIN: 10 INJECTION, EMULSION INTRAVENOUS at 11:39

## 2022-03-17 RX ADMIN — TRANEXAMIC ACID 1 G: 1 INJECTION, SOLUTION INTRAVENOUS at 11:39

## 2022-03-17 NOTE — DISCHARGE INSTRUCTIONS
TOTAL KNEE REPLACEMENT DISCHARGE INFORMATION    You have undergone a Total Knee Replacement. The following list is to provide you with some expectations over the next week upon your discharge from the hospital.     1. Please begin Aspirin 325mg every 12 hours (twice daily) starting tomorrow as directed until Dr. Jayleen Rhoades instructs you to discontinue it. If you are not sure which blood thinner to take please contact Dr. Tamera Chau office next business day for clarification. 2. Please be sure to continue your thigh-high compression stockings on both sides until instructed to discontinue them. 3. Over the course of the next week, you should continue thigh high stockings on the operative leg, DO NOT GET THE INCISION WET until instructed to do so. Please make sure the stockings on the operative leg are pulled up all the way to the thigh to prevent any creases which may result in abrasions or creases in the skin. If the stockings are creating creases resulting in abrasions or blistering on the operative leg please remove the stockings. You may take the stockings off on the nonoperative leg once you arrive home. 4. You may notice some bruising on your thigh and it may extend all the way to the ankles. That is perfectly normal early on.  5. You may experience a clicking noise in your knee and that is normal because of the artificial knee. 6. It is important to remember if you have any surgical procedure including dental procedures which may result in bleeding that an antibiotic 1 hour before the procedure will be required. Please let the provider performing the procedure know that you have artificial joint. If an antibiotic is not given by them please call our office and give us at least 5 business days to get you the appropriate antibiotics if needed. This rule applies indefinitely. 7. If an Ace wrap is placed on your knee you may remove the Ace wrap only 48 hours after your surgery.   We will leave the stockings on.  8. During the course of your  over the next week, should you experience fevers of 101.5 F, a white drainage from the incision, extreme redness around the incision, or the incision begins to have a pungent smell; Please call our office or page Dr. Tiburcio Washington whose numbers are provided in your discharge paperwork. To Page Dr. Tiburcio Washington please call [phone] and dial 0. Have the  page whomever is on call for Orthopedics. These are signs of infection and it should be addressed immediately. 9. Please do not drive until instructed to do so. 10. If you need a refill on pain medication please allow at least 2 business days notice for any refills. Immediate refill request may not be possible. Medication refill requests will not be addressed during non-business hours. Please do not page the on-call provider for pain medication refills after hours. 11. It is very important for you to begin your Outpatient Physical Therapy within a couple days of the day of your discharge and your appointment should have been set up. If your physical therapy has not been set up please call our office the next business day for assistance. Details provided in a separate sheet. 12. Remove ace wrap in 48 hrs after surgery but keep stockings on. 15. Finish all antibiotics, start the antibiotics as soon as you go home if you have prescribed antibiotics. 14.  You should perform your daily home exercises at least 4 times a day 30 minutes each time. Perform foot pumps on both feet at least 10 times every 15 minutes while awake. This helps prevent swelling in the leg and can help prevent blood clots in the leg. On the operative leg if you have significant swelling you can also lay down flat and put 3 pillows under the heel so the heel is above the heart level and then perform foot pumps 4 times a day for 10 minutes to help bring the swelling down. 15.  Do not place anything under your knee while sleeping at night.   Elevate your heel so your  is straight while sleeping at night. 16.  Perform deep breathing exercises 10 times every hour while awake. 17.  If you had a nerve block and you are not having pain the day of the surgery, at nighttime it is okay to take 1 pain medication before going to sleep to help prevent excruciating pain when the nerve block wears off. 18.  You may be given an ice pack machine use that to help prevent swelling. Do not apply heat to the incision area. 19.  While you are awake at least 10 times every 30 minutes move your foot up and down as if you are pumping gas from both feet to help prevent swelling and to promote blood circulation in the calf. 20.  If you develop sudden onset of shortness of breath or severe calf pain please go to closest emergency room. 21. Your pain medicine is a Narcotic and may cause constipation. You may take an over the counter stool softener while taking pain medicine. 22.  You may get text messages regarding questionnaires. Please assist Dr. Neil Paul and filling out those questionnaires. ICE THERAPY WRAP:    · Keep ice therapy wrap on when resting. · DO not wear when moving or walking. · Ice packs are reusable. · Ice Therapy wrap holds two ice packs at a time. Things to watch for:             Increased swelling of the surgical site             Spreading of redness around the incision site             Drainage of pus from the incision site             Developing a fever of 101.5 °F or higher             If any of these symptoms occur you have any questions please contact our office at [phone]. If you need to talk to Dr. Neil Paul on an urgent basis please call the hospital at [phone]. 0 for the . Please let the  know you are a surgical patient of Dr. Neil Paul and you wish to get in contact with him. If Dr. Neil Paul or his staff do not call you back within 30 minutes. Please tell the  to try again.     Phone: [phone]  [email or web address]. TalkTo

## 2022-03-17 NOTE — ANESTHESIA PREPROCEDURE EVALUATION
Relevant Problems   No relevant active problems       Anesthetic History     PONV          Review of Systems / Medical History  Patient summary reviewed, nursing notes reviewed and pertinent labs reviewed    Pulmonary  Within defined limits                 Neuro/Psych   Within defined limits           Cardiovascular              Hyperlipidemia    Exercise tolerance: >4 METS     GI/Hepatic/Renal     GERD           Endo/Other        Obesity and arthritis     Other Findings            Physical Exam    Airway  Mallampati: II  TM Distance: 4 - 6 cm  Neck ROM: normal range of motion   Mouth opening: Normal     Cardiovascular  Regular rate and rhythm,  S1 and S2 normal,  no murmur, click, rub, or gallop  Rhythm: regular  Rate: normal         Dental  No notable dental hx       Pulmonary  Breath sounds clear to auscultation               Abdominal  GI exam deferred       Other Findings            Anesthetic Plan    ASA: 2  Anesthesia type: spinal, general - backup and regional - saphenous block      Post-op pain plan if not by surgeon: peripheral nerve block single    Induction: Intravenous  Anesthetic plan and risks discussed with: Patient

## 2022-03-17 NOTE — PROGRESS NOTES
Problem: Mobility Impaired (Adult and Pediatric)  Goal: *Acute Goals and Plan of Care (Insert Text)  Description: Pt requires CGA  with gait and navigates stairs with CGA . PLOF: Community ambulator, no AD, (I) ADLs. Outcome: Resolved/Met     Problem: Patient Education: Go to Patient Education Activity  Goal: Patient/Family Education  Outcome: Resolved/Met   PHYSICAL THERAPY EVALUATION AND DISCHARGE    Patient: Julee Duggan (80 y.o. female)  Date: 3/17/2022  Primary Diagnosis: Osteoarthritis of left knee, unspecified osteoarthritis type [M17.12]  OA (osteoarthritis) of knee [M17.10]  Procedure(s) (LRB):  LEFT TKA (Left) Day of Surgery   Precautions:  WBAT    ASSESSMENT :  Based on the objective data described below, the patient presents s/p L TKA and she is WBAT. She is able to ambulate with a RW with CGA and she is able to navigate stairs with CGA. She is educated on a HEP and tolerates well. She can return home with outpatient P.T. Patient does not require further skilled intervention at this level of care. PLAN :  Recommendations and Planned Interventions:   No formal PT needs identified at this time. Discharge Recommendations: Outpatient  Further Equipment Recommendations for Discharge: N/A     SUBJECTIVE:   Patient states my L foot is still a little numb.     OBJECTIVE DATA SUMMARY:     Past Medical History:   Diagnosis Date    Arthritis     left knee    Cholesterosis     Chronic pain     lower and upper back pain    GERD (gastroesophageal reflux disease)     on medication, does well    Osteopenia 2019    spine    Osteoporosis     of left hip     Past Surgical History:   Procedure Laterality Date    HX BREAST REDUCTION Bilateral 1/29/2020    BILATERAL BREAST REDUCTION performed by Shyanne Carroll MD at [facility]    HX CHOLECYSTECTOMY  2002    HX HEENT Bilateral     cataracts    HX ORTHOPAEDIC Left 2017    ORIF    HX MATTIE AND BSO  2004    [place] BREAST SURGERY PROCEDURE UNLISTED Right 2001 Biopsy - negative     Barriers to Learning/Limitations: None  Compensate with: N/A  Home Situation:   Home Situation  Home Environment: Private residence  # Steps to Enter: 0  One/Two Story Residence: Other (Comment) (tri-level)  # of Interior Steps: 7 (2 sets of 7)  Living Alone: No  Support Systems: Spouse/Significant Other  Patient Expects to be Discharged to[de-identified] Home with outpatient services  Current DME Used/Available at Home: [address], rolling,Cane, straight  Critical Behavior:  Neurologic State: Alert  Orientation Level: Oriented X4  Skin Integrity: Incision (comment) (left knee)  Skin Integumentary  Skin Integrity: Incision (comment) (left knee)     Strength:    Strength: Generally decreased, functional (L knee 4/5, SLR 1530, 4 hours after spinal)  Tone & Sensation:   Tone: Normal  Sensation: Impaired (L foot tingling pelvic area numb)  Range Of Motion:   AROM: Generally decreased, functional (L knee 14-78)     PROM: Generally decreased, functional (L knee 12-87)  Posture:  Posture (WDL): Within defined limits    Functional Mobility:  Bed Mobility:  Rolling: Modified independent  Supine to Sit: Modified independent  Sit to Supine: Modified independent  Scooting: Modified independent  Transfers:  Sit to Stand: Contact guard assistance  Stand to Sit: Contact guard assistance  Balance:   Sitting: Intact  Standing: Impaired  Standing - Static: Good  Standing - Dynamic : Fair  Ambulation/Gait Training:  Distance (ft): 230 Feet (ft) (plus another 30')  Assistive Device: Walker, rolling  Ambulation - Level of Assistance: Contact guard assistance     Gait Description (WDL): Exceptions to WDL  Gait Abnormalities: Other (L knee buckling at times)  Right Side Weight Bearing: As tolerated  Stairs:  Number of Stairs Trained: 5 (nonreciprocating)  Stairs - Level of Assistance: Contact guard assistance  Rail Use: Both     Today's TX:   Pt is able to ambulate with CGA  with a RW. She is able to navigate stairs with CGA.  She is educated on a HEP and states understanding. Pain:  Pain level pre-treatment: 0/10   Pain level post-treatment: 1.5/10  Pain Location: L knee  Pain Intervention(s): Medication (see MAR); Rest, Ice, Repositioning   Response to intervention: Nurse notified, See doc flow    Activity Tolerance:   Good  Please refer to the flowsheet for vital signs taken during this treatment. After treatment:   []         Patient left in no apparent distress sitting up in chair  [x]         Patient left in no apparent distress in bed  []         Call bell left within reach  [x]         Nursing notified  []         Caregiver present  []         Bed alarm activated  []         SCDs applied    COMMUNICATION/EDUCATION:   [x]         Role of Physical Therapy in the acute care setting. [x]         Fall prevention education was provided and the patient/caregiver indicated understanding. [x]         Patient/family have participated as able in goal setting and plan of care. [x]         Patient/family agree to work toward stated goals and plan of care. []         Patient understands intent and goals of therapy, but is neutral about his/her participation. []         Patient is unable to participate in goal setting/plan of care: ongoing with therapy staff.  []         Other:     Thank you for this referral.  Cristian Gordon, PT, DPT   Time Calculation: 27 mins

## 2022-03-17 NOTE — INTERVAL H&P NOTE
Update History & Physical    The Patient's History and Physical was reviewed with the patient. The patient was examined. There was no change. The surgical site was confirmed by the patient and me. Patient understands and wants to proceed with the procedure. If applicable, I have discussed with the patient / power of  the rationale for blood component transfusion; its benefits in treating or preventing fatigue, organ damage, or death; and its risk which includes mild transfusion reactions, rare risk of blood borne infection, or more serious but rare reactions. I have discussed the alternatives to transfusion, including the risk and consequences of not receiving transfusion. The patient / Denae Gagedle of  had an opportunity to ask questions and had agreed to proceed with transfusion of blood components. Plan:  The risk, benefits, expected outcome, and alternative to the recommended procedure have been discussed with the patient.       Electronically signed by SELIN Mortensen on 3/17/2022 at 8:24 AM

## 2022-03-17 NOTE — ANESTHESIA PROCEDURE NOTES
Peripheral Block    Start time: 3/17/2022 8:12 AM  End time: 3/17/2022 8:19 AM  Performed by: Queta Sebastian CRNA  Authorized by: Queta Sebastian CRNA       Pre-procedure: Indications: at surgeon's request and post-op pain management    Preanesthetic Checklist: patient identified, risks and benefits discussed, site marked, timeout performed, anesthesia consent given and patient being monitored    Timeout Time: 08:12 EDT          Block Type:   Block Type: Adductor canal block  Laterality:  Left  Monitoring:  Responsive to questions, standard ASA monitoring, continuous pulse ox, oxygen, frequent vital sign checks and heart rate  Injection Technique:  Single shot  Procedures: ultrasound guided    Patient Position: supine  Prep: chlorhexidine    Location:  Mid thigh  Needle Type:  Ultraplex  Needle Gauge:  21 G  Needle Localization:  Ultrasound guidance  Med Admin Time: 3/17/2022 8:19 AM    Assessment:  Number of attempts:  1  Injection Assessment:  Incremental injection every 5 mL, local visualized surrounding nerve on ultrasound, negative aspiration for blood, no paresthesia, no intravascular symptoms and ultrasound image on chart  Patient tolerance:  Patient tolerated the procedure well with no immediate complications  Patient has PONV possibly associated witrh Midazalam.  50 mg propofol for block.   Pt is wide awake at 0827

## 2022-03-17 NOTE — OP NOTES
Operative Note    Patient:  Wang Berkowitz MRN: [id number]  Surgery Date: 3/17/2022  [unfilled]          Procedure  Primary Surgeon    LEFT TKA  Tristen Salinas MD    * Panel 2 does not exist *  * Panel 2 does not exist *    * Panel 3 does not exist *  * Panel 3 does not exist *     Surgeon(s) and Role:     * Tristen Salinas MD - Primary    Other OR Staff/Assistants:  Circ-1: Brandy Ennis RN  Circ-Relief: Natalie Esquivel  Registered Nurse First Assistant: Yogi Murdockub Tech-1: Freddie Holly  Scrub Tech-2: Susan Freeman    1st Assistant Tasks:  Closing    Pre-operative Diagnosis:  Osteoarthritis of left knee, unspecified osteoarthritis type [M17.12]    Post-operative Diagnosis: same as preop diagnosis    Anesthesia Type: Spinal     Findings: djd    Complications: No    EBL: 50 cc    Specimens: None    Implants     Implant    Cement Bne Gentamicin 40 Gm Hi Visc Single Dose Cmw 1 - [id number] - Implanted   (Left) Knee    Inventory item: CEMENT BNE GENTAMICIN 40 GM HI VISC SINGLE DOSE CMW 1 Model/Cat number: [id number]    : [employer] Lot number: [id number]    As of 3/17/2022     Status: Implanted                  Insert Tib Sz 6 Thk5mm Knee Post Stbl Rot Platfrm Attune - [id number] - Implanted   (Left) Knee    Inventory item: INSERT TIB SZ 6 THK5MM KNEE POST STBL ROT PLATFRM ATTUNE Model/Cat number: [id number]    : [employer] Lot number: [id number]    As of 3/17/2022     Status: Implanted                  Baseplate Tib Sz 5 Knee Rot Platfrm Alex Sys Attune - [id number] - Implanted   (Left) Knee    Inventory item: BASEPLATE TIB SZ 5 KNEE ROT PLATFRM ALEX SYS ATTUNE Model/Cat number: [id number]    : [employer] Lot number: [id number]    As of 3/17/2022     Status: Implanted                  Component Fem Sz 6 L Knee Alonso Post Stbl Alex Attune - [id number] - Implanted   (Left) Knee    Inventory item: COMPONENT FEM SZ 6 L KNEE ALONSO POST STBL ALEX ATTUNE Model/Cat number: [id number]    : [employer] Lot number: [id number]    As of 3/17/2022     Status: Implanted                  Pat Alex Dome Medial 35mm -- Attune - [id number] - Implanted   (Left) Knee    Inventory item: PAT ALEX DOME MEDIAL 35MM -- ATTUNE Model/Cat number: [id number]    : [employer] Lot number: [id number]    As of 3/17/2022     Status: Implanted                         OPERATIVE PROCEDURE:  Please note the first assistant role was to help in patient positioning and draping of the extremity in a sterile fashion. Also during the surgery the assistant's responsibilities included but not limited to extremity positioning during critical portions of the surgery. Assisting in using and placement of retractors during surgery. Lower extremity was prepped and draped in a sterile fashion. After adequate anesthesia was given, the patient was placed in a well-padded supine position. Subvastus arthrotomy from the tibial tubercle to the superior pole of the patella was made. Knee was hyperflexed. Intramedullary reaming of distal femur and proximal tibia was performed. 10 mm of distal femur was cut. Anterior-posterior sizing guide was used. Anterior, posterior, chamfer cuts, and box cuts were made next. Proximal tibial cut and preparation performed. Posterior osteophyte meniscal remnants were removed, and also patella was everted. Free-hand cut of the patella was made. Trial components were placed. The patient was found to have excellent range of motion and stability with all trial components. All the trial components removed. Copious irrigation performed. Distal femur, proximal tibia, and patella were impacted in place. Excessive cement was removed. After the cement was hard, Subvastus arthrotomy closed with Vicryl and Prineo stitch. Compressive dressing was applied.   The patient was taken to PACU in stable condition. Please note due to the patient's BMI of greater than 30 significant surgical effort was required compared to the standard patient with a BMI lower than 30. Surgical time increased approximately 30% from the normal surgical time due to the patient's high BMI. Because of the high BMI patient's knee would be considered a complex total knee replacement rather than a standard total knee replacement.       Marshall Tidwell MD

## 2022-03-17 NOTE — ANESTHESIA POSTPROCEDURE EVALUATION
Procedure(s):  LEFT TKA. spinal, regional, MAC    Anesthesia Post Evaluation      Multimodal analgesia: multimodal analgesia used between 6 hours prior to anesthesia start to PACU discharge  Patient location during evaluation: PACU  Patient participation: complete - patient participated  Level of consciousness: awake and alert  Pain management: adequate  Airway patency: patent  Anesthetic complications: no  Cardiovascular status: acceptable  Respiratory status: acceptable  Hydration status: acceptable  Comments: Ok to discharge when standard criteria are met.    Post anesthesia nausea and vomiting:  none  Final Post Anesthesia Temperature Assessment:  Normothermia (36.0-37.5 degrees C)      INITIAL Post-op Vital signs:   Vitals Value Taken Time   /91 03/17/22 1339   Temp 36.6 °C (97.8 °F) 03/17/22 1339   Pulse 86 03/17/22 1339   Resp 12 03/17/22 1339   SpO2 95 % 03/17/22 1339

## 2022-03-17 NOTE — ANESTHESIA PROCEDURE NOTES
Spinal Block    Start time: 3/17/2022 11:25 AM  End time: 3/17/2022 11:32 AM  Performed by: [employer] CRNA  Authorized by: [employer] CRNA     Pre-procedure:   Indications: primary anesthetic  Preanesthetic Checklist: patient identified, risks and benefits discussed, anesthesia consent, site marked, patient being monitored and timeout performed    Timeout Time: 11:25 EDT          Spinal Block:   Patient Position:  Seated  Prep Region:  Lumbar      Location:  L3-4  Technique:  Single shot        Needle:   Needle Type:  Pencan  Needle Gauge:  22 G  Attempts:  1      Events: CSF confirmed, no blood with aspiration and no paresthesia        Assessment:  Insertion:  Uncomplicated  Patient tolerance:  Patient tolerated the procedure well with no immediate complications

## 2022-03-18 ENCOUNTER — HOSPITAL ENCOUNTER (OUTPATIENT)
Dept: PHYSICAL THERAPY | Age: 72
Discharge: HOME | End: 2022-03-18
Payer: MEDICARE

## 2022-03-18 ENCOUNTER — TELEPHONE (OUTPATIENT)
Dept: ORTHOPEDIC SURGERY | Age: 72
End: 2022-03-18

## 2022-03-18 PROCEDURE — 97164 PT RE-EVAL EST PLAN CARE: CPT | Performed by: PHYSICAL THERAPIST

## 2022-03-18 PROCEDURE — 97110 THERAPEUTIC EXERCISES: CPT | Performed by: PHYSICAL THERAPIST

## 2022-03-18 NOTE — PROGRESS NOTES
W . [address] [place] [address], [address] Isabella Mclean  Phone: [phone]   Fax: [phone]    PT INITIAL EVALUATION NOTE - MCR 2-15  Plan of Care/Statement of Necessity for Physical Therapy Services  2-15    Patient Name: Toña Bhagat  Date:3/18/2022  : 1950  [x]  Patient  Verified  Provider#: [id number]  Payor: [place] MEDICARE / Plan: [place] MEDICARE PART A & B / Product Type: Medicare /    Referral source: Soraya Wiley MD   In time: 1:50 pm  Out time:300 pm   Total Treatment Time (min): 70 minutes  Total Timed Codes (min): 23 minutes   1:1 Treatment Time ([address] only): 23 minutes. Visit #: 1      Start of Care: 3/18/22      Onset Date: 3/17/22     Treatment Area/Diagnosis: Unilateral primary osteoarthritis, left knee [M17.12]  Pain in left knee [M25.562]  Other chronic pain [G89.29]    SUBJECTIVE  Pain Level (0-10 scale): 4                Best: 0           Worst:  5  Description: dull , achy . Any medication changes, allergies to medications, adverse drug reactions, diagnosis change, or new procedure performed?: [] No    [x] Yes (see summary sheet for update)    Subjective:    Chief complaint of pain left knee pain s/p TKA 3/17/2022. Taking medications as prescribed. Doing HEP:  Heel slides, ankle pumps, supine knee drop, LAQ, SAQ    PLOF: normal with a antalgic gait. Mechanism of Injury: n/a   Previous Treatment/Compliance: shots, gel injections. 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 Living Situation: lives with   Pt Goals: walk normally without pain (without an assistive device)   Barriers: none  Motivation: good  Substance use: alchohol   Cognition: A & O x 3       OBJECTIVE/EXAMINATION      Modality rationale: Decrease bleeding. See details of note.       Min Type Additional Details    [] Estim: []Att   []Unatt        []TENS instruct                  []IFC  []Premod   []NMES                     []Other:  []w/US   []w/ice   []w/heat  Position:  Location:    []  Traction: [] Cervical       []Lumbar                       [] Prone          []Supine                       []Intermittent   []Continuous Lbs:  [] before manual  [] after manual  []w/heat    []  Ultrasound: []Continuous   [] Pulsed at:                           []1MHz   []3MHz Location:  W/cm2:    [] Paraffin         Location:   []w/heat   10 [x]  Ice     []  Heat  []  Ice massage Position: reclined, leg elevated  Location: over left knee    []  Laser  []  Other: Position:  Location:      []  Vasopneumatic Device Pressure:       [] lo [] med [] hi   Temperature:      [x] Skin assessment post-treatment:  [x]intact []redness- no adverse reaction    []redness - adverse reaction:     23 min Therapeutic Exercise:  [] See flow sheet : reviewed HEP , made adjustments. Added 3 way SLR in // bars. Rationale: increase ROM to improve the patients ability to move without limitations. With   [x] TE   [] TA   [] neuro   [] other: Patient Education: [x] Review HEP   Updated program .  See above. [] Progressed/Changed HEP based on:   [] positioning   [] body mechanics   [] transfers   [] heat/ice application    [] other:    Ortho:   Observation:  WD, WN female. To PT via walker . Bandages intact. Min amount of dried blood visible on banadge. Functional Mobility:        Gait:  + walker, decrease left stance. Ambulated x 125 feet without significant fatigue      Stairs: training steps without reciprocal pattern       Balance:  Not tested  Palpation: Not tested     Girth:  Not measured due to bandaging. LE AROM:        Right knee:  Full extension to 125 flexion        Left knee:  -10 to 90.      Mobility Assessment:          Sit to stand with definite use of UEs        On off treatment table without assist. Neurological: Reflexes / Sensations:  N/a     Strength:  Not tested due to post op precautions. TUG: WNL     Pain Level (0-10 scale) post treatment: 5     ASSESSMENT/Key Information/Changes in Function:   79 yo female s/p left TKA 3/17/2022. Objective findings include ROM restrictions and gait abnormality. Will benefit from skilled PT intervention to facilitate a return to PT baseline. Note:  At the end of session, patient had breakthrough bleeding . Removed ace bandage and applied 3 ABDs with a new ace wrap. Patient called Dr. [facility] office and spoke to nurse. She advised patient to wait until tomorrow to assess situation. If bleeding continues tomorrow, f/u with MD.       Problem List: decrease ROM, decrease strength, impaired gait/ balance, decrease ADL/ functional abilitiies and decrease flexibility/ joint mobility    Short Term Goals: To be accomplished in 4 weeks:   1. Indp with HEP               2. PROM:  0 to 115  Long Term Goals: To be accomplished in 4 weeks:   1. Gait normal on even surfaces. 2.  Able to negotiate uneven surfaces              3.  Unilateral stance, > 10 seconds. Patient Goal (s): walk normally without an assistive device.       Evaluation Complexity History LOW Complexity : Zero comorbidities / personal factors that will impact the outcome / POC; Examination LOW Complexity : 1-2 Standardized tests and measures addressing body structure, function, activity limitation and / or participation in recreation  ;Presentation LOW Complexity : Stable, uncomplicated  ;Clinical Decision Making MEDIUM AM-PAC:  42.23 %  Overall Complexity Rating: LOW      Patient / Family readiness to learn indicated by: asking questions and trying to perform skills  Persons(s) to be included in education: patient (P)  Barriers to Learning/Limitations: None  Patient Self Reported Health Status: good  Rehabilitation Potential: good  Patient/ Caregiver education and instruction: exercises      PLAN:  Treatment Plan may include any combination of the following: Therapeutic exercise, Therapeutic activities, Neuromuscular re-education, Gait/balance training, Functional mobility training and Stair training  HEP Issued: Modified HEP issued by Dr. Shayy Salinas     Frequency / Duration: Patient to be seen 3 times per week for 6 weeks. [x]  Plan of care has been reviewed with PTA    Certification Period: 3/18/22  to  6/18/22 April Roc-Page, PT 3/18/2022     ________________________________________________________________________    I certify that the above Therapy Services are being furnished while the patient is under my care. I agree with the treatment plan and certify that this therapy is necessary.     Physician's Signature:____________________  Date:____________Time: _________            Mike Castro MD

## 2022-03-19 PROBLEM — M76.821 POSTERIOR TIBIAL TENDON DYSFUNCTION, RIGHT: Status: ACTIVE | Noted: 2021-05-11

## 2022-03-20 PROBLEM — N62 MACROMASTIA: Status: ACTIVE | Noted: 2020-01-29

## 2022-03-21 ENCOUNTER — HOSPITAL ENCOUNTER (OUTPATIENT)
Dept: PHYSICAL THERAPY | Age: 72
Discharge: HOME | End: 2022-03-21
Payer: MEDICARE

## 2022-03-21 PROCEDURE — 97110 THERAPEUTIC EXERCISES: CPT

## 2022-03-21 PROCEDURE — 97140 MANUAL THERAPY 1/> REGIONS: CPT

## 2022-03-21 NOTE — PROGRESS NOTES
PT DAILY TREATMENT NOTE - [facility] 2-15    Patient Name: Leia Grover  Date:3/21/2022  : 1950  [x]  Patient  Verified  Payor: Mayur Mena / Plan: [place] MEDICARE PART A & B / Product Type: Medicare /    In time:  1225 pm  Out time:  110 pm  Total Treatment Time (min): 45  Total Timed Codes (min): 44  1:1 Treatment Time ( only): 40   Visit #: 2          1    Treatment Area: Unilateral primary osteoarthritis, left knee [M17.12]  Pain in left knee [M25.562]  Other chronic pain [G89.29]    SUBJECTIVE  Pain Level (0-10 scale): 0  Any medication changes, allergies to medications, adverse drug reactions, diagnosis change, or new procedure performed?: [x] No    [] Yes (see summary sheet for update)  Subjective functional status/changes:   [] No changes reported  No new active bleeding R knee. Reports that she still has stiffness and swelling. Pain level 3/10 before taking meds this morning before PT. Doing HEP as instructed. OBJECTIVE    34 min Therapeutic Exercise:  [x] See flow sheet : Includes time spent observing response to exercise and discussing care. Rationale: increase ROM and increase strength to improve the patients ability to walk without limitations. 10 min Manual Therapy: Retrograde Massage and PROM R knee    Rationale: decrease pain, increase ROM, increase tissue extensibility and decrease edema  to improve the patients ability to walk without limitations          With   [x] TE   [] TA   [] neuro   [] other: Patient Education: [x] Review HEP    [] Progressed/Changed HEP based on:   [] positioning   [] body mechanics   [] transfers   [] heat/ice application    [] other:      Other Objective/Functional Measures: bruising present R anterior knee   PROM R knee extension:   -10 deg. Flexion:  60 deg. Pain Level (0-10 scale) post treatment: 0    ASSESSMENT/Changes in Function:   Patient did well with exercises and has no new active bleeding.   Limited ROM R knee due to tight wound bandage, swelling and stiffness. Walks without assistive device with mild deviation. Patient will continue to benefit from skilled PT services to modify and progress therapeutic interventions, address functional mobility deficits, address ROM deficits, address strength deficits and analyze and cue movement patterns to attain remaining goals. [x]  See Plan of Care  []  See progress note/recertification  []  See Discharge Summary         Progress towards goals / Updated goals:  Short Term Goals: To be accomplished in 4 weeks:              1. Indp with HEP               2. PROM:  0 to 115  Long Term Goals: To be accomplished in 4 weeks:              1.  Gait normal on even surfaces. 2.  Able to negotiate uneven surfaces              3.  Unilateral stance, > 10 seconds.      PLAN  [x]  Upgrade activities as tolerated     [x]  Continue plan of care  []  Update interventions per flow sheet       []  Discharge due to:_  []  Other:_      Navin Saucedo, PT 3/21/2022

## 2022-03-22 ENCOUNTER — TELEPHONE (OUTPATIENT)
Dept: ORTHOPEDIC SURGERY | Age: 72
End: 2022-03-22

## 2022-03-23 ENCOUNTER — HOSPITAL ENCOUNTER (OUTPATIENT)
Dept: PHYSICAL THERAPY | Age: 72
Discharge: HOME | End: 2022-03-23
Payer: MEDICARE

## 2022-03-23 PROCEDURE — 97110 THERAPEUTIC EXERCISES: CPT

## 2022-03-23 PROCEDURE — 97140 MANUAL THERAPY 1/> REGIONS: CPT

## 2022-03-23 NOTE — PROGRESS NOTES
PT DAILY TREATMENT NOTE - [facility] 2-15    Patient Name: Juana Machado  Date:3/23/2022  : 1950  [x]  Patient  Verified  Payor: Niraj Turner / Plan: [place] MEDICARE PART A & B / Product Type: Medicare /    In time:  1487 pm  Out time:  100 pm  Total Treatment Time (min): 45  Total Timed Codes (min): 39  1:1 Treatment Time ( [address] only): 44  Visit #: 3             Treatment Area: Unilateral primary osteoarthritis, left knee [M17.12]  Pain in left knee [M25.562]  Other chronic pain [G89.29]    SUBJECTIVE  Pain Level (0-10 scale): 0  Any medication changes, allergies to medications, adverse drug reactions, diagnosis change, or new procedure performed?: [x] No    [] Yes (see summary sheet for update)  Subjective functional status/changes:   [] No changes reported  No new active bleeding R knee. Changed the surgical bandage this morning. Reports that she still has stiffness and swelling. Pain level 3/10 before taking meds this morning before PT. Doing HEP as instructed. OBJECTIVE    29 min Therapeutic Exercise:  [x] See flow sheet : Includes time spent observing response to exercise and discussing care. Rationale: increase ROM and increase strength to improve the patients ability to walk without limitations. 10 min Manual Therapy: Retrograde Massage and PROM R knee    Rationale: decrease pain, increase ROM, increase tissue extensibility and decrease edema  to improve the patients ability to walk without limitations          With   [x] TE   [] TA   [] neuro   [] other: Patient Education: [x] Review HEP    [] Progressed/Changed HEP based on:   [] positioning   [] body mechanics   [] transfers   [] heat/ice application    [] other:      Other Objective/Functional Measures: bruising present R anterior knee   PROM R knee extension:   -10 deg. Flexion:  72 deg. Pain Level (0-10 scale) post treatment: 0    ASSESSMENT/Changes in Function:   Patient did well with exercises and has no new active bleeding.   Limited ROM R knee due to swelling and stiffness. Walks without assistive device with mild deviation. Patient will continue to benefit from skilled PT services to modify and progress therapeutic interventions, address functional mobility deficits, address ROM deficits, address strength deficits and analyze and cue movement patterns to attain remaining goals. [x]  See Plan of Care  []  See progress note/recertification  []  See Discharge Summary         Progress towards goals / Updated goals:  Short Term Goals: To be accomplished in 4 weeks:              1. Indp with HEP               2. PROM:  0 to 115  Long Term Goals: To be accomplished in 4 weeks:              1.  Gait normal on even surfaces. 2.  Able to negotiate uneven surfaces              3.  Unilateral stance, > 10 seconds.      PLAN  [x]  Upgrade activities as tolerated     [x]  Continue plan of care  []  Update interventions per flow sheet       []  Discharge due to:_  []  Other:_      Francine Guevara, PT 3/23/2022

## 2022-03-24 ENCOUNTER — OFFICE VISIT (OUTPATIENT)
Dept: ORTHOPEDIC SURGERY | Age: 72
End: 2022-03-24
Payer: MEDICARE

## 2022-03-24 DIAGNOSIS — Z96.652 STATUS POST LEFT KNEE REPLACEMENT: Primary | ICD-10-CM

## 2022-03-24 PROBLEM — M17.9 OA (OSTEOARTHRITIS) OF KNEE: Status: ACTIVE | Noted: 2022-03-17

## 2022-03-24 PROCEDURE — 99024 POSTOP FOLLOW-UP VISIT: CPT | Performed by: NURSE PRACTITIONER

## 2022-03-24 RX ORDER — CEPHALEXIN 500 MG/1
500 CAPSULE ORAL 4 TIMES DAILY
Qty: 28 CAPSULE | Refills: 0 | Status: SHIPPED | OUTPATIENT
Start: 2022-03-24 | End: 2022-05-03

## 2022-03-24 RX ORDER — TRAMADOL HYDROCHLORIDE 50 MG/1
50 TABLET ORAL
Qty: 30 TABLET | Refills: 0 | Status: SHIPPED | OUTPATIENT
Start: 2022-03-24 | End: 2022-04-07

## 2022-03-24 NOTE — PROGRESS NOTES
Subjective:      Patient presents for postop care following left TKA. Surgery was on 3/17/2022. Ambulating with a cane. Pain is controlled with current analgesics. Medication(s) being used: tramadol. Objective: There were no vitals taken for this visit. General:  alert, cooperative, no distress, appears stated age   ROM: -5/95; good quad strength on extension   Incision:   Slight serous oozing from the mid incision, incision well approximated, blistering at the medial aspect mid-incision, dermatitis at the proximal end of the incision, moderate swelling     Assessment:     Postoperative course complicated by skin blistering and irritation from the bandage adhesive. Plan:     1. Continue PT. 2. Wound care/showering discussed. Remove mesh tape later today. May use Benadryl cream for skin irritation. Will add 7 days Keflex for the blistering. 3. Continue DVT prophylaxis as directed. 4. Follow up on 4/4/2022 to recheck the skin.

## 2022-03-25 ENCOUNTER — HOSPITAL ENCOUNTER (OUTPATIENT)
Dept: PHYSICAL THERAPY | Age: 72
Discharge: HOME | End: 2022-03-25
Payer: MEDICARE

## 2022-03-25 PROCEDURE — 97140 MANUAL THERAPY 1/> REGIONS: CPT

## 2022-03-25 PROCEDURE — 97110 THERAPEUTIC EXERCISES: CPT

## 2022-03-25 NOTE — PROGRESS NOTES
PT DAILY TREATMENT NOTE - [facility] 2-15    Patient Name: Jose Garcia  Date:3/25/2022  : 1950  [x]  Patient  Verified  Payor: Monica Walters / Plan: [place] MEDICARE PART A & B / Product Type: Medicare /    In time:  4390 am  Out time:  1225 pm  Total Treatment Time (min): 50  Total Timed Codes (min): 42  1:1 Treatment Time ( only): 42  Visit #: 3             Treatment Area: Unilateral primary osteoarthritis, left knee [M17.12]  Pain in left knee [M25.562]  Other chronic pain [G89.29]    SUBJECTIVE  Pain Level (0-10 scale): 2  Any medication changes, allergies to medications, adverse drug reactions, diagnosis change, or new procedure performed?: [x] No    [] Yes (see summary sheet for update)  Subjective functional status/changes:   [] No changes reported  It has not been bleeding lately, but it is pretty stiff. I have a rash on it, im allergic to the bandage. OBJECTIVE    27 min Therapeutic Exercise:  [x] See flow sheet : Includes time spent observing response to exercise and discussing care. Rationale: increase ROM and increase strength to improve the patients ability to walk without limitations. 10 min Manual Therapy: Retrograde Massage and PROM R knee    Rationale: decrease pain, increase ROM, increase tissue extensibility and decrease edema  to improve the patients ability to walk without limitations          With   [x] TE   [] TA   [] neuro   [] other: Patient Education: [x] Review HEP    [] Progressed/Changed HEP based on:   [] positioning   [] body mechanics   [] transfers   [] heat/ice application    [] other:      Other Objective/Functional Measures: bruising present R anterior knee   PROM R knee extension:   -11 deg. Flexion:  68 deg. Pain Level (0-10 scale) post treatment: 1    ASSESSMENT/Changes in Function:   Patient did well with exercises and has no new active bleeding. Limited ROM R knee due to swelling and stiffness. Walks without assistive device with mild deviation.   Patient will continue to benefit from skilled PT services to modify and progress therapeutic interventions, address functional mobility deficits, address ROM deficits, address strength deficits and analyze and cue movement patterns to attain remaining goals. [x]  See Plan of Care  []  See progress note/recertification  []  See Discharge Summary         Progress towards goals / Updated goals:  Short Term Goals: To be accomplished in 4 weeks:              1. Indp with HEP               2. PROM:  0 to 115  Long Term Goals: To be accomplished in 4 weeks:              1.  Gait normal on even surfaces. 2.  Able to negotiate uneven surfaces              3.  Unilateral stance, > 10 seconds.      PLAN  [x]  Upgrade activities as tolerated     [x]  Continue plan of care  []  Update interventions per flow sheet       []  Discharge due to:_  []  Other:_      Roland Bender., PTA 3/25/2022

## 2022-03-28 ENCOUNTER — HOSPITAL ENCOUNTER (OUTPATIENT)
Dept: PHYSICAL THERAPY | Age: 72
Discharge: HOME | End: 2022-03-28
Payer: MEDICARE

## 2022-03-28 ENCOUNTER — TELEPHONE (OUTPATIENT)
Dept: ORTHOPEDIC SURGERY | Age: 72
End: 2022-03-28

## 2022-03-28 PROCEDURE — 97110 THERAPEUTIC EXERCISES: CPT

## 2022-03-28 PROCEDURE — 97140 MANUAL THERAPY 1/> REGIONS: CPT

## 2022-03-28 RX ORDER — TRIAMCINOLONE ACETONIDE 1 MG/G
CREAM TOPICAL
Qty: 15 G | Refills: 0 | Status: SHIPPED | OUTPATIENT
Start: 2022-03-28 | End: 2022-05-03

## 2022-03-28 NOTE — TELEPHONE ENCOUNTER
Pt is requesting something else for her rash. She said she spoke with you regarding this and you called in something for her.      She asked that it be sent to:  [facility]

## 2022-03-28 NOTE — PROGRESS NOTES
PT DAILY TREATMENT NOTE - [facility] 2-15    Patient Name: Dianna Gutierrez  Date:3/28/2022  : 1950  [x]  Patient  Verified  Payor: [place] MEDICARE / Plan: [place] MEDICARE PART A & B / Product Type: Medicare /    In time:  250 pm am  Out time:  340pm  Total Treatment Time (min): 50  Total Timed Codes (min): 39  1:1 Treatment Time ( [address] only): 44  Visit #: 5             Treatment Area: Unilateral primary osteoarthritis, left knee [M17.12]  Pain in left knee [M25.562]  Other chronic pain [G89.29]    SUBJECTIVE  Pain Level (0-10 scale): 0  Any medication changes, allergies to medications, adverse drug reactions, diagnosis change, or new procedure performed?: [x] No    [] Yes (see summary sheet for update)  Subjective functional status/changes:   [] No changes reported  Itching from rash on her L knee. Is getting a steroid pack to help with that. OBJECTIVE    29 min Therapeutic Exercise:  [x] See flow sheet : Includes time spent observing response to exercise and discussing care. Rationale: increase ROM and increase strength to improve the patients ability to walk without limitations. 10 min Manual Therapy: Retrograde Massage and PROM R knee    Rationale: decrease pain, increase ROM, increase tissue extensibility and decrease edema  to improve the patients ability to walk without limitations          With   [x] TE   [] TA   [] neuro   [] other: Patient Education: [x] Review HEP    [] Progressed/Changed HEP based on:   [] positioning   [] body mechanics   [] transfers   [] heat/ice application    [] other:      Other Objective/Functional Measures: bruising improving R anterior knee, rash noted   PROM R knee extension:   -10 deg. Flexion:  80 deg. Pain Level (0-10 scale) post treatment: 0    ASSESSMENT/Changes in Function:   Patient did well with exercises and has no new active bleeding. Limited ROM R knee due to swelling and stiffness. Walks without assistive device with mild deviation.   Rash noted on L anterior leg around knee. Patient will continue to benefit from skilled PT services to modify and progress therapeutic interventions, address functional mobility deficits, address ROM deficits, address strength deficits and analyze and cue movement patterns to attain remaining goals. [x]  See Plan of Care  []  See progress note/recertification  []  See Discharge Summary         Progress towards goals / Updated goals:  Short Term Goals: To be accomplished in 4 weeks:              1. Indp with HEP MET              2. PROM:  0 to 115  Long Term Goals: To be accomplished in 4 weeks:              1.  Gait normal on even surfaces. 2.  Able to negotiate uneven surfaces              3.  Unilateral stance, > 10 seconds.      PLAN  [x]  Upgrade activities as tolerated     [x]  Continue plan of care  []  Update interventions per flow sheet       []  Discharge due to:_  []  Other:_      Hari Garner PT 3/28/2022

## 2022-03-30 ENCOUNTER — HOSPITAL ENCOUNTER (OUTPATIENT)
Dept: PHYSICAL THERAPY | Age: 72
Discharge: HOME | End: 2022-03-30
Payer: MEDICARE

## 2022-03-30 PROCEDURE — 97110 THERAPEUTIC EXERCISES: CPT

## 2022-03-30 PROCEDURE — 97140 MANUAL THERAPY 1/> REGIONS: CPT

## 2022-04-01 ENCOUNTER — HOSPITAL ENCOUNTER (OUTPATIENT)
Dept: PHYSICAL THERAPY | Age: 72
Discharge: HOME | End: 2022-04-01
Payer: MEDICARE

## 2022-04-01 PROCEDURE — 97110 THERAPEUTIC EXERCISES: CPT | Performed by: PHYSICAL THERAPIST

## 2022-04-01 PROCEDURE — 97140 MANUAL THERAPY 1/> REGIONS: CPT | Performed by: PHYSICAL THERAPIST

## 2022-04-01 NOTE — PROGRESS NOTES
[address] [facility],   [address]  [facility], [address]  Phone: [phone]   Fax: [phone]    Progress Note    Name: Dianna Gutierrez   : 1950   MD: Santino Shen MD       Treatment Diagnosis: Unilateral primary osteoarthritis, left knee [M17.12]  Pain in left knee [M25.562]  Other chronic pain [G89.29]  Start of Care: 3/18/2022    Visits from Start of Care: 7  Missed Visits: o    Summary of Care: STM, ROM, light strengthening    Subjective:  Mild c/o pain. Not sleeping well. Objective Measures:    Gait:  Decrease in left stance. Does not use available flexion. No assistive device. PROM:  -3 to 90 with effort. Muscle guarding. IMPRESSION:    Good progress.   Please advise and thank you for this referral.      Regards,     Candi Toledo PT, DPT             Candi Toledo PT 2022

## 2022-04-01 NOTE — PROGRESS NOTES
PT DAILY TREATMENT NOTE - [facility] 2-15    Patient Name: Bradley Khalil  WMAE:  : 1950  [x]  Patient  Verified  Payor: Yamila Roper / Plan: [place] MEDICARE PART A & B / Product Type: Medicare /    In time:  1115 am am  Out time:  1215  pm  Total Treatment Time (min): 60 MINUTES  Total Timed Codes (min): 37 MINUTES  1:1 Treatment Time ( only): 37 MINUTES  Visit #: 7            Treatment Area: Unilateral primary osteoarthritis, left knee [M17.12]  Pain in left knee [M25.562]  Other chronic pain [G89.29]    SUBJECTIVE  Pain Level (0-10 scale): 0  Any medication changes, allergies to medications, adverse drug reactions, diagnosis change, or new procedure performed?: [x] No    [] Yes (see summary sheet for update)  Subjective functional status/changes:   [] No changes reported  Not sleeping well. Going to ortho Monday. OBJECTIVE    23 min Therapeutic Exercise:  [x] See flow sheet : Includes time spent observing response to exercise and discussing care. Rationale: increase ROM and increase strength to improve the patients ability to walk without limitations. 20 min Manual Therapy: Retrograde Massage and PROM R knee , contract relax    Rationale: decrease pain, increase ROM, increase tissue extensibility and decrease edema  to improve the patients ability to walk without limitations          With   [x] TE   [] TA   [] neuro   [] other: Patient Education: [x] Review HEP  , prone, prone HS curls, prone hip extension. Written instructions  [] Progressed/Changed HEP based on:   [] positioning   [] body mechanics   [] transfers   [] heat/ice application    [] other:      Other Objective/Functional Measures: rash expanding down her R leg, scar looks good   PROM R knee extension:   -3 deg. Flexion:  90 deg.,  Muscle guarding    Emphasized importance of using available knee flexion during gait. See note to MD. Massey  with patient.      Pain Level (0-10 scale) post treatment: 0    ASSESSMENT/Changes in Function:   Patient did well with exercises. Limited ROM R knee due to swelling and stiffness. Walks without assistive device with mild deviation. Rash noted on L anterior leg around knee is expanding but Prednisone cream makes the itching better. Patient will continue to benefit from skilled PT services to modify and progress therapeutic interventions, address functional mobility deficits, address ROM deficits, address strength deficits and analyze and cue movement patterns to attain remaining goals. [x]  See Plan of Care  []  See progress note/recertification  []  See Discharge Summary         Progress towards goals / Updated goals:  Short Term Goals: To be accomplished in 4 weeks:              1. Indp with HEP MET              2. PROM:  0 to 115  Long Term Goals: To be accomplished in 4 weeks:              1.  Gait normal on even surfaces. 2.  Able to negotiate uneven surfaces              3.  Unilateral stance, > 10 seconds.      PLAN  [x]  Upgrade activities as tolerated     [x]  Continue plan of care  []  Update interventions per flow sheet       []  Discharge due to:_  []  Other:_      April Roc-Bhumi, PT 4/1/2022

## 2022-04-04 ENCOUNTER — OFFICE VISIT (OUTPATIENT)
Dept: ORTHOPEDIC SURGERY | Age: 72
End: 2022-04-04
Payer: MEDICARE

## 2022-04-04 DIAGNOSIS — Z96.652 STATUS POST LEFT KNEE REPLACEMENT: Primary | ICD-10-CM

## 2022-04-04 PROCEDURE — 99024 POSTOP FOLLOW-UP VISIT: CPT | Performed by: NURSE PRACTITIONER

## 2022-04-04 NOTE — PROGRESS NOTES
Subjective:      Patient presents for postop care following left TKA. Surgery was on 3/17/2022. Ambulating independently. Pain is controlled with current analgesics. Medication(s) being used: tramadol. Area of skin blistering near the incision now fully healed. Objective: There were no vitals taken for this visit. General:  alert, cooperative, no distress, appears stated age   ROM: -3/90   Incision:   no erythema, fully healed, mild joint swelling     Assessment:     Doing well postoperatively. Plan:     1. Continue PT. 2. Continue DVT prophylaxis as directed. 3. Pt is to increase activities as tolerated. 4. May drive starting today. 5. Follow up at 1 yr with Dr. Tong Vann for xray's of the left knee and as needed. Brian Shearer

## 2022-04-05 ENCOUNTER — HOSPITAL ENCOUNTER (OUTPATIENT)
Dept: PHYSICAL THERAPY | Age: 72
Discharge: HOME | End: 2022-04-05
Payer: MEDICARE

## 2022-04-05 PROCEDURE — 97140 MANUAL THERAPY 1/> REGIONS: CPT | Performed by: PHYSICAL THERAPIST

## 2022-04-05 PROCEDURE — 97110 THERAPEUTIC EXERCISES: CPT | Performed by: PHYSICAL THERAPIST

## 2022-04-05 NOTE — PROGRESS NOTES
PT DAILY TREATMENT NOTE - [facility] 2-15    Patient Name: Bradley Khalil  Date:2022  : 1950  [x]  Patient  Verified  Payor: Yamila Roper / Plan: [place] MEDICARE PART A & B / Product Type: Medicare /    In time:  5802 am am  Out time:  71187  am  Total Treatment Time (min): 75 MINUTES  Total Timed Codes (min): 57MINUTES  1:1 Treatment Time ( only): 62 MINUTES  Visit #: 8          Treatment Area: Unilateral primary osteoarthritis, left knee [M17.12]  Pain in left knee [M25.562]  Other chronic pain [G89.29]    SUBJECTIVE  Pain Level (0-10 scale): 1  Any medication changes, allergies to medications, adverse drug reactions, diagnosis change, or new procedure performed?: [x] No    [] Yes (see summary sheet for update)  Subjective functional status/changes:   [] No changes reported  MD wants my bend to be 110 the next time I see him . Overall, pleased with progress. OBJECTIVE    37 min Therapeutic Exercise:  [x] See flow sheet : Includes time spent observing response to exercise and discussing care. Rationale: increase ROM and increase strength to improve the patients ability to walk without limitations. 20 min Manual Therapy: Retrograde Massage and PROM R knee , contract relax    Rationale: decrease pain, increase ROM, increase tissue extensibility and decrease edema  to improve the patients ability to walk without limitations          With   [x] TE   [] TA   [] neuro   [] other: Patient Education: [x] Review HEP  , prone, prone HS curls, prone hip extension. Written instructions  [] Progressed/Changed HEP based on:   [] positioning   [] body mechanics   [] transfers   [] heat/ice application    [] other:      Other Objective/Functional Measures: rash expanding down her R leg, scar looks good   PROM R knee extension:   -3 deg. Flexion:  90 deg.,  Muscle guarding    Emphasized importance of using available knee flexion during gait.        Pain Level (0-10 scale) post treatment: 0    ASSESSMENT/Changes in Function:   Patient did well with exercises. Limited ROM R knee due to swelling and stiffness. Walks without assistive device with mild deviation. Rash noted on L anterior leg around knee is expanding but Prednisone cream makes the itching better. Patient will continue to benefit from skilled PT services to modify and progress therapeutic interventions, address functional mobility deficits, address ROM deficits, address strength deficits and analyze and cue movement patterns to attain remaining goals. [x]  See Plan of Care  []  See progress note/recertification  []  See Discharge Summary         Progress towards goals / Updated goals:  Short Term Goals: To be accomplished in 4 weeks:              1. Indp with HEP MET              2. PROM:  0 to 115  Long Term Goals: To be accomplished in 4 weeks:              1.  Gait normal on even surfaces. 2.  Able to negotiate uneven surfaces              3.  Unilateral stance, > 10 seconds. PLAN  [x]  Upgrade activities as tolerated     [x]  Continue plan of care  Add static stretch next session.    []  Update interventions per flow sheet       []  Discharge due to:_  []  Other:_      April Roc-Bhumi, PT 4/5/2022

## 2022-04-07 ENCOUNTER — APPOINTMENT (OUTPATIENT)
Dept: PHYSICAL THERAPY | Age: 72
End: 2022-04-07
Payer: MEDICARE

## 2022-04-08 ENCOUNTER — HOSPITAL ENCOUNTER (OUTPATIENT)
Dept: PHYSICAL THERAPY | Age: 72
Discharge: HOME | End: 2022-04-08
Payer: MEDICARE

## 2022-04-08 PROCEDURE — 97140 MANUAL THERAPY 1/> REGIONS: CPT

## 2022-04-08 PROCEDURE — 97110 THERAPEUTIC EXERCISES: CPT

## 2022-04-08 NOTE — PROGRESS NOTES
PT DAILY TREATMENT NOTE - [facility] 2-15    Patient Name: Dia Levin  Date:2022  : 1950  [x]  Patient  Verified  Payor: Nilsa Brennan / Plan: [place] MEDICARE PART A & B / Product Type: Medicare /    In time:  315 pm  Out time:  400 pm  Total Treatment Time (min): 45 MINUTES  Total Timed Codes (min): 40 MINUTES  1:1 Treatment Time ( only): 36 MINUTES  Visit #: 9    Treatment Area: Unilateral primary osteoarthritis, left knee [M17.12]  Pain in left knee [M25.562]  Other chronic pain [G89.29]    SUBJECTIVE  Pain Level (0-10 scale): 2  Any medication changes, allergies to medications, adverse drug reactions, diagnosis change, or new procedure performed?: [x] No    [] Yes (see summary sheet for update)  Subjective functional status/changes:   [] No changes reported  MD wants my bend to be 110 the next time I see him . Stiffness today. Trying the new HEP. OBJECTIVE    20 min Therapeutic Exercise:  [x] See flow sheet : Includes time spent observing response to exercise and discussing care. Rationale: increase ROM and increase strength to improve the patients ability to walk without limitations. 20 min Manual Therapy: Retrograde Massage and PROM R knee , contract relax    Rationale: decrease pain, increase ROM, increase tissue extensibility and decrease edema  to improve the patients ability to walk without limitations          With   [x] TE   [] TA   [] neuro   [] other: Patient Education: [x] Review HEP  , prone, prone HS curls, prone hip extension. Written instructions  [] Progressed/Changed HEP based on:   [] positioning   [] body mechanics   [] transfers   [] heat/ice application    [] other:      Other Objective/Functional Measures: rash expanding down her R leg, scar looks good   PROM R knee extension:   -5 deg. Flexion:  93 deg.,  Muscle guarding   AROM R knee Flexion:   98 deg    Emphasized importance of using available knee flexion during gait.        Pain Level (0-10 scale) post treatment: 0    ASSESSMENT/Changes in Function:   Patient did well with exercises. Limited ROM R knee due to swelling and stiffness but this is improving. Walks without assistive device with mild deviation and stiffness in her knee. Patient will continue to benefit from skilled PT services to modify and progress therapeutic interventions, address functional mobility deficits, address ROM deficits, address strength deficits and analyze and cue movement patterns to attain remaining goals. [x]  See Plan of Care  []  See progress note/recertification  []  See Discharge Summary         Progress towards goals / Updated goals:  Short Term Goals: To be accomplished in 4 weeks:              1. Indp with HEP MET              2. PROM:  0 to 115  Long Term Goals: To be accomplished in 4 weeks:              1.  Gait normal on even surfaces. 2.  Able to negotiate uneven surfaces              3.  Unilateral stance, > 10 seconds. PLAN  [x]  Upgrade activities as tolerated     [x]  Continue plan of care  Add static stretch next session.    []  Update interventions per flow sheet       []  Discharge due to:_  []  Other:_      Yaz Patterson, PT 4/8/2022

## 2022-04-11 ENCOUNTER — HOSPITAL ENCOUNTER (OUTPATIENT)
Dept: PHYSICAL THERAPY | Age: 72
Discharge: HOME | End: 2022-04-11
Payer: MEDICARE

## 2022-04-11 PROCEDURE — 97110 THERAPEUTIC EXERCISES: CPT

## 2022-04-11 PROCEDURE — 97140 MANUAL THERAPY 1/> REGIONS: CPT

## 2022-04-11 NOTE — PROGRESS NOTES
PT DAILY TREATMENT NOTE - [facility] 2-15    Patient Name: Phil Cardona  Date:2022  : 1950  [x]  Patient  Verified  Payor: Eric Mays / Plan: [place] MEDICARE PART A & B / Product Type: Medicare /    In time:  100 pm  Out time:  150 pm  Total Treatment Time (min): 50  MINUTES  Total Timed Codes (min): 50 MINUTES  1:1 Treatment Time ( only): 50 MINUTES  Visit #: 10    Treatment Area: Unilateral primary osteoarthritis, left knee [M17.12]  Pain in left knee [M25.562]  Other chronic pain [G89.29]    SUBJECTIVE  Pain Level (0-10 scale): 0  Any medication changes, allergies to medications, adverse drug reactions, diagnosis change, or new procedure performed?: [x] No    [] Yes (see summary sheet for update)  Subjective functional status/changes:   [] No changes reported  MD wants my bend to be 110 the next time I see him . Stiffness in her knee. Working on the new HEP but the prone hang is hard and she can only do it for 30 seconds. OBJECTIVE    33 min Therapeutic Exercise:  [x] See flow sheet : Includes time spent observing response to exercise and discussing care. Rationale: increase ROM and increase strength to improve the patients ability to walk without limitations. 15 min Manual Therapy: Retrograde Massage and PROM R knee , contract relax    Rationale: decrease pain, increase ROM, increase tissue extensibility and decrease edema  to improve the patients ability to walk without limitations          With   [x] TE   [] TA   [] neuro   [] other: Patient Education: [x] Review HEP  , prone, prone HS curls, prone hip extension. Written instructions  [] Progressed/Changed HEP based on:   [] positioning   [] body mechanics   [] transfers   [] heat/ice application    [] other:      Other Objective/Functional Measures: rash expanding down her R leg, scar looks good   PROM R knee extension:   -5 deg.         Flexion:  90 deg.,  Muscle guarding   AROM R knee Flexion:   83 deg    Emphasized importance of using available knee flexion during gait. Advised to use heat prior to HEP stretching    Pain Level (0-10 scale) post treatment: 0    ASSESSMENT/Changes in Function:   Patient did well with exercises. Limited ROM R knee due to swelling and stiffness. Walks without assistive device with mild deviation and stiffness in her knee. Patient will continue to benefit from skilled PT services to modify and progress therapeutic interventions, address functional mobility deficits, address ROM deficits, address strength deficits and analyze and cue movement patterns to attain remaining goals. [x]  See Plan of Care  []  See progress note/recertification  []  See Discharge Summary         Progress towards goals / Updated goals:  Short Term Goals: To be accomplished in 4 weeks:              1. Indp with HEP MET              2. PROM:  0 to 115  Long Term Goals: To be accomplished in 4 weeks:              1.  Gait normal on even surfaces. 2.  Able to negotiate uneven surfaces              3.  Unilateral stance, > 10 seconds. PLAN  [x]  Upgrade activities as tolerated     [x]  Continue plan of care  Add static stretch next session.    []  Update interventions per flow sheet       []  Discharge due to:_  []  Other:_      Radha Webster, PT 4/11/2022

## 2022-04-13 ENCOUNTER — HOSPITAL ENCOUNTER (OUTPATIENT)
Dept: PHYSICAL THERAPY | Age: 72
Discharge: HOME | End: 2022-04-13
Payer: MEDICARE

## 2022-04-13 PROCEDURE — 97140 MANUAL THERAPY 1/> REGIONS: CPT

## 2022-04-13 PROCEDURE — 97110 THERAPEUTIC EXERCISES: CPT

## 2022-04-13 NOTE — PROGRESS NOTES
PT DAILY TREATMENT NOTE - [facility] 2-15    Patient Name: Starr Lundberg  Date:2022  : 1950  [x]  Patient  Verified  Payor: Jr Haskins / Plan: [place] MEDICARE PART A & B / Product Type: Medicare /    In time:  109 pm  Out time:  150 pm  Total Treatment Time (min): 41  MINUTES  Total Timed Codes (min): 30 MINUTES  1:1 Treatment Time ( only): 30 MINUTES  Visit #: 12    Treatment Area: Unilateral primary osteoarthritis, left knee [M17.12]  Pain in left knee [M25.562]  Other chronic pain [G89.29]    SUBJECTIVE  Pain Level (0-10 scale): 0  Any medication changes, allergies to medications, adverse drug reactions, diagnosis change, or new procedure performed?: [x] No    [] Yes (see summary sheet for update)  Subjective functional status/changes:   [] No changes reported  Patient states she continues to do HEP and is trying to reach 110 degrees per MD orders. OBJECTIVE    22 min Therapeutic Exercise:  [x] See flow sheet : Includes time spent observing response to exercise and discussing care. Rationale: increase ROM and increase strength to improve the patients ability to walk without limitations. 8 min Manual Therapy: Retrograde Massage and PROM R knee , contract relax    Rationale: decrease pain, increase ROM, increase tissue extensibility and decrease edema  to improve the patients ability to walk without limitations          With   [x] TE   [] TA   [] neuro   [] other: Patient Education: [x] Review HEP  , prone, prone HS curls, prone hip extension. Written instructions  [] Progressed/Changed HEP based on:   [] positioning   [] body mechanics   [] transfers   [] heat/ice application    [] other:      Other Objective/Functional Measures: rash expanding down her R leg, scar looks good   PROM R knee extension:   -5 deg. Flexion:  90 deg.,  Muscle guarding   AROM R knee Flexion:   78 deg    Emphasized importance of using available knee flexion during gait.      Advised to use heat prior to HEP stretching    Pain Level (0-10 scale) post treatment: 0    ASSESSMENT/Changes in Function:   Patient did well with exercises. Patient able to achieve increased ROM in knee after manual was performed. She tolerated Patient will continue to benefit from skilled PT services to modify and progress therapeutic interventions, address functional mobility deficits, address ROM deficits, address strength deficits and analyze and cue movement patterns to attain remaining goals. [x]  See Plan of Care  []  See progress note/recertification  []  See Discharge Summary         Progress towards goals / Updated goals:  Short Term Goals: To be accomplished in 4 weeks:              1. Indp with HEP MET              2. PROM:  0 to 115  Long Term Goals: To be accomplished in 4 weeks:              1.  Gait normal on even surfaces. 2.  Able to negotiate uneven surfaces              3.  Unilateral stance, > 10 seconds. PLAN  [x]  Upgrade activities as tolerated     [x]  Continue plan of care  Add static stretch next session.    []  Update interventions per flow sheet       []  Discharge due to:_  []  Other:_      Donta Steele., PTA 4/13/2022

## 2022-04-15 ENCOUNTER — HOSPITAL ENCOUNTER (OUTPATIENT)
Dept: PHYSICAL THERAPY | Age: 72
Discharge: HOME | End: 2022-04-15
Payer: MEDICARE

## 2022-04-15 PROCEDURE — 97140 MANUAL THERAPY 1/> REGIONS: CPT

## 2022-04-15 PROCEDURE — 97110 THERAPEUTIC EXERCISES: CPT

## 2022-04-15 NOTE — PROGRESS NOTES
PT DAILY TREATMENT NOTE - [facility] 2-15    Patient Name: Erik Morgan  Date:4/15/2022  : 1950  [x]  Patient  Verified  Payor: Gilberto Rosales / Plan: [place] MEDICARE PART A & B / Product Type: Medicare /    In time:  1120 am  Out time:  1210 pm  Total Treatment Time (min): 50  MINUTES  Total Timed Codes (min): 39 MINUTES  1:1 Treatment Time ( only): 39 MINUTES  Visit #: 12    Treatment Area: Unilateral primary osteoarthritis, left knee [M17.12]  Pain in left knee [M25.562]  Other chronic pain [G89.29]    SUBJECTIVE  Pain Level (0-10 scale): 0  Any medication changes, allergies to medications, adverse drug reactions, diagnosis change, or new procedure performed?: [x] No    [] Yes (see summary sheet for update)  Subjective functional status/changes:   [] No changes reported  Patient states she continues to do HEP and is trying to reach 110 degrees per MD orders. OBJECTIVE    25 min Therapeutic Exercise:  [x] See flow sheet : Includes time spent observing response to exercise and discussing care. Rationale: increase ROM and increase strength to improve the patients ability to walk without limitations. 15 min Manual Therapy: Retrograde Massage and PROM R knee , contract relax    Rationale: decrease pain, increase ROM, increase tissue extensibility and decrease edema  to improve the patients ability to walk without limitations          With   [x] TE   [] TA   [] neuro   [] other: Patient Education: [x] Review HEP  , prone, prone HS curls, prone hip extension. Written instructions  [] Progressed/Changed HEP based on:   [] positioning   [] body mechanics   [] transfers   [] heat/ice application    [] other:      Other Objective/Functional Measures: rash expanding down her R leg, scar looks good   PROM R knee extension:   -3 deg. Flexion:  95 deg.,  Muscle guarding   AROM R knee Flexion:   90 deg    Emphasized importance of using available knee flexion during gait.      Advised to use heat prior to HEP stretching    Pain Level (0-10 scale) post treatment: 0    ASSESSMENT/Changes in Function:   Patient did well with exercises. Patient able to achieve increased ROM in knee after manual was performed. She tolerated Patient will continue to benefit from skilled PT services to modify and progress therapeutic interventions, address functional mobility deficits, address ROM deficits, address strength deficits and analyze and cue movement patterns to attain remaining goals. [x]  See Plan of Care  []  See progress note/recertification  []  See Discharge Summary         Progress towards goals / Updated goals:  Short Term Goals: To be accomplished in 4 weeks:              1. Indp with HEP MET              2. PROM:  0 to 115  Long Term Goals: To be accomplished in 4 weeks:              1.  Gait normal on even surfaces. 2.  Able to negotiate uneven surfaces              3.  Unilateral stance, > 10 seconds. PLAN  [x]  Upgrade activities as tolerated     [x]  Continue plan of care  Add static stretch next session.    []  Update interventions per flow sheet       []  Discharge due to:_  []  Other:_      Franki Larson, PT 4/15/2022

## 2022-04-15 NOTE — PROGRESS NOTES
PT DAILY TREATMENT NOTE - [facility] -15    Patient Name: Keesha Alvarez  Date:4/15/2022  : 1950  [x]  Patient  Verified  Payor: King Murphy / Plan: [place] / Product Type: Medicare /    In time:  1010 am  Out time:  1130 am  Total Treatment Time (min): Total Timed Codes (min): 30 MINUTES  1:1 Treatment Time ( only): 30 MINUTES  Visit #: 12    Treatment Area: Unilateral primary osteoarthritis, left knee [M17.12]  Pain in left knee [M25.562]  Other chronic pain [G89.29]    SUBJECTIVE  Pain Level (0-10 scale): 0  Any medication changes, allergies to medications, adverse drug reactions, diagnosis change, or new procedure performed?: [x] No    [] Yes (see summary sheet for update)  Subjective functional status/changes:   [] No changes reported  Patient states she continues to do HEP and is trying to reach 110 degrees per MD orders. OBJECTIVE    22 min Therapeutic Exercise:  [x] See flow sheet : Includes time spent observing response to exercise and discussing care. Rationale: increase ROM and increase strength to improve the patients ability to walk without limitations. 8 min Manual Therapy: Retrograde Massage and PROM R knee , contract relax    Rationale: decrease pain, increase ROM, increase tissue extensibility and decrease edema  to improve the patients ability to walk without limitations          With   [x] TE   [] TA   [] neuro   [] other: Patient Education: [x] Review HEP  , prone, prone HS curls, prone hip extension. Written instructions  [] Progressed/Changed HEP based on:   [] positioning   [] body mechanics   [] transfers   [] heat/ice application    [] other:      Other Objective/Functional Measures: rash expanding down her R leg, scar looks good   PROM R knee extension:   -5 deg. Flexion:  90 deg.,  Muscle guarding   AROM R knee Flexion:   78 deg    Emphasized importance of using available knee flexion during gait.      Advised to use heat prior to HEP stretching    Pain Level (0-10 scale) post treatment: 0    ASSESSMENT/Changes in Function:   Patient did well with exercises. Patient able to achieve increased ROM in knee after manual was performed. She tolerated Patient will continue to benefit from skilled PT services to modify and progress therapeutic interventions, address functional mobility deficits, address ROM deficits, address strength deficits and analyze and cue movement patterns to attain remaining goals. [x]  See Plan of Care  []  See progress note/recertification  []  See Discharge Summary         Progress towards goals / Updated goals:  Short Term Goals: To be accomplished in 4 weeks:              1. Indp with HEP MET              2. PROM:  0 to 115  Long Term Goals: To be accomplished in 4 weeks:              1.  Gait normal on even surfaces. 2.  Able to negotiate uneven surfaces              3.  Unilateral stance, > 10 seconds. PLAN  [x]  Upgrade activities as tolerated     [x]  Continue plan of care  Add static stretch next session.    []  Update interventions per flow sheet       []  Discharge due to:_  []  Other:_      April Roc-Bhumi, PT 4/15/2022

## 2022-04-18 ENCOUNTER — HOSPITAL ENCOUNTER (OUTPATIENT)
Dept: PHYSICAL THERAPY | Age: 72
Discharge: HOME | End: 2022-04-18
Payer: MEDICARE

## 2022-04-18 PROCEDURE — 97110 THERAPEUTIC EXERCISES: CPT | Performed by: PHYSICAL THERAPIST

## 2022-04-18 PROCEDURE — 97140 MANUAL THERAPY 1/> REGIONS: CPT | Performed by: PHYSICAL THERAPIST

## 2022-04-18 NOTE — PROGRESS NOTES
PT DAILY TREATMENT NOTE - [facility] 2-15    Patient Name: Nidia Ferraro  Date:2022  : 1950  [x]  Patient  Verified  Payor: Chelle Clark / Plan: [place] MED[place] PART A & B / Product Type: Medicare /    In time:  100 pm  Out time:  1210 pm  Total Treatment Time (min): 50  MINUTES  Total Timed Codes (min): 39 MINUTES  1:1 Treatment Time ( only): 39 MINUTES  Visit #: 13    Treatment Area: Unilateral primary osteoarthritis, left knee [M17.12]  Pain in left knee [M25.562]  Other chronic pain [G89.29]    SUBJECTIVE  Pain Level (0-10 scale):  2 \"stiff\"    Any medication changes, allergies to medications, adverse drug reactions, diagnosis change, or new procedure performed?: [x] No    [] Yes (see summary sheet for update)  Subjective functional status/changes:   [] No changes reported    Slept great last night for the first time in along time. OBJECTIVE    36 min Therapeutic Exercise:  [x] See flow sheet : Includes time spent observing response to exercise and discussing care. Rationale: increase ROM and increase strength to improve the patients ability to walk without limitations. 15 min Manual Therapy: Retrograde Massage and PROM R knee , contract relax    Rationale: decrease pain, increase ROM, increase tissue extensibility and decrease edema  to improve the patients ability to walk without limitations          With   [x] TE   [] TA   [] neuro   [] other: Patient Education: [x] Review HEP  Discussed HEP with regard how intense stretch should be felt. [] Progressed/Changed HEP based on:   [] positioning   [] body mechanics   [] transfers   [] heat/ice application    [] other:      Other Objective/Functional Measures:    PROM R knee extension:   -3 deg. Flexion:  95 deg.,  Muscle guarding      Emphasized importance of using available knee flexion during gait. Pain Level (0-10 scale) post treatment: 0    ASSESSMENT/Changes in Function:   Patient did well with exercises.  Patient able to achieve increased ROM in knee after manual was performed. She tolerated Patient will continue to benefit from skilled PT services to modify and progress therapeutic interventions, address functional mobility deficits, address ROM deficits, address strength deficits and analyze and cue movement patterns to attain remaining goals. [x]  See Plan of Care  []  See progress note/recertification  []  See Discharge Summary         Progress towards goals / Updated goals:  Short Term Goals: To be accomplished in 4 weeks:              1. Indp with HEP MET              2. PROM:  0 to 115   Long Term Goals: To be accomplished in 4 weeks:              1.  Gait normal on even surfaces. , progressing               2.  Able to negotiate uneven surfaces              3.  Unilateral stance, > 10 seconds. PLAN  [x]  Upgrade activities as tolerated     [x]  Continue plan of care  Add static stretch next session.    []  Update interventions per flow sheet       []  Discharge due to:_  []  Other:_      April Roc-Bhumi, PT 4/18/2022

## 2022-04-18 NOTE — PROGRESS NOTES
PT DAILY TREATMENT NOTE - [facility] 2-15    Patient Name: Madi Parker  Date:2022  : 1950  [x]  Patient  Verified  Payor: Sera Flores / Plan: [place] MEDICARE PART A & B / Product Type: Medicare /    In time:  100 pm  Out time:  1210 pm  Total Treatment Time (min): 50  MINUTES  Total Timed Codes (min): 39 MINUTES  1:1 Treatment Time ( only): 39 MINUTES  Visit #: 12    Treatment Area: Unilateral primary osteoarthritis, left knee [M17.12]  Pain in left knee [M25.562]  Other chronic pain [G89.29]    SUBJECTIVE  Pain Level (0-10 scale): Any medication changes, allergies to medications, adverse drug reactions, diagnosis change, or new procedure performed?: [x] No    [] Yes (see summary sheet for update)  Subjective functional status/changes:   [] No changes reported  Patient states she continues to do HEP and is trying to reach 110 degrees per MD orders. OBJECTIVE    25 min Therapeutic Exercise:  [x] See flow sheet : Includes time spent observing response to exercise and discussing care. Rationale: increase ROM and increase strength to improve the patients ability to walk without limitations. 15 min Manual Therapy: Retrograde Massage and PROM R knee , contract relax    Rationale: decrease pain, increase ROM, increase tissue extensibility and decrease edema  to improve the patients ability to walk without limitations          With   [x] TE   [] TA   [] neuro   [] other: Patient Education: [x] Review HEP  , prone, prone HS curls, prone hip extension. Written instructions  [] Progressed/Changed HEP based on:   [] positioning   [] body mechanics   [] transfers   [] heat/ice application    [] other:      Other Objective/Functional Measures: rash expanding down her R leg, scar looks good   PROM R knee extension:   -3 deg. Flexion:  95 deg.,  Muscle guarding   AROM R knee Flexion:   90 deg    Emphasized importance of using available knee flexion during gait.      Advised to use heat prior to HEP stretching    Pain Level (0-10 scale) post treatment: 0    ASSESSMENT/Changes in Function:   Patient did well with exercises. Patient able to achieve increased ROM in knee after manual was performed. She tolerated Patient will continue to benefit from skilled PT services to modify and progress therapeutic interventions, address functional mobility deficits, address ROM deficits, address strength deficits and analyze and cue movement patterns to attain remaining goals. [x]  See Plan of Care  []  See progress note/recertification  []  See Discharge Summary         Progress towards goals / Updated goals:  Short Term Goals: To be accomplished in 4 weeks:              1. Indp with HEP MET              2. PROM:  0 to 115  Long Term Goals: To be accomplished in 4 weeks:              1.  Gait normal on even surfaces. 2.  Able to negotiate uneven surfaces              3.  Unilateral stance, > 10 seconds. PLAN  [x]  Upgrade activities as tolerated     [x]  Continue plan of care  Add static stretch next session.    []  Update interventions per flow sheet       []  Discharge due to:_  []  Other:_      April Roc-Bhumi, PT 4/18/2022

## 2022-04-19 ENCOUNTER — TELEPHONE (OUTPATIENT)
Dept: ORTHOPEDIC SURGERY | Age: 72
End: 2022-04-19

## 2022-04-19 NOTE — TELEPHONE ENCOUNTER
Patient would like to know if she can have a note to return back to work the first week of May. Patient stated that she is already PRN and would like for the note to say for the first month if she could only work one day a week.

## 2022-04-20 ENCOUNTER — HOSPITAL ENCOUNTER (OUTPATIENT)
Dept: PHYSICAL THERAPY | Age: 72
Discharge: HOME | End: 2022-04-20
Payer: MEDICARE

## 2022-04-20 PROCEDURE — 97140 MANUAL THERAPY 1/> REGIONS: CPT | Performed by: PHYSICAL THERAPIST

## 2022-04-20 PROCEDURE — 97110 THERAPEUTIC EXERCISES: CPT | Performed by: PHYSICAL THERAPIST

## 2022-04-20 NOTE — PROGRESS NOTES
PT DAILY TREATMENT NOTE - [facility] 2-15    Patient Name: Charmaine Castillo  Date:2022  : 1950  [x]  Patient  Verified  Payor: [place] MEDICARE / Plan: [place] MEDICARE PART A & B / Product Type: Medicare /    In time:  1120 am  Out time:  1225 pm  Total Treatment Time (min): 65  MINUTES  Total Timed Codes (min): 55 MINUTES  1:1 Treatment Time ( only): 55  MINUTES  Visit #: 13    Treatment Area: Unilateral primary osteoarthritis, left knee [M17.12]  Pain in left knee [M25.562]  Other chronic pain [G89.29]    SUBJECTIVE  Pain Level (0-10 scale):  0 \"stiff\"    Any medication changes, allergies to medications, adverse drug reactions, diagnosis change, or new procedure performed?: [x] No    [] Yes (see summary sheet for update)  Subjective functional status/changes:   [] No changes reported    Continues to sleep well . OBJECTIVE    31 min Therapeutic Exercise:  [x] See flow sheet : Includes time spent observing response to exercise and discussing care. Rationale: increase ROM and increase strength to improve the patients ability to walk without limitations. 15 min Manual Therapy: Retrograde Massage and PROM R knee , contract relax    Rationale: decrease pain, increase ROM, increase tissue extensibility and decrease edema  to improve the patients ability to walk without limitations          With   [x] TE   [] TA   [] neuro   [] other: Patient Education: [x] Review HEP  , patient to add weight to prone hang  [] Progressed/Changed HEP based on:   [] positioning   [] body mechanics   [] transfers   [] heat/ice application    [] other:      Other Objective/Functional Measures:    AROM R knee extension:   -3 deg. Flexion:  95 deg.,  Muscle guarding    Gait:  Improved quality. Using available knee flexion during swing, mid stance with observable decrease in extension. EZ stairs + handrail with reciprocal pattern. Difficulty descending.          Pain Level (0-10 scale) post treatment: 0    ASSESSMENT/Changes in Function:   Patient did well with exercises. Patient able to achieve increased ROM in knee after manual was performed. She tolerated Patient will continue to benefit from skilled PT services to modify and progress therapeutic interventions, address functional mobility deficits, address ROM deficits, address strength deficits and analyze and cue movement patterns to attain remaining goals. [x]  See Plan of Care  []  See progress note/recertification  []  See Discharge Summary         Progress towards goals / Updated goals:  Short Term Goals: To be accomplished in 4 weeks:              1. Indp with HEP MET              2. PROM:  0 to 115   Long Term Goals: To be accomplished in 4 weeks:              1.  Gait normal on even surfaces. , progressing               2.  Able to negotiate uneven surfaces              3.  Unilateral stance, > 10 seconds. PLAN  [x]  Upgrade activities as tolerated     [x]  Continue plan of care  Add static stretch next session.    []  Update interventions per flow sheet       []  Discharge due to:_  []  Other:_      April Roc-Bhumi, PT 4/20/2022

## 2022-04-22 ENCOUNTER — HOSPITAL ENCOUNTER (OUTPATIENT)
Dept: PHYSICAL THERAPY | Age: 72
Discharge: HOME | End: 2022-04-22
Payer: MEDICARE

## 2022-04-22 PROCEDURE — 97110 THERAPEUTIC EXERCISES: CPT

## 2022-04-22 PROCEDURE — 97140 MANUAL THERAPY 1/> REGIONS: CPT

## 2022-04-22 NOTE — PROGRESS NOTES
PT DAILY TREATMENT NOTE - [facility] 2-15    Patient Name: Charbel Castillo  Date:2022  : 1950  [x]  Patient  Verified  Payor: Ela Espinosa / Plan: [place] MEDICARE PART A & B / Product Type: Medicare /    In time:  1130 am  Out time:  1240 pm  Total Treatment Time (min): 70  MINUTES  Total Timed Codes (min): 40 MINUTES  1:1 Treatment Time ( only): 40  MINUTES  Visit #: 15    Treatment Area: Unilateral primary osteoarthritis, left knee [M17.12]  Pain in left knee [M25.562]  Other chronic pain [G89.29]    SUBJECTIVE  Pain Level (0-10 scale):  0-1 \"stiff\"    Any medication changes, allergies to medications, adverse drug reactions, diagnosis change, or new procedure performed?: [x] No    [] Yes (see summary sheet for update)  Subjective functional status/changes:   [] No changes reported  Incision line is still sensitive to touch, but not as bad as it used to be. She is still not at 110 degrees of flexion like the doctor wanted by today. Will touch base. OBJECTIVE    24 min Therapeutic Exercise:  [x] See flow sheet : Includes time spent observing response to exercise and discussing care. Rationale: increase ROM and increase strength to improve the patients ability to walk without limitations. 20 min Manual Therapy: Retrograde Massage and PROM R knee , contract relax    Rationale: decrease pain, increase ROM, increase tissue extensibility and decrease edema  to improve the patients ability to walk without limitations          With   [x] TE   [] TA   [] neuro   [] other: Patient Education: [x] Review HEP  , patient to add weight to prone hang  [] Progressed/Changed HEP based on:   [] positioning   [] body mechanics   [] transfers   [] heat/ice application    [] other:      Other Objective/Functional Measures:    AROM R knee extension:   -3 deg. Flexion:  96 deg.,  Muscle guarding   PROM R knee flexion:  90 deg    Gait:  Improved quality.   Using available knee flexion during swing, mid stance with observable decrease in extension. EZ stairs + handrail with reciprocal pattern. Difficulty descending. Pain Level (0-10 scale) post treatment: 0    ASSESSMENT/Changes in Function:   Patient did well with exercises. Patient able to achieve increased ROM in knee after manual was performed. However, she is still not at 110 degrees of flexion that her surgeon wanted at this point. Will send update to MD.   She tolerated Patient will continue to benefit from skilled PT services to modify and progress therapeutic interventions, address functional mobility deficits, address ROM deficits, address strength deficits and analyze and cue movement patterns to attain remaining goals. [x]  See Plan of Care  [x]  See progress note/recertification  []  See Discharge Summary         Progress towards goals / Updated goals:  Short Term Goals: To be accomplished in 4 weeks:              1. Indp with HEP MET              2. PROM:  0 to 115  NOT MET  Long Term Goals: To be accomplished in 4 weeks:              1.  Gait normal on even surfaces. , progressing               2.  Able to negotiate uneven surfaces              3.  Unilateral stance, > 10 seconds. PLAN  [x]  Upgrade activities as tolerated     [x]  Continue plan of care  Add static stretch next session.    []  Update interventions per flow sheet       []  Discharge due to:_  []  Other:_      Marilia Brantley, PT 4/22/2022

## 2022-04-22 NOTE — PROGRESS NOTES
PT DAILY TREATMENT NOTE - [facility] 2-15    Patient Name: Aman Aguero  Date:2022  : 1950  Visit #: 15    Treatment Area: Unilateral primary osteoarthritis, left knee [M17.12]  Pain in left knee [M25.562]  Other chronic pain [G89.29]    SUBJECTIVE  Pain Level (0-10 scale):  0-1 \"stiff\"    Incision line is still sensitive to touch, but not as bad as it used to be. She is still not at 110 degrees of flexion like the doctor wanted by today. Will touch base. OBJECTIVE  Other Objective/Functional Measures:    AROM R knee extension:   -3 deg. Flexion:  96 deg.,  Muscle guarding   PROM R knee flexion:  90 deg    Gait:  Improved quality. Using available knee flexion during swing, mid stance with observable decrease in extension. EZ stairs + handrail with reciprocal pattern. Difficulty descending. Pain Level (0-10 scale) post treatment: 0    ASSESSMENT/Changes in Function:   Patient did well with exercises. Patient able to achieve increased ROM in knee after manual was performed. However, she is still not at 110 degrees of flexion that her surgeon wanted at this point. Will send update to MD.   She tolerated Patient will continue to benefit from skilled PT services to modify and progress therapeutic interventions, address functional mobility deficits, address ROM deficits, address strength deficits and analyze and cue movement patterns to attain remaining goals. [x]  See Plan of Care  [x]  See progress note/recertification  []  See Discharge Summary         Progress towards goals / Updated goals:  Short Term Goals: To be accomplished in 4 weeks:              1. Indp with HEP MET              2. PROM:  0 to 115  NOT MET  Long Term Goals: To be accomplished in 4 weeks:              1.  Gait normal on even surfaces. , progressing               2.  Able to negotiate uneven surfaces              3.  Unilateral stance, > 10 seconds.      PLAN  [x]  Upgrade activities as tolerated     [x] Continue plan of care  Add static stretch next session.    []  Update interventions per flow sheet       []  Discharge due to:_  []  Other:_      Halima Madden, PT 4/22/2022

## 2022-04-25 ENCOUNTER — HOSPITAL ENCOUNTER (OUTPATIENT)
Dept: PHYSICAL THERAPY | Age: 72
Discharge: HOME | End: 2022-04-25
Payer: MEDICARE

## 2022-04-25 PROCEDURE — 97110 THERAPEUTIC EXERCISES: CPT | Performed by: PHYSICAL THERAPIST

## 2022-04-25 PROCEDURE — 97140 MANUAL THERAPY 1/> REGIONS: CPT | Performed by: PHYSICAL THERAPIST

## 2022-04-28 ENCOUNTER — TELEPHONE (OUTPATIENT)
Dept: ORTHOPEDIC SURGERY | Age: 72
End: 2022-04-28

## 2022-04-28 ENCOUNTER — HOSPITAL ENCOUNTER (OUTPATIENT)
Dept: PHYSICAL THERAPY | Age: 72
Discharge: HOME | End: 2022-04-28
Payer: MEDICARE

## 2022-04-28 PROCEDURE — 97110 THERAPEUTIC EXERCISES: CPT | Performed by: PHYSICAL THERAPIST

## 2022-04-28 PROCEDURE — 97140 MANUAL THERAPY 1/> REGIONS: CPT | Performed by: PHYSICAL THERAPIST

## 2022-04-28 NOTE — PROGRESS NOTES
PT DAILY TREATMENT NOTE - [facility] 2-15    Patient Name: Kaela Mckeon  Date:2022  : 1950  [x]  Patient  Verified  Payor: [place] MEDICARE / Plan: [place] MEDICARE PART A & B / Product Type: Medicare /    In time:  1:45 pm  Out time:  300 pm  Total Treatment Time (min): 75  MINUTES  Total Timed Codes (min): 52 MINUTES  1:1 Treatment Time ( only): 52  MINUTES  Visit #: 17    Treatment Area: Unilateral primary osteoarthritis, left knee [M17.12]  Pain in left knee [M25.562]  Other chronic pain [G89.29]    SUBJECTIVE  Pain Level (0-10 scale):  0 \"stiff\"    Any medication changes, allergies to medications, adverse drug reactions, diagnosis change, or new procedure performed?: [x] No    [] Yes (see summary sheet for update)  Subjective functional status/changes:   [] No changes reported  Persistent stiffness. Discouraged knee is not bending more. Is going to touch base with MD      OBJECTIVE    29 min Therapeutic Exercise:  [x] See flow sheet : Includes time spent observing response to exercise and discussing care. Rationale: increase ROM and increase strength to improve the patients ability to walk without limitations. 20 min Manual Therapy: Retrograde Massage and PROM R knee , contract relax    Rationale: decrease pain, increase ROM, increase tissue extensibility and decrease edema  to improve the patients ability to walk without limitations          With   [x] TE   [] TA   [] neuro   [] other: Patient Education: [x] Review HEP  , patient to add weight to prone hang  [] Progressed/Changed HEP based on:   [] positioning   [] body mechanics   [] transfers   [] heat/ice application    [] other:      Other Objective/Functional Measures:   No changes today. See status report to MD for details       Pain Level (0-10 scale) post treatment: 0    ASSESSMENT/Changes in Function:   Patient did well with exercises. Patient able to achieve increased ROM in knee after manual was performed.   However, she is still not at 110 degrees of flexion that her surgeon wanted at this point. Will send update to MD.   She tolerated Patient will continue to benefit from skilled PT services to modify and progress therapeutic interventions, address functional mobility deficits, address ROM deficits, address strength deficits and analyze and cue movement patterns to attain remaining goals. [x]  See Plan of Care  NOTE TO MD next session. [x]  See progress note/recertification  []  See Discharge Summary         Progress towards goals / Updated goals:  Short Term Goals: To be accomplished in 4 weeks:              1. Indp with HEP MET              2. PROM:  0 to 115  NOT MET  Long Term Goals: To be accomplished in 4 weeks:              1.  Gait normal on even surfaces. , progressing               2.  Able to negotiate uneven surfaces              3.  Unilateral stance, > 10 seconds.      PLAN  [x]  Upgrade activities as tolerated     [x]  Continue plan of care    []  Update interventions per flow sheet       []  Discharge due to:_  []  Other:_      April Roc-Bhumi, PT 4/28/2022

## 2022-04-28 NOTE — PROGRESS NOTES
[address], [address]  Phone: [phone]   Fax: [phone]    Progress Note/Status Report. Name: Starr Lundberg   : 1950   MD: Abiola Saunders MD       Treatment Diagnosis: Unilateral primary osteoarthritis, left knee [M17.12]  Pain in left knee [M25.562]  Other chronic pain [G89.29]  Start of Care:  3/18/2022    Visits from Start of Care: 17  Missed Visits: 0    Summary of Care: ROM, gait, strengthening s/p Left TKA 3/17/2022    Assessment / Recommendations:     SUBJECTIVE:  \"stiffness'. Occasional use of NSAID . OBJECTIVE    Gait:  No longer needs assistive device. Mid stance limited due to extension loss. AROM :  Left knee:  90 to -10   PROM:                       95 to -3 , end feel is capsular. Short Term Goals: To be accomplished in 4 weeks:              1. Indp with HEP MET              2. Knee ROM:  0 to 110. Not progressing at this time.                 Long Term Goals: To be accomplished in 4 weeks:              1.  Gait normal on even surfaces. , progressing               2.  Able to negotiate uneven surfaces, progressing               3.  Unilateral stance, > 10 seconds. , progressing. Other: Follow up in surgeon's office for additional guidance.          Candi Toledo, PT 2022

## 2022-04-29 ENCOUNTER — HOSPITAL ENCOUNTER (OUTPATIENT)
Dept: PHYSICAL THERAPY | Age: 72
Discharge: HOME | End: 2022-04-29
Payer: MEDICARE

## 2022-04-29 PROCEDURE — 97140 MANUAL THERAPY 1/> REGIONS: CPT

## 2022-04-29 PROCEDURE — 97110 THERAPEUTIC EXERCISES: CPT

## 2022-04-29 NOTE — PROGRESS NOTES
PT DAILY TREATMENT NOTE - [facility] 2-15    Patient Name: Miranda Gardner  Date:2022  : 1950  [x]  Patient  Verified  Payor: [place] MEDICARE / Plan: [place] MEDICARE PART A & B / Product Type: Medicare /    In time:  1000 am  Out time:  1050 am  Total Treatment Time (min):50 MINUTES  Total Timed Codes (min): 39 MINUTES  1:1 Treatment Time ( only): 39  MINUTES  Visit #: 18    Treatment Area: Unilateral primary osteoarthritis, left knee [M17.12]  Pain in left knee [M25.562]  Other chronic pain [G89.29]    SUBJECTIVE  Pain Level (0-10 scale):  1 \"stiff\"    Any medication changes, allergies to medications, adverse drug reactions, diagnosis change, or new procedure performed?: [x] No    [] Yes (see summary sheet for update)  Subjective functional status/changes:   [] No changes reported  Persistent stiffness. Discouraged knee is not bending more. Is going to touch base with MD. Plans to continue with HEP and stretching. OBJECTIVE    30 min Therapeutic Exercise:  [x] See flow sheet : Includes time spent observing response to exercise and discussing care. Rationale: increase ROM and increase strength to improve the patients ability to walk without limitations. 15 min Manual Therapy: Retrograde Massage and PROM R knee , contract relax    Rationale: decrease pain, increase ROM, increase tissue extensibility and decrease edema  to improve the patients ability to walk without limitations          With   [x] TE   [] TA   [] neuro   [] other: Patient Education: [x] Review HEP  , patient to add weight to prone hang  [] Progressed/Changed HEP based on:   [] positioning   [] body mechanics   [] transfers   [] heat/ice application    [] other:      Other Objective/Functional Measures:      AROM:  90 deg. Flexion   PROM:  98 deg. Flexion with overpressure    Pain Level (0-10 scale) post treatment: 1    ASSESSMENT/Changes in Function:   Patient did well with exercises.  Patient able to achieve increased ROM in knee after manual was performed. However, she is still not at 110 degrees of flexion that her surgeon wanted at this point. Plan to discharge at this time and she will continue with HEP. []  See Plan of Care     []  See progress note/recertification  [x]  See Discharge Summary         Progress towards goals / Updated goals:  Short Term Goals: To be accomplished in 4 weeks:              1. Indp with HEP MET              2. PROM:  0 to 115  NOT MET  Long Term Goals: To be accomplished in 4 weeks:              1.  Gait normal on even surfaces. , progressing               2.  Able to negotiate uneven surfaces progressing              3.  Unilateral stance, > 10 seconds.   progressing    PLAN  []  Upgrade activities as tolerated     []  Continue plan of care    []  Update interventions per flow sheet       [x]  Discharge due to:_progressing towards goals  []  Other:_      Teresa Luevano, PT 4/29/2022

## 2022-05-03 ENCOUNTER — OFFICE VISIT (OUTPATIENT)
Dept: ORTHOPEDIC SURGERY | Age: 72
End: 2022-05-03
Payer: MEDICARE

## 2022-05-03 DIAGNOSIS — Z96.652 STATUS POST LEFT KNEE REPLACEMENT: Primary | ICD-10-CM

## 2022-05-03 PROCEDURE — 99024 POSTOP FOLLOW-UP VISIT: CPT | Performed by: ORTHOPAEDIC SURGERY

## 2022-05-03 RX ORDER — CHLORHEXIDINE GLUCONATE 1.2 MG/ML
RINSE ORAL
COMMUNITY
Start: 2022-03-10

## 2022-05-03 RX ORDER — HYDROMORPHONE HYDROCHLORIDE 2 MG/1
2 TABLET ORAL
Qty: 30 TABLET | Refills: 0 | Status: SHIPPED | OUTPATIENT
Start: 2022-05-03 | End: 2022-05-03 | Stop reason: CLARIF

## 2022-05-03 RX ORDER — HYDROMORPHONE HYDROCHLORIDE 2 MG/1
2 TABLET ORAL
Qty: 30 TABLET | Refills: 0 | Status: SHIPPED | OUTPATIENT
Start: 2022-05-03 | End: 2022-05-17

## 2022-05-03 RX ORDER — ESZOPICLONE 2 MG/1
2 TABLET, FILM COATED ORAL
COMMUNITY
Start: 2022-04-11

## 2022-05-03 NOTE — LETTER
5/4/2022    Patient: Tim Jason   YOB: 1950   Date of Visit: [address], [address]  [address] [address]  Via Fax: [phone]    Dear Dom Crooks MD,      Thank you for referring Ms. Tim Jason to [address] [place] [address] [facility] for evaluation. My notes for this consultation are attached. If you have questions, please do not hesitate to call me. I look forward to following your patient along with you.       Sincerely,    Marielena Bhandari MD

## 2022-05-03 NOTE — PROGRESS NOTES
Name: Nidia Ferraro    : 1950     Service Dept: [address] [place] [address] Sports Medicine    Chief Complaint   Patient presents with    Knee Pain        There were no vitals taken for this visit. Allergies   Allergen Reactions    Cimetidine Hives    Codeine Hives    Hydrocodone Hives    Midazolam Nausea and Vomiting    Octyl 2-Cyanoacrylate Itching and Rash    Oxycodone Hives    Famotidine Rash    Omeprazole Rash and Hives    Sucralose Rash        Current Outpatient Medications   Medication Sig Dispense Refill    chlorhexidine (PERIDEX) 0.12 % solution RINSE MOUTH WITH 15ml FOR 30 sec THEN spit ONCE OR TWICE DAILY      eszopiclone (LUNESTA) 2 mg tablet Take 2 mg by mouth nightly.  HYDROmorphone (Dilaudid) 2 mg tablet Take 1 Tablet by mouth every six (6) hours as needed for Pain for up to 14 days. Max Daily Amount: 8 mg. 30 Tablet 0    meloxicam (MOBIC) 15 mg tablet TAKE ONE TABLET BY MOUTH EVERY DAY as needed for pain      cyclobenzaprine (FLEXERIL) 10 mg tablet Take 10 mg by mouth nightly.  calcium citrate-vitamin d3 (CITRACAL+D) 315-200 mg-unit tab Take 1 Tab by mouth daily (with breakfast).  esomeprazole (NEXIUM) 20 mg capsule Take 20 mg by mouth daily.  methocarbamol (ROBAXIN) 500 mg tablet Take 500 mg by mouth as needed for Muscle Spasm(s).  rosuvastatin (CRESTOR) 10 mg tablet Take 10 mg by mouth nightly.  co-enzyme Q-10 (COQ-10) 100 mg capsule Take 200 mg by mouth daily.  cholecalciferol, vitamin D3, (VITAMIN D3) 50 mcg (2,000 unit) tab Take 1 Tab by mouth daily. Patient Active Problem List   Diagnosis Code    Macromastia N62    Posterior tibial tendon dysfunction, right M76.821    OA (osteoarthritis) of knee M17.10      History reviewed. No pertinent family history.    Social History     Socioeconomic History    Marital status:    Tobacco Use    Smoking status: Never Smoker    Smokeless tobacco: Never Used Substance and Sexual Activity    Alcohol use: Yes     Alcohol/week: 2.0 standard drinks     Types: 2 Glasses of wine per week    Drug use: Never      Past Surgical History:   Procedure Laterality Date    HX BREAST REDUCTION Bilateral 1/29/2020    BILATERAL BREAST REDUCTION performed by Savanah Powers MD at [address] HX CHOLECYSTECTOMY  2002    HX HEENT Bilateral     cataracts    HX ORTHOPAEDIC Left 2017    ORIF    HX MATTIE AND BSO  2004    MT BREAST SURGERY PROCEDURE UNLISTED Right 2001    Biopsy - negative      Past Medical History:   Diagnosis Date    Arthritis     left knee    Cholesterosis     Chronic pain     lower and upper back pain    GERD (gastroesophageal reflux disease)     on medication, does well    Osteopenia 2019    spine    Osteoporosis     of left hip        I have reviewed and agree with [address] and ROS and intake form in chart and the record furthermore I have reviewed prior medical record(s) regarding this patients care during this appointment. Review of Systems:   Patient is a pleasant appearing individual, appropriately dressed, well hydrated, well nourished, who is alert, appropriately oriented for age, and in no acute distress with a normal gait and normal affect who does not appear to be in any significant pain. Physical Exam:  Left knee - Neurovascularly intact with good cap refill, full range of motion and full strength, well healed incision noted, no swelling, no erythema, no instability. Right knee - Decrease range of motion with flexion, Some crepitation, Grossly neurovascularly intact, Good cap refill, No skin lesion, Moderate swelling, No gross instability, Some quadriceps weakness   Encounter Diagnoses     ICD-10-CM ICD-9-CM   1. Status post left knee replacement  Z96.652 V43.65       HPI:  The patient is here status post left total knee replacement. Surgery was on 03/17/2022. Still having a little bit of stiffness.   Lacks about 5 degrees of extension and goes about 95 degrees of flexion. Assessment/Plan:  Plan at this point, we are going to continue with aggressive physical therapy program.  See her back in 4 weeks and we will do Dilaudid and also nighttime extension splint and aggressive physical therapy. If she is not better at that point, we may talk to her about manipulation and we will go from there. As part of continued conservative pain management options the patient was advised to utilize Tylenol or OTC NSAIDS as long as it is not medically contraindicated. Return to Office: Follow-up and Dispositions    · Return in about 4 weeks (around 5/31/2022). Scribed by Keyon Vladez LPN as dictated by [facility] TAWNY Puga MD.  Documentation True and Accepted [facility] TAWNY Puga MD

## 2022-05-03 NOTE — PATIENT INSTRUCTIONS
Knee Pain or Injury: Care Instructions  Your Care Instructions     Injuries are a common cause of knee problems. Sudden (acute) injuries may be caused by a direct blow to the knee. They can also be caused by abnormal twisting, bending, or falling on the knee. Pain, bruising, or swelling may be severe, and may start within minutes of the injury. Overuse is another cause of knee pain. Other causes are climbing stairs, kneeling, and other activities that use the knee. Everyday wear and tear, especially as you get older, also can cause knee pain. Rest, along with home treatment, often relieves pain and allows your knee to heal. If you have a serious knee injury, you may need tests and treatment. Follow-up care is a key part of your treatment and safety. Be sure to make and go to all appointments, and call your doctor if you are having problems. It's also a good idea to know your test results and keep a list of the medicines you take. How can you care for yourself at home? · Be safe with medicines. Read and follow all instructions on the label. ? If the doctor gave you a prescription medicine for pain, take it as prescribed. ? If you are not taking a prescription pain medicine, ask your doctor if you can take an over-the-counter medicine. · Rest and protect your knee. Take a break from any activity that may cause pain. · Put ice or a cold pack on your knee for 10 to 20 minutes at a time. Put a thin cloth between the ice and your skin. · Prop up a sore knee on a pillow when you ice it or anytime you sit or lie down for the next 3 days. Try to keep it above the level of your heart. This will help reduce swelling. · If your knee is not swollen, you can put moist heat, a heating pad, or a warm cloth on your knee. · If your doctor recommends an elastic bandage, sleeve, or other type of support for your knee, wear it as directed.   · Follow your doctor's instructions about how much weight you can put on your leg. Use a cane, crutches, or a walker as instructed. · Follow your doctor's instructions about activity during your healing process. If you can do mild exercise, slowly increase your activity. · Reach and stay at a healthy weight. Extra weight can strain the joints, especially the knees and hips, and make the pain worse. Losing even a few pounds may help. When should you call for help? Call 911 anytime you think you may need emergency care. For example, call if:    · You have symptoms of a blood clot in your lung (called a pulmonary embolism). These may include:  ? Sudden chest pain. ? Trouble breathing. ? Coughing up blood. Call your doctor now or seek immediate medical care if:    · You have severe or increasing pain.     · Your leg or foot turns cold or changes color.     · You cannot stand or put weight on your knee.     · Your knee looks twisted or bent out of shape.     · You cannot move your knee.     · You have signs of infection, such as:  ? Increased pain, swelling, warmth, or redness. ? Red streaks leading from the knee. ? Pus draining from a place on your knee. ? A fever.     · You have signs of a blood clot in your leg (called a deep vein thrombosis), such as:  ? Pain in your calf, back of the knee, thigh, or groin. ? Redness and swelling in your leg or groin. Watch closely for changes in your health, and be sure to contact your doctor if:    · You have tingling, weakness, or numbness in your knee.     · You have any new symptoms, such as swelling.     · You have bruises from a knee injury that last longer than 2 weeks.     · You do not get better as expected. Where can you learn more? Go to http://www.gray.com/  Enter K195 in the search box to learn more about \"Knee Pain or Injury: Care Instructions. \"  Current as of: July 1, 2021               Content Version: 13.2  © 5241-3310 [employer], [employer].    Care instructions adapted under license by [employer] (which disclaims liability or warranty for this information). If you have questions about a medical condition or this instruction, always ask your healthcare professional. [address] any warranty or liability for your use of this information.

## 2022-05-04 ENCOUNTER — HOSPITAL ENCOUNTER (OUTPATIENT)
Dept: PHYSICAL THERAPY | Age: 72
Discharge: HOME | End: 2022-05-04
Payer: MEDICARE

## 2022-05-04 PROCEDURE — 97110 THERAPEUTIC EXERCISES: CPT

## 2022-05-04 PROCEDURE — 97140 MANUAL THERAPY 1/> REGIONS: CPT

## 2022-05-04 NOTE — PROGRESS NOTES
PT DAILY TREATMENT NOTE - [facility] 2-15    Patient Name: Raysa Henson  Date:2022  : 1950  [x]  Patient  Verified  Payor: [place] MEDICARE / Plan: [place] MEDICARE PART A & B / Product Type: Medicare /    In time:  900 am  Out time:  950 am  Total Treatment Time (min):50 MINUTES  Total Timed Codes (min): 37 MINUTES  1:1 Treatment Time ( only): 37  MINUTES  Visit #: 23    Treatment Area: Unilateral primary osteoarthritis, left knee [M17.12]  Pain in left knee [M25.562]  Other chronic pain [G89.29]    SUBJECTIVE  Pain Level (0-10 scale):  0 \"stiff\"    Any medication changes, allergies to medications, adverse drug reactions, diagnosis change, or new procedure performed?: [x] No    [] Yes (see summary sheet for update)  Subjective functional status/changes:   [] No changes reported  Returns with new script from MD for aggressive flexion/extension 3xwk x 6 wks    OBJECTIVE    23 min Therapeutic Exercise:  [x] See flow sheet : Includes time spent observing response to exercise and discussing care. Rationale: increase ROM and increase strength to improve the patients ability to walk without limitations. 20 min Manual Therapy: Retrograde Massage and PROM R knee , contract relax    Rationale: decrease pain, increase ROM, increase tissue extensibility and decrease edema  to improve the patients ability to walk without limitations          With   [x] TE   [] TA   [] neuro   [] other: Patient Education: [x] Review HEP  , patient to add weight to prone hang  [] Progressed/Changed HEP based on:   [] positioning   [] body mechanics   [] transfers   [] heat/ice application    [] other:      Other Objective/Functional Measures:      AROM:  93 deg. Flexion   PROM:  100 deg. Flexion with overpressure    Pain Level (0-10 scale) post treatment: 0    ASSESSMENT/Changes in Function:   Patient did well with exercises. Patient able to achieve increased ROM in knee after manual therapy was performed.   Will continue PT to progress towards full extension/flexion. Patient will continue to benefit from skilled PT services to modify and progress therapeutic interventions, address functional mobility deficits, address ROM deficits, address strength deficits and analyze and cue movement patterns to attain remaining goals. [x]  See Plan of Care     []  See progress note/recertification  []  See Discharge Summary         Progress towards goals / Updated goals:  Short Term Goals: To be accomplished in 4 weeks:              1. Indp with HEP MET              2. PROM:  0 to 115 progressing  Long Term Goals: To be accomplished in 4 weeks:              1.  Gait normal on even surfaces. , progressing               2.  Able to negotiate uneven surfaces progressing              3.  Unilateral stance, > 10 seconds.   progressing    PLAN  []  Upgrade activities as tolerated     [x]  Continue plan of care    []  Update interventions per flow sheet       []  Discharge due to:_  []  Other:_      Halima Madden, PT 5/4/2022

## 2022-05-06 ENCOUNTER — HOSPITAL ENCOUNTER (OUTPATIENT)
Dept: PHYSICAL THERAPY | Age: 72
Discharge: HOME | End: 2022-05-06
Payer: MEDICARE

## 2022-05-06 PROCEDURE — 97140 MANUAL THERAPY 1/> REGIONS: CPT | Performed by: PHYSICAL THERAPIST

## 2022-05-06 PROCEDURE — 97110 THERAPEUTIC EXERCISES: CPT | Performed by: PHYSICAL THERAPIST

## 2022-05-06 NOTE — PROGRESS NOTES
PT DAILY TREATMENT NOTE - [facility] 2-15    Patient Name: Francisco Tidwell  Date:2022  : 1950  [x]  Patient  Verified  Payor: VA MEDICARE / Plan: VA MEDICARE PART A & B / Product Type: Medicare /    In time:  100 am Out time:  210 am  Total Treatment Time (min):50 MINUTES  Total Timed Codes (min): 37 MINUTES  1:1 Treatment Time ( only): 37  MINUTES  Visit #: 23    Treatment Area: Unilateral primary osteoarthritis, left knee [M17.12]  Pain in left knee [M25.562]  Other chronic pain [G89.29]    SUBJECTIVE  Pain Level (0-10 scale):  0 \"stiff\"    Any medication changes, allergies to medications, adverse drug reactions, diagnosis change, or new procedure performed?: [x] No    [] Yes (see summary sheet for update)  Subjective functional status/changes:   [] No changes reported  Trying to follow all the MD orders. Extension brace has been ordered. OBJECTIVE    25 min Therapeutic Exercise:  [x] See flow sheet : Includes time spent observing response to exercise and discussing care. Rationale: increase ROM and increase strength to improve the patients ability to walk without limitations. 20 min Manual Therapy: Retrograde Massage and PROM R knee , contract relax    Rationale: decrease pain, increase ROM, increase tissue extensibility and decrease edema  to improve the patients ability to walk without limitations          With   [x] TE   [] TA   [] neuro   [] other: Patient Education: [x] Review HEP  , patient to add weight to prone hang  [] Progressed/Changed HEP based on:   [] positioning   [] body mechanics   [] transfers   [] heat/ice application    [] other:      Other Objective/Functional Measures:      AROM:  100  deg. Flexion   PROM:  105  deg. Flexion with overpressure  (measured in sitting)     Pain Level (0-10 scale) post treatment: 0    ASSESSMENT/Changes in Function:   Patient did well with exercises. 5 degree gain in passive flexion s/p session.   Patient will continue to benefit from skilled PT services to modify and progress therapeutic interventions, address functional mobility deficits, address ROM deficits, address strength deficits and analyze and cue movement patterns to attain remaining goals. [x]  See Plan of Care     []  See progress note/recertification  []  See Discharge Summary         Progress towards goals / Updated goals:  Short Term Goals: To be accomplished in 4 weeks:              1. Indp with HEP MET              2. PROM:  0 to 115 progressing  Long Term Goals: To be accomplished in 4 weeks:              1.  Gait normal on even surfaces. , progressing               2.  Able to negotiate uneven surfaces progressing              3.  Unilateral stance, > 10 seconds.   progressing    PLAN  []  Upgrade activities as tolerated     [x]  Continue plan of care    []  Update interventions per flow sheet       []  Discharge due to:_  []  Other:_      April Roc-Bhumi, PT 5/6/2022

## 2022-05-10 ENCOUNTER — HOSPITAL ENCOUNTER (OUTPATIENT)
Dept: PHYSICAL THERAPY | Age: 72
Discharge: HOME | End: 2022-05-10
Payer: MEDICARE

## 2022-05-10 PROCEDURE — 97110 THERAPEUTIC EXERCISES: CPT | Performed by: PHYSICAL THERAPIST

## 2022-05-10 PROCEDURE — 97140 MANUAL THERAPY 1/> REGIONS: CPT | Performed by: PHYSICAL THERAPIST

## 2022-05-10 NOTE — PROGRESS NOTES
PT DAILY TREATMENT NOTE - [facility] 2-15    Patient Name: Nidia Ferraro  Date:5/10/2022  : 1950  [x]  Patient  Verified  Payor: Chelle Clark / Plan: VA MEDICARE PART A & B / Product Type: Medicare /    In time:  930 am Out time:  1030 am  Total Treatment Time (min):60MINUTES  Total Timed Codes (min): 52 MINUTES  1:1 Treatment Time ( only): 52  MINUTES  Visit #: 21    Treatment Area: Unilateral primary osteoarthritis, left knee [M17.12]  Pain in left knee [M25.562]  Other chronic pain [G89.29]    SUBJECTIVE  Pain Level (0-10 scale):  0 \"stiff\"    Any medication changes, allergies to medications, adverse drug reactions, diagnosis change, or new procedure performed?: [x] No    [] Yes (see summary sheet for update)  Subjective functional status/changes:   [] No changes reported  Started wearing extension brace for about 60 minutes yesterday. It is a pain in the butt. OBJECTIVE    27 min Therapeutic Exercise:  [x] See flow sheet : Includes time spent observing response to exercise and discussing care. Rationale: increase ROM and increase strength to improve the patients ability to walk without limitations. 20 min Manual Therapy: Retrograde Massage and PROM R knee , contract relax    Rationale: decrease pain, increase ROM, increase tissue extensibility and decrease edema  to improve the patients ability to walk without limitations          With   [x] TE   [] TA   [] neuro   [] other: Patient Education: [x] Review HEP  , patient to add weight to prone hang  [] Progressed/Changed HEP based on:   [] positioning   [] body mechanics   [] transfers   [] heat/ice application    [] other:      Other Objective/Functional Measures:      AROM:  100  deg. Flexion   PROM:  105  deg. Flexion with overpressure  (measured in sitting)                              -5 extension. Gait is persistently limited in mid stance extension.        Pain Level (0-10 scale) post treatment: 0    ASSESSMENT/Changes in Function: Patient did well with exercises. 5 degree gain in passive flexion s/p session. Patient will continue to benefit from skilled PT services to modify and progress therapeutic interventions, address functional mobility deficits, address ROM deficits, address strength deficits and analyze and cue movement patterns to attain remaining goals. [x]  See Plan of Care     []  See progress note/recertification  []  See Discharge Summary         Progress towards goals / Updated goals:  Short Term Goals: To be accomplished in 4 weeks:              1. Indp with HEP MET              2. PROM:  0 to 115 progressing  Long Term Goals: To be accomplished in 4 weeks:              1.  Gait normal on even surfaces. , progressing               2.  Able to negotiate uneven surfaces progressing              3.  Unilateral stance, > 10 seconds.   progressing    PLAN  []  Upgrade activities as tolerated     [x]  Continue plan of care    []  Update interventions per flow sheet       []  Discharge due to:_  []  Other:_      April Roc-Bhumi, PT 5/10/2022

## 2022-05-12 ENCOUNTER — HOSPITAL ENCOUNTER (OUTPATIENT)
Dept: PHYSICAL THERAPY | Age: 72
Discharge: HOME | End: 2022-05-12
Payer: MEDICARE

## 2022-05-12 PROCEDURE — 97112 NEUROMUSCULAR REEDUCATION: CPT

## 2022-05-12 PROCEDURE — 97124 MASSAGE THERAPY: CPT

## 2022-05-12 PROCEDURE — 97110 THERAPEUTIC EXERCISES: CPT

## 2022-05-12 NOTE — PROGRESS NOTES
PT DAILY TREATMENT NOTE - [facility] 2-15    Patient Name: Charbel Castillo  Date:2022  : 1950  [x]  Patient  Verified  Payor: [place] MEDICARE / Plan: [place] MEDICARE PART A & B / Product Type: Medicare /    In time: 10:30 am Out time: 11:18am  Total Treatment Time (min):47MINUTES  Total Timed Codes (min): 52 MINUTES  1:1 Treatment Time ( only): 52  MINUTES  Visit #: 22    Treatment Area: Unilateral primary osteoarthritis, left knee [M17.12]  Pain in left knee [M25.562]  Other chronic pain [G89.29]    SUBJECTIVE  Pain Level (0-10 scale):  1 \"stiff\"    Any medication changes, allergies to medications, adverse drug reactions, diagnosis change, or new procedure performed?: [x] No    [] Yes (see summary sheet for update)  Subjective functional status/changes:   [] No changes reported  Pt reported that she is periodically wearing her splint/brace but finds it inconvenient to wear it consistently. OBJECTIVE    27 min Therapeutic Exercise:  [x] See flow sheet : Includes time spent observing response to exercise and discussing care. Rationale: increase ROM and increase strength to improve the patients ability to walk without limitations. 10 min Neuromuscular Re-education:  []? See flow sheet :   Rationale: improve coordination, improve balance and increase proprioception  to improve the patients ability to descend stairs reciprocally without pain or discomfort    10 min Manual Therapy: PROM R knee , contract relax    Rationale: decrease pain, increase ROM, increase tissue extensibility and decrease edema  to improve the patients ability to walk without limitations          With   [x] TE   [] TA   [] neuro   [] other: Patient Education: [] Review HEP  , patient to add weight to prone hang  [] Progressed/Changed HEP based on:   [] positioning   [] body mechanics   [] transfers   [] heat/ice application    [] other:      Other Objective/Functional Measures:      AROM:  100  deg. Flexion   PROM:  105  deg.  Flexion with overpressure  (measured in sitting)                              -5 extension. Gait is persistently limited in mid stance extension. Pain Level (0-10 scale) post treatment: 1    ASSESSMENT/Changes in Function:   Patient did well with exercises. Pt was progressed with SLS on foam to facilitate balance and proprioception. Side stepping with squats activity was also given to pt without any LOB and improved trunk and quad control. Patient will continue to benefit from skilled PT services to modify and progress therapeutic interventions, address functional mobility deficits, address ROM deficits, address strength deficits and analyze and cue movement patterns to attain remaining goals. [x]  See Plan of Care     []  See progress note/recertification  []  See Discharge Summary         Progress towards goals / Updated goals:  Short Term Goals: To be accomplished in 4 weeks:              1. Indp with HEP MET              2. PROM:  0 to 115 progressing  Long Term Goals: To be accomplished in 4 weeks:              1.  Gait normal on even surfaces. , progressing               2.  Able to negotiate uneven surfaces progressing              3.  Unilateral stance, > 10 seconds.   progressing    PLAN  [x]  Upgrade activities as tolerated     [x]  Continue plan of care    []  Update interventions per flow sheet       []  Discharge due to:_  []  Other:_      Queta Hollins, PTA 5/12/2022

## 2022-05-13 ENCOUNTER — HOSPITAL ENCOUNTER (OUTPATIENT)
Dept: PHYSICAL THERAPY | Age: 72
Discharge: HOME | End: 2022-05-13
Payer: MEDICARE

## 2022-05-13 PROCEDURE — 97110 THERAPEUTIC EXERCISES: CPT

## 2022-05-13 PROCEDURE — 97140 MANUAL THERAPY 1/> REGIONS: CPT

## 2022-05-13 NOTE — PROGRESS NOTES
PT DAILY TREATMENT NOTE - [facility] 2-15    Patient Name: Yadi Guevara  Date:2022  : 1950  [x]  Patient  Verified  Payor: [place] MEDICARE / Plan: [place] MEDICARE PART A & B / Product Type: Medicare /    In time: 1100 Out time: 1145  Total Treatment Time (min):45MINUTES  Total Timed Codes (min): 40 MINUTES  1:1 Treatment Time ( only): 36  MINUTES  Visit #: 21    Treatment Area: Unilateral primary osteoarthritis, left knee [M17.12]  Pain in left knee [M25.562]  Other chronic pain [G89.29]    SUBJECTIVE  Pain Level (0-10 scale):  1 \"stiff\"    Any medication changes, allergies to medications, adverse drug reactions, diagnosis change, or new procedure performed?: [x] No    [] Yes (see summary sheet for update)  Subjective functional status/changes:   [] No changes reported  Pt reported that she is periodically wearing her splint/brace but finds it inconvenient to wear it consistently. OBJECTIVE    30 min Therapeutic Exercise:  [x] See flow sheet : Includes time spent observing response to exercise and discussing care. Rationale: increase ROM and increase strength to improve the patients ability to walk without limitations. 0 min Neuromuscular Re-education:  []? See flow sheet :   Rationale: improve coordination, improve balance and increase proprioception  to improve the patients ability to descend stairs reciprocally without pain or discomfort    10 min Manual Therapy: PROM R knee , contract relax    Rationale: decrease pain, increase ROM, increase tissue extensibility and decrease edema  to improve the patients ability to walk without limitations          With   [x] TE   [] TA   [] neuro   [] other: Patient Education: [] Review HEP  , patient to add weight to prone hang  [] Progressed/Changed HEP based on:   [] positioning   [] body mechanics   [] transfers   [] heat/ice application    [] other:      Other Objective/Functional Measures:      AROM:  100  deg. Flexion   PROM:  100  deg.  Flexion with overpressure  (measured in prone)                             -5 extension. Gait is persistently limited in mid stance extension. Pain Level (0-10 scale) post treatment: 1    ASSESSMENT/Changes in Function:   Patient did well with exercises. Pt was progressed with SLS on foam to facilitate balance and proprioception. Side stepping with squats activity was also given to pt without any LOB and improved trunk and quad control. Patient will continue to benefit from skilled PT services to modify and progress therapeutic interventions, address functional mobility deficits, address ROM deficits, address strength deficits and analyze and cue movement patterns to attain remaining goals. [x]  See Plan of Care     []  See progress note/recertification  []  See Discharge Summary         Progress towards goals / Updated goals:  Short Term Goals: To be accomplished in 4 weeks:              1. Indp with HEP MET              2. PROM:  0 to 115 progressing  Long Term Goals: To be accomplished in 4 weeks:              1.  Gait normal on even surfaces. , progressing               2.  Able to negotiate uneven surfaces progressing              3.  Unilateral stance, > 10 seconds.   progressing    PLAN  [x]  Upgrade activities as tolerated     [x]  Continue plan of care    []  Update interventions per flow sheet       []  Discharge due to:_  []  Other:_      Ambrocio Richmond, PT 5/13/2022

## 2022-05-16 ENCOUNTER — HOSPITAL ENCOUNTER (OUTPATIENT)
Dept: PHYSICAL THERAPY | Age: 72
Discharge: HOME | End: 2022-05-16
Payer: MEDICARE

## 2022-05-16 PROCEDURE — 97110 THERAPEUTIC EXERCISES: CPT | Performed by: PHYSICAL THERAPIST

## 2022-05-16 PROCEDURE — 97140 MANUAL THERAPY 1/> REGIONS: CPT | Performed by: PHYSICAL THERAPIST

## 2022-05-16 NOTE — PROGRESS NOTES
PT DAILY TREATMENT NOTE - [facility] 2-15    Patient Name: Jihan Sanchez  Date:2022  : 1950  [x]  Patient  Verified  Payor: [place] MEDICARE / Plan: [place] MEDICARE PART A & B / Product Type: Medicare /    In time: 1100 Out time: 1140  Total Treatment Time (min):40 MINUTES  Total Timed Codes (min): 40 MINUTES  1:1 Treatment Time ( only): 36  MINUTES  Visit #: 24    Treatment Area: Unilateral primary osteoarthritis, left knee [M17.12]  Pain in left knee [M25.562]  Other chronic pain [G89.29]    SUBJECTIVE  Pain Level (0-10 scale): 0 \"stiff\"    Any medication changes, allergies to medications, adverse drug reactions, diagnosis change, or new procedure performed?: [x] No    [] Yes (see summary sheet for update)  Subjective functional status/changes:   [] No changes reported  Only wearing extension brace 1 hour daily . Working 1-2 days per week. OBJECTIVE    25 min Therapeutic Exercise:  [x] See flow sheet : Includes time spent observing response to exercise and discussing care. Rationale: increase ROM and increase strength to improve the patients ability to walk without limitations. 0 min Neuromuscular Re-education:  []? See flow sheet :   Rationale: improve coordination, improve balance and increase proprioception  to improve the patients ability to descend stairs reciprocally without pain or discomfort    15  min Manual Therapy: PROM R knee , contract relax    Rationale: decrease pain, increase ROM, increase tissue extensibility and decrease edema  to improve the patients ability to walk without limitations          With   [x] TE   [] TA   [] neuro   [] other: Patient Education: [] Review HEP  , patient to add weight to prone hang  [] Progressed/Changed HEP based on:   [] positioning   [] body mechanics   [] transfers   [] heat/ice application    [] other:      Other Objective/Functional Measures:      AROM:  105  deg. Flexion   PROM:  110  deg.  Flexion with overpressure  (measured in sitting ) -5 extension. Gait is persistently limited in mid stance extension. Pain Level (0-10 scale) post treatment: 1    ASSESSMENT/Changes in Function:   Patient did well with exercises. Pt was progressed with SLS on foam to facilitate balance and proprioception. Side stepping with squats activity was also given to pt without any LOB and improved trunk and quad control. Patient will continue to benefit from skilled PT services to modify and progress therapeutic interventions, address functional mobility deficits, address ROM deficits, address strength deficits and analyze and cue movement patterns to attain remaining goals. [x]  See Plan of Care     []  See progress note/recertification  []  See Discharge Summary         Progress towards goals / Updated goals:  Short Term Goals: To be accomplished in 4 weeks:              1. Indp with HEP MET              2. PROM:  0 to 115 progressing  Long Term Goals: To be accomplished in 4 weeks:              1.  Gait normal on even surfaces. , progressing               2.  Able to negotiate uneven surfaces progressing              3.  Unilateral stance, > 10 seconds.   progressing    PLAN  [x]  Upgrade activities as tolerated     [x]  Continue plan of care    []  Update interventions per flow sheet       []  Discharge due to:_  []  Other:_      April Roc-Bhumi, PT 5/16/2022

## 2022-05-18 ENCOUNTER — HOSPITAL ENCOUNTER (OUTPATIENT)
Dept: PHYSICAL THERAPY | Age: 72
Discharge: HOME | End: 2022-05-18
Payer: MEDICARE

## 2022-05-18 PROCEDURE — 97110 THERAPEUTIC EXERCISES: CPT | Performed by: PHYSICAL THERAPIST

## 2022-05-18 PROCEDURE — 97140 MANUAL THERAPY 1/> REGIONS: CPT | Performed by: PHYSICAL THERAPIST

## 2022-05-18 NOTE — PROGRESS NOTES
PT DAILY TREATMENT NOTE - [facility] 2-15    Patient Name: Homero Fothergill  Date:2022  : 1950  [x]  Patient  Verified  Payor: Jess Hood / Plan: [place] MEDICARE PART A & B / Product Type: Medicare /    In time: 1030 am  Out time: 1125 am   Total Treatment Time (min): 55  MINUTES  Total Timed Codes (min): 45 MINUTES  1:1 Treatment Time ( only): 45  MINUTES  Visit #: 25    Treatment Area: Unilateral primary osteoarthritis, left knee [M17.12]  Pain in left knee [M25.562]  Other chronic pain [G89.29]    SUBJECTIVE  Pain Level (0-10 scale): 1 \"stiff\"    Any medication changes, allergies to medications, adverse drug reactions, diagnosis change, or new procedure performed?: [x] No    [] Yes (see summary sheet for update)  Subjective functional status/changes:   [] No changes reported  My bend is really doing great. Going back to [place] 2022    OBJECTIVE    28 min Therapeutic Exercise:  [x] See flow sheet : Includes time spent observing response to exercise and discussing care. Rationale: increase ROM and increase strength to improve the patients ability to walk without limitations. 0 min Neuromuscular Re-education:  []? See flow sheet :   Rationale: improve coordination, improve balance and increase proprioception  to improve the patients ability to descend stairs reciprocally without pain or discomfort    10  min Manual Therapy: PROM R knee , contract relax    Rationale: decrease pain, increase ROM, increase tissue extensibility and decrease edema  to improve the patients ability to walk without limitations          With   [x] TE   [] TA   [] neuro   [] other: Patient Education: [] Review HEP  , patient to add weight to prone hang  [] Progressed/Changed HEP based on:   [] positioning   [] body mechanics   [] transfers   [] heat/ice application    [] other:      Other Objective/Functional Measures:      AROM:  105  deg. Flexion   PROM:  110  deg.  Flexion with overpressure  (measured in sitting ) -5 extension. Gait is persistently limited in mid stance extension. Pain Level (0-10 scale) post treatment: 1    ASSESSMENT/Changes in Function:   Patient did well with exercises. Pt was progressed with SLS on foam to facilitate balance and proprioception. Side stepping with squats activity was also given to pt without any LOB and improved trunk and quad control. Patient will continue to benefit from skilled PT services to modify and progress therapeutic interventions, address functional mobility deficits, address ROM deficits, address strength deficits and analyze and cue movement patterns to attain remaining goals. [x]  See Plan of Care     []  See progress note/recertification  []  See Discharge Summary         Progress towards goals / Updated goals:  Short Term Goals: To be accomplished in 4 weeks:              1. Indp with HEP MET              2. PROM:  0 to 115 progressing  Long Term Goals: To be accomplished in 4 weeks:              1.  Gait normal on even surfaces. , progressing               2.  Able to negotiate uneven surfaces progressing              3.  Unilateral stance, > 10 seconds.   progressing    PLAN  [x]  Upgrade activities as tolerated     [x]  Continue plan of care    []  Update interventions per flow sheet       []  Discharge due to:_  []  Other:_      April Roc-Bhumi, PT 5/18/2022

## 2022-05-19 ENCOUNTER — HOSPITAL ENCOUNTER (OUTPATIENT)
Dept: PHYSICAL THERAPY | Age: 72
Discharge: HOME | End: 2022-05-19
Payer: MEDICARE

## 2022-05-19 PROCEDURE — 97110 THERAPEUTIC EXERCISES: CPT | Performed by: PHYSICAL THERAPIST

## 2022-05-19 PROCEDURE — 97140 MANUAL THERAPY 1/> REGIONS: CPT | Performed by: PHYSICAL THERAPIST

## 2022-05-19 NOTE — PROGRESS NOTES
PT DAILY TREATMENT NOTE - [facility] 2-15    Patient Name: Erik Morgan  Date:2022  : 1950  [x]  Patient  Verified  Payor: [place] MEDICARE / Plan: [place] MEDICARE PART A & B / Product Type: Medicare /    In time: 2616 am Out time: 1200pm   Total Treatment Time (min): 75  MINUTES  Total Timed Codes (min): 39 MINUTES  1:1 Treatment Time ( only): 39 MINUTES  Visit #: 32    Treatment Area: Unilateral primary osteoarthritis, left knee [M17.12]  Pain in left knee [M25.562]  Other chronic pain [G89.29]    SUBJECTIVE  Pain Level (0-10 scale): 1 \"stiff\"    Any medication changes, allergies to medications, adverse drug reactions, diagnosis change, or new procedure performed?: [x] No    [] Yes (see summary sheet for update)  Subjective functional status/changes:   [] No changes reported    Slightly sore after the session yesterday. OBJECTIVE    26 min Therapeutic Exercise:  [x] See flow sheet : Includes time spent observing response to exercise and discussing care. Rationale: increase ROM and increase strength to improve the patients ability to walk without limitations. 0 min Neuromuscular Re-education:  []? See flow sheet :   Rationale: improve coordination, improve balance and increase proprioception  to improve the patients ability to descend stairs reciprocally without pain or discomfort    15  min Manual Therapy: PROM R knee , contract relax    Rationale: decrease pain, increase ROM, increase tissue extensibility and decrease edema  to improve the patients ability to walk without limitations          With   [x] TE   [] TA   [] neuro   [] other: Patient Education: [] Review HEP  , patient to add weight to prone hang  [] Progressed/Changed HEP based on:   [] positioning   [] body mechanics   [] transfers   [] heat/ice application    [] other:      Other Objective/Functional Measures:      AROM:  105  deg. Flexion   PROM:  110  deg.  Flexion with overpressure  (measured in sitting ) -5 extension. Gait is persistently limited in mid stance extension. Pain Level (0-10 scale) post treatment: 1    ASSESSMENT/Changes in Function:     Patient will continue to benefit from skilled PT services to modify and progress therapeutic interventions, address functional mobility deficits, address ROM deficits, address strength deficits and analyze and cue movement patterns to attain remaining goals. [x]  See Plan of Care     []  See progress note/recertification  []  See Discharge Summary         Progress towards goals / Updated goals:  Short Term Goals: To be accomplished in 4 weeks:              1. Indp with HEP MET              2. PROM:  0 to 115 progressing  Long Term Goals: To be accomplished in 4 weeks:              1.  Gait normal on even surfaces. , progressing               2.  Able to negotiate uneven surfaces progressing              3.  Unilateral stance, > 10 seconds.   progressing    PLAN  [x]  Upgrade activities as tolerated     [x]  Continue plan of care    []  Update interventions per flow sheet       []  Discharge due to:_  []  Other:_      April Roc-Bhumi, PT 5/19/2022

## 2022-05-31 ENCOUNTER — HOSPITAL ENCOUNTER (OUTPATIENT)
Dept: PHYSICAL THERAPY | Age: 72
Discharge: HOME | End: 2022-05-31
Payer: MEDICARE

## 2022-05-31 PROCEDURE — 97110 THERAPEUTIC EXERCISES: CPT

## 2022-05-31 PROCEDURE — 97140 MANUAL THERAPY 1/> REGIONS: CPT

## 2022-05-31 NOTE — PROGRESS NOTES
PT DAILY TREATMENT NOTE - [facility] 2-15    Patient Name: Servando Moreira  Date:2022  : 1950  [x]  Patient  Verified  Payor: [place] MEDICARE / Plan: [place] MEDICARE PART A & B / Product Type: Medicare /    In time: 10:34 am Out time: 11:21am   Total Treatment Time (min): 47  MINUTES  Total Timed Codes (min): 52 MINUTES  1:1 Treatment Time ( only): 52 MINUTES  Visit #: 32    Treatment Area: Unilateral primary osteoarthritis, left knee [M17.12]  Pain in left knee [M25.562]  Other chronic pain [G89.29]    SUBJECTIVE  Pain Level (0-10 scale): 1 \"stiff\"    Any medication changes, allergies to medications, adverse drug reactions, diagnosis change, or new procedure performed?: [x] No    [] Yes (see summary sheet for update)  Subjective functional status/changes:   [] No changes reported  Returned from [facility] last Thursday however did not move around much since then. Lot of walking at [facility] though. OBJECTIVE    32 min Therapeutic Exercise:  [x] See flow sheet : Includes time spent observing response to exercise and discussing care. Rationale: increase ROM and increase strength to improve the patients ability to walk without limitations. 0 min Neuromuscular Re-education:  []? See flow sheet :   Rationale: improve coordination, improve balance and increase proprioception  to improve the patients ability to descend stairs reciprocally without pain or discomfort    15  min Manual Therapy: PROM R knee , Jt Post Ant mobs grade 4    Rationale: decrease pain, increase ROM, increase tissue extensibility and decrease edema  to improve the patients ability to walk without limitations          With   [x] TE   [] TA   [] neuro   [] other: Patient Education: [] Review HEP  , patient to add weight to prone hang  [] Progressed/Changed HEP based on:   [] positioning   [] body mechanics   [] transfers   [] heat/ice application    [] other:      Other Objective/Functional Measures:      AROM:  105  deg.  Flexion   PROM: 112 deg. Flexion with overpressure  (measured in sitting )              AROM:  -5 extension. PROM: -3 Extension     Gait is persistently limited in mid stance extension. Pain Level (0-10 scale) post treatment: 1    ASSESSMENT/Changes in Function:   Pt demonstrated ability to tolerate flexion and extension progression with added weight without increased pain. Pt needed VC to correct hip alignment when ascending stairs. Pt was able to gain flexion ROM post jt mobs with decreased pain post treatment. Patient will continue to benefit from skilled PT services to modify and progress therapeutic interventions, address functional mobility deficits, address ROM deficits, address strength deficits and analyze and cue movement patterns to attain remaining goals. [x]  See Plan of Care     []  See progress note/recertification  []  See Discharge Summary         Progress towards goals / Updated goals:  Short Term Goals: To be accomplished in 4 weeks:              1. Indp with HEP MET              2. PROM:  0 to 115 progressing  Long Term Goals: To be accomplished in 4 weeks:              1.  Gait normal on even surfaces. , progressing               2.  Able to negotiate uneven surfaces progressing              3.  Unilateral stance, > 10 seconds.   progressing    PLAN  [x]  Upgrade activities as tolerated     [x]  Continue plan of care    []  Update interventions per flow sheet       []  Discharge due to:_  []  Other:_      Hayden Constanza, THEO 5/31/2022

## 2022-06-03 ENCOUNTER — HOSPITAL ENCOUNTER (OUTPATIENT)
Dept: PHYSICAL THERAPY | Age: 72
Discharge: HOME | End: 2022-06-03
Payer: MEDICARE

## 2022-06-03 PROCEDURE — 97140 MANUAL THERAPY 1/> REGIONS: CPT | Performed by: PHYSICAL THERAPIST

## 2022-06-03 PROCEDURE — 97110 THERAPEUTIC EXERCISES: CPT | Performed by: PHYSICAL THERAPIST

## 2022-06-03 NOTE — PROGRESS NOTES
PT DAILY TREATMENT NOTE - [facility] 2-15    Patient Name: Erica Freeze  Date:6/3/2022  : 1950  [x]  Patient  Verified  Payor: [place] MEDICARE / Plan: VA MEDICARE PART A & B / Product Type: Medicare /    In time: 10:00 am Out time: 11:15am   Total Treatment Time (min): 75  MINUTES  Total Timed Codes (min): 46 MINUTES  1:1 Treatment Time ( only): 46 MINUTES  Visit #: 32    Treatment Area: Unilateral primary osteoarthritis, left knee [M17.12]  Pain in left knee [M25.562]  Other chronic pain [G89.29]    SUBJECTIVE  Pain Level (0-10 scale): 1 \"stiff\"    Any medication changes, allergies to medications, adverse drug reactions, diagnosis change, or new procedure performed?: [x] No    [] Yes (see summary sheet for update)  Subjective functional status/changes:   [] No changes reported  Returned from [facility] last Thursday however did not move around much since then. Lot of walking at [facility] though. OBJECTIVE    27 min Therapeutic Exercise:  [x] See flow sheet : Includes time spent observing response to exercise and discussing care. Rationale: increase ROM and increase strength to improve the patients ability to walk without limitations. 0 min Neuromuscular Re-education:  []? See flow sheet :   Rationale: improve coordination, improve balance and increase proprioception  to improve the patients ability to descend stairs reciprocally without pain or discomfort    15  min Manual Therapy: PROM R knee , Jt Post Ant mobs grade 4    Rationale: decrease pain, increase ROM, increase tissue extensibility and decrease edema  to improve the patients ability to walk without limitations          With   [x] TE   [] TA   [] neuro   [] other: Patient Education: [] Review HEP  , patient to add weight to prone hang  [] Progressed/Changed HEP based on:   [] positioning   [] body mechanics   [] transfers   [] heat/ice application    [] other:      Other Objective/Functional Measures:      AROM:  105  deg.  Flexion   PROM:  115 deg. Flexion with overpressure  (measured in sitting )              AROM:  -5 extension. PROM: - 2  Extension     Gait has a mild extension deficit with mid stance. Pain Level (0-10 scale) post treatment: 1    ASSESSMENT/Changes in Function:   Pt demonstrated ability to tolerate flexion and extension progression with added weight without increased pain. Pt needed VC to correct hip alignment when ascending stairs. Pt was able to gain flexion ROM post jt mobs with decreased pain post treatment. Patient will continue to benefit from skilled PT services to modify and progress therapeutic interventions, address functional mobility deficits, address ROM deficits, address strength deficits and analyze and cue movement patterns to attain remaining goals. [x]  See Plan of Care   . Note to MD next visit  []  See progress note/recertification  []  See Discharge Summary         Progress towards goals / Updated goals:  Short Term Goals: To be accomplished in 4 weeks:              1. Indp with HEP MET              2. PROM:  0 to 115 progressing  Long Term Goals: To be accomplished in 4 weeks:              1.  Gait normal on even surfaces. , progressing               2.  Able to negotiate uneven surfaces progressing              3.  Unilateral stance, > 10 seconds.   progressing    PLAN  [x]  Upgrade activities as tolerated     [x]  Continue plan of care    []  Update interventions per flow sheet       []  Discharge due to:_  []  Other:_      April Roc-Bhumi, PT 6/3/2022

## 2022-06-06 ENCOUNTER — HOSPITAL ENCOUNTER (OUTPATIENT)
Dept: PHYSICAL THERAPY | Age: 72
Discharge: HOME | End: 2022-06-06
Payer: MEDICARE

## 2022-06-06 PROCEDURE — 97530 THERAPEUTIC ACTIVITIES: CPT | Performed by: PHYSICAL THERAPIST

## 2022-06-06 PROCEDURE — 97110 THERAPEUTIC EXERCISES: CPT | Performed by: PHYSICAL THERAPIST

## 2022-06-06 NOTE — PROGRESS NOTES
[address], [address]  [address] [facility]  Phone: [phone]   Fax: [phone]    Progress Note    Name: Cecilio Serrano   : 1950   MD: Janae Melo MD       Treatment Diagnosis: Unilateral primary osteoarthritis, left knee [M17.12]  Pain in left knee [M25.562]  Other chronic pain [G89.29]  Start of Care:  3/18/2022    Visits from Start of Care: 28  Missed Visits: 0    Summary of Care: Knee rehab s/p TKA, right 3/17/2022    Assessment / Recommendations:     Subjective:  VAS < 2/10. Moderate  difficulty with sleep. Gait:  Mild decrease in right stance but significant improvement    AROM:  -5 to 105  PROM:   -3 to 110 with overpressure. End feel is soft capsular. Balance: WNL     IMPRESSION:    Good progress over the last 4 week. Patient should be OK for discharge with continuation of HEP. Please advise and thank you for this referral.  [facility]              1. Indp with HEP MET              2. PROM:  0 to 115 , PROGRESSING   Long Term Goals: To be accomplished in 4 weeks:              1.  Gait normal on even surfaces , MET              2.  Able to negotiate uneven surfaces, progressing               3.  Unilateral stance, > 5 seconds, unsteady.           Other: At your discretion.       Continue PT _________________________________________________    Discontinue PT _______________________________________________        April Lovering-Page, PT 2022

## 2022-06-06 NOTE — PROGRESS NOTES
PT DAILY TREATMENT NOTE - [facility] 2-15    Patient Name: Gee Pagan  Date:2022  : 1950  [x]  Patient  Verified  Payor: Krupa Castañeda / Plan: [place] MED[place] PART A & B / Product Type: Medicare /    In time: 145 pm Out time: 245 pm    Total Treatment Time (min): 60 MINUTES  Total Timed Codes (min): 40 MINUTES  1:1 Treatment Time ( only): 40 MINUTES  Visit #: 32    Treatment Area: Unilateral primary osteoarthritis, left knee [M17.12]  Pain in left knee [M25.562]  Other chronic pain [G89.29]    SUBJECTIVE  Pain Level (0-10 scale): 1 \"stiff\"    Any medication changes, allergies to medications, adverse drug reactions, diagnosis change, or new procedure performed?: [x] No    [] Yes(see summary sheet for update)  Subjective functional status/changes:   [] No changes reported  Persistent stiffness. Going back to the MD tomorrow , so I need a note. OBJECTIVE    29 min Therapeutic Exercise:  [x] See flow sheet : Includes time spent observing response to exercise and discussing care. Rationale: increase ROM and increase strength to improve the patients ability to walk without limitations. 0 min Neuromuscular Re-education:  []? See flow sheet :   Rationale: improve coordination, improve balance and increase proprioception  to improve the patients ability to descend stairs reciprocally without pain or discomfort    15  min Manual Therapy: PROM R knee , Jt Post Ant mobs grade 4    Rationale: decrease pain, increase ROM, increase tissue extensibility and decrease edema  to improve the patients ability to walk without limitations          With   [x] TE   [] TA   [] neuro   [] other: Patient Education: [] Review HEP  Discussed options for home program.   [] Progressed/Changed HEP based on:   [] positioning   [] body mechanics   [] transfers   [] heat/ice application    [] other:      Other Objective/Functional Measures:      AROM:  105  deg. Flexion   PROM:  115 deg.  Flexion with overpressure  (measured in sitting )              AROM:  -5 extension. PROM: - 2  Extension     Gait has a mild extension deficit with mid stance. See progress note for MD for details. Pain Level (0-10 scale) post treatment: 1    ASSESSMENT/Changes in Function:   Anticipate discharge to [facility]     Patient will continue to benefit from skilled PT services to modify and progress therapeutic interventions, address functional mobility deficits, address ROM deficits, address strength deficits and analyze and cue movement patterns to attain remaining goals. [x]  See Plan of Care   . Note to MD next visit  []  See progress note/recertification  []  See Discharge Summary         Progress towards goals / Updated goals:  Short Term Goals: To be accomplished in 4 weeks:              1. Indp with HEP MET              2. PROM:  0 to 115 progressing  Long Term Goals: To be accomplished in 4 weeks:              1.  Gait normal on even surfaces. , progressing               2.  Able to negotiate uneven surfaces progressing              3.  Unilateral stance, > 5 seconds.       PLAN  [x]  Upgrade activities as tolerated     [x]  Continue plan of care    []  Update interventions per flow sheet       []  Discharge due to:_  []  Other:_      April Roc-Bhumi, PT 6/6/2022

## 2022-06-07 ENCOUNTER — OFFICE VISIT (OUTPATIENT)
Dept: ORTHOPEDIC SURGERY | Age: 72
End: 2022-06-07
Payer: MEDICARE

## 2022-06-07 DIAGNOSIS — Z96.652 STATUS POST LEFT KNEE REPLACEMENT: Primary | ICD-10-CM

## 2022-06-07 PROCEDURE — 99024 POSTOP FOLLOW-UP VISIT: CPT | Performed by: ORTHOPAEDIC SURGERY

## 2022-06-07 NOTE — PATIENT INSTRUCTIONS
Knee Pain or Injury: Care Instructions  Your Care Instructions     Injuries are a common cause of knee problems. Sudden (acute) injuries may be caused by a direct blow to the knee. They can also be caused by abnormal twisting, bending, or falling on the knee. Pain, bruising, or swelling may be severe, and may start within minutes of the injury. Overuse is another cause of knee pain. Other causes are climbing stairs, kneeling, and other activities that use the knee. Everyday wear and tear, especially as you get older, also can cause knee pain. Rest, along with home treatment, often relieves pain and allows your knee to heal. If you have a serious knee injury, you may need tests and treatment. Follow-up care is a key part of your treatment and safety. Be sure to make and go to all appointments, and call your doctor if you are having problems. It's also a good idea to know your test results and keep a list of the medicines you take. How can you care for yourself at home? · Be safe with medicines. Read and follow all instructions on the label. ? If the doctor gave you a prescription medicine for pain, take it as prescribed. ? If you are not taking a prescription pain medicine, ask your doctor if you can take an over-the-counter medicine. · Rest and protect your knee. Take a break from any activity that may cause pain. · Put ice or a cold pack on your knee for 10 to 20 minutes at a time. Put a thin cloth between the ice and your skin. · Prop up a sore knee on a pillow when you ice it or anytime you sit or lie down for the next 3 days. Try to keep it above the level of your heart. This will help reduce swelling. · If your knee is not swollen, you can put moist heat, a heating pad, or a warm cloth on your knee. · If your doctor recommends an elastic bandage, sleeve, or other type of support for your knee, wear it as directed.   · Follow your doctor's instructions about how much weight you can put on your leg. Use a cane, crutches, or a walker as instructed. · Follow your doctor's instructions about activity during your healing process. If you can do mild exercise, slowly increase your activity. · Reach and stay at a healthy weight. Extra weight can strain the joints, especially the knees and hips, and make the pain worse. Losing even a few pounds may help. When should you call for help? Call 911 anytime you think you may need emergency care. For example, call if:    · You have symptoms of a blood clot in your lung (called a pulmonary embolism). These may include:  ? Sudden chest pain. ? Trouble breathing. ? Coughing up blood. Call your doctor now or seek immediate medical care if:    · You have severe or increasing pain.     · Your leg or foot turns cold or changes color.     · You cannot stand or put weight on your knee.     · Your knee looks twisted or bent out of shape.     · You cannot move your knee.     · You have signs of infection, such as:  ? Increased pain, swelling, warmth, or redness. ? Red streaks leading from the knee. ? Pus draining from a place on your knee. ? A fever.     · You have signs of a blood clot in your leg (called a deep vein thrombosis), such as:  ? Pain in your calf, back of the knee, thigh, or groin. ? Redness and swelling in your leg or groin. Watch closely for changes in your health, and be sure to contact your doctor if:    · You have tingling, weakness, or numbness in your knee.     · You have any new symptoms, such as swelling.     · You have bruises from a knee injury that last longer than 2 weeks.     · You do not get better as expected. Where can you learn more? Go to http://www.gray.com/  Enter K195 in the search box to learn more about \"Knee Pain or Injury: Care Instructions. \"  Current as of: July 1, 2021               Content Version: 13.2  © 0791-9379 [employer], [employer].    Care instructions adapted under license by [employer] (which disclaims liability or warranty for this information). If you have questions about a medical condition or this instruction, always ask your healthcare professional. [address] any warranty or liability for your use of this information.

## 2022-06-07 NOTE — PROGRESS NOTES
Name: Naomi Blum    : 1950     Service Dept: [address] [place] [address] and Sports Medicine    Chief Complaint   Patient presents with    Knee Pain        There were no vitals taken for this visit. Allergies   Allergen Reactions    Cimetidine Hives    Codeine Hives    Hydrocodone Hives    Midazolam Nausea and Vomiting    Octyl 2-Cyanoacrylate Itching and Rash    Oxycodone Hives    Famotidine Rash    Omeprazole Rash and Hives    Sucralose Rash        Current Outpatient Medications   Medication Sig Dispense Refill    chlorhexidine (PERIDEX) 0.12 % solution RINSE MOUTH WITH 15ml FOR 30 sec THEN spit ONCE OR TWICE DAILY      eszopiclone (LUNESTA) 2 mg tablet Take 2 mg by mouth nightly.  meloxicam (MOBIC) 15 mg tablet TAKE ONE TABLET BY MOUTH EVERY DAY as needed for pain      cyclobenzaprine (FLEXERIL) 10 mg tablet Take 10 mg by mouth nightly.  calcium citrate-vitamin d3 (CITRACAL+D) 315-200 mg-unit tab Take 1 Tab by mouth daily (with breakfast).  esomeprazole (NEXIUM) 20 mg capsule Take 20 mg by mouth daily.  methocarbamol (ROBAXIN) 500 mg tablet Take 500 mg by mouth as needed for Muscle Spasm(s).  rosuvastatin (CRESTOR) 10 mg tablet Take 10 mg by mouth nightly.  co-enzyme Q-10 (COQ-10) 100 mg capsule Take 200 mg by mouth daily.  cholecalciferol, vitamin D3, (VITAMIN D3) 50 mcg (2,000 unit) tab Take 1 Tab by mouth daily. Patient Active Problem List   Diagnosis Code    Macromastia N62    Posterior tibial tendon dysfunction, right M76.821    OA (osteoarthritis) of knee M17.10      History reviewed. No pertinent family history. Social History     Socioeconomic History    Marital status:    Tobacco Use    Smoking status: Never Smoker    Smokeless tobacco: Never Used   Substance and Sexual Activity    Alcohol use:  Yes     Alcohol/week: 2.0 standard drinks     Types: 2 Glasses of wine per week    Drug use: Never      Past Surgical History:   Procedure Laterality Date    HX BREAST REDUCTION Bilateral 1/29/2020    BILATERAL BREAST REDUCTION performed by Lewis Puri MD at [address] HX CHOLECYSTECTOMY  2002    HX HEENT Bilateral     cataracts    HX ORTHOPAEDIC Left 2017    ORIF    HX MATTIE AND BSO  2004    UT BREAST SURGERY PROCEDURE UNLISTED Right 2001    Biopsy - negative      Past Medical History:   Diagnosis Date    Arthritis     left knee    Cholesterosis     Chronic pain     lower and upper back pain    GERD (gastroesophageal reflux disease)     on medication, does well    Osteopenia 2019    spine    Osteoporosis     of left hip        I have reviewed and agree with [address] and ROS and intake form in chart and the record furthermore I have reviewed prior medical record(s) regarding this patients care during this appointment. Review of Systems:   Patient is a pleasant appearing individual, appropriately dressed, well hydrated, well nourished, who is alert, appropriately oriented for age, and in no acute distress with a normal gait and normal affect who does not appear to be in any significant pain. Physical Exam:  Left knee - Neurovascularly intact with good cap refill, full range of motion and full strength, well healed incision noted, no swelling, no erythema, no instability. Right knee - Decrease range of motion with flexion, Some crepitation, Grossly neurovascularly intact, Good cap refill, No skin lesion, Moderate swelling, No gross instability, Some quadriceps weakness   Encounter Diagnoses     ICD-10-CM ICD-9-CM   1. Status post left knee replacement  Z96.652 V43.65       HPI:  The patient is here with a chief complaint of left knee pain, status post left total knee replacement, doing significantly better and incision is clean, dry and intact. Assessment/Plan:  Plan at this point, activities as tolerated started, weightbearing started, no restrictions.   We will see the patient back as needed and go from there.    As part of continued conservative pain management options the patient was advised to utilize Tylenol or OTC NSAIDS as long as it is not medically contraindicated. Return to Office: Follow-up and Dispositions    · Return if symptoms worsen or fail to improve. Scribed by Alonso Zarate LPN as dictated by [facility] TAWNY Galvan MD.  Documentation True and Accepted [facility] TAWNY Galvan MD

## 2022-06-07 NOTE — LETTER
6/9/2022    Patient: Servando Moreira   YOB: 1950   Date of Visit: 6/7/2022     Shaka Kim, [address]  [address] [address]  Via Fax: [phone]    Dear Shaka Kim MD,      Thank you for referring Ms. Servando Moreira to [address] [place] [address] AND SPORTS [facility] for evaluation. My notes for this consultation are attached. If you have questions, please do not hesitate to call me. I look forward to following your patient along with you.       Sincerely,    Savannah Silva MD

## 2022-08-16 NOTE — PROGRESS NOTES
[address], [address] [facility]  Phone: [phone]   Fax: [phone]    Discharge Summary 2-15    Patient name: David Pardo  : 1950  Provider#: [id number]  Referral source: Francisco Wadsworth MD      Medical/Treatment Diagnosis: Unilateral primary osteoarthritis, left knee [M17.12]  Pain in left knee [M25.562]  Other chronic pain [G89.29]     Prior Hospitalization: see medical history     Comorbidities: See Plan of Care  Prior Level of Function: See Plan of Care  Medications: Verified on Patient Summary List    Start of Care: 3/18/2022      Onset Date:3/17/2022   Visits from Start of Care: 29     Missed Visits: 2022  Reporting Period : 3/18/2022 to 2022    Assessment/Summary of care:  AFTER CARE S/P RIGHT TKA 3/17/2022    FOTO Functional Measure: <20 %/100  Discharge  42.23/100  Intake      Short Term Goals: To be accomplished in 4 weeks:              1. Indp with HEP MET              2. PROM:  0 to 115 MET 22  Long Term Goals: To be accomplished in 4 weeks:              1.  Gait normal on even surfaces. , PARTIALLY ACHIEVED, 2022              2.  Able to negotiate uneven surfaces progressing, PARTIALLY ACHIEVED , 2022              3.  Unilateral stance, > 5 seconds, PARTIALLY ACHIEVED, 2022        RECOMMENDATIONS:  [x]Discontinue therapy: []Patient has reached or is progressing toward set goals     []Patient is non-compliant or has abdicated     []Due to lack of appreciable progress towards set goals     [x]Other  SEE LAST PROGRESS NOTE TO MD FOR DETAILS.      Candi Brian-Bhumi, PT 2022

## 2022-11-22 ENCOUNTER — HOSPITAL ENCOUNTER (OUTPATIENT)
Dept: PHYSICAL THERAPY | Age: 72
Discharge: HOME | End: 2022-11-22
Payer: MEDICARE

## 2022-11-22 PROCEDURE — 97110 THERAPEUTIC EXERCISES: CPT | Performed by: PHYSICAL THERAPIST

## 2022-11-22 PROCEDURE — 97161 PT EVAL LOW COMPLEX 20 MIN: CPT | Performed by: PHYSICAL THERAPIST

## 2022-11-22 NOTE — THERAPY EVALUATION
[address] . [address] [place] [address], [address] [facility]  Phone: [phone]   Fax: [phone]    PT INITIAL EVALUATION NOTE - [facility] 2-15  Plan of Care/Statement of Necessity for Physical Therapy Services  2-15    Patient Name: Mary Lou Buchanan  Date:2022  : 1950  [x]  Patient  Verified  Provider#: [id number]  Payor: [place] MEDICARE / Plan: [place] MEDICARE PART A & B / Product Type: Medicare /    Referral source: Tee Love MD   In time:1005 AM   Out time:  1100 AM   Total Treatment Time (min): 55 minutes  Total Timed Codes (min): 10   1:1 Treatment Time ( [address] only): 10   Visit #: 1      Start of Care: 22      Onset Date: 6 months ago after lifting. Treatment Area/Diagnosis: Other low back pain [M54.59]    SUBJECTIVE  Pain Level (0-10 scale): 5                Best: 0:  relief-Mobic PRN/flexoril , heating pad     Worst:  0 aggravating:  going up and down steps, sitting, carrying lifting, bending over. Description: achy, strained, tired. Any medication changes, allergies to medications, adverse drug reactions, diagnosis change, or new procedure performed?: [] No    [x] Yes (see summary sheet for update)    Subjective:    Chief complaint of central low back , chronic in nature but has increased in the intensity. Denies numbness or tingling. Sleep is not disturbed. She is interested in learning home exercises and strategies to prevent problem. PLOF: avoid lifting heavy objects. Mechanism of Injury: chronic. Previous Treatment/Compliance: medication, heating pad. 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 Pt Goals: learn stretches/exercise.    Barriers: none  Motivation: good  Substance use: none  Cognition: A & O x 4 OBJECTIVE/EXAMINATION    Ortho:   Observation: WD, WN female. Posture:  slight increase in lumbar lordosis. Functional Mobility:        Gait:  mild decrease left stance. Stairs: unable to descend with a reciprocal pattern . Definite use of handrail       Balance:  < 5 seconds, unsteady  Palpation:  negative. Spine:   TRUNK ROM:     Flexion:                         [] N/A  []WNL  []Minimal  [x]Moderate  [] Major   Extension:                     [] N/A  []WNL  [x]Minimal  []Moderate  [] Major , felt good. Right Lateral Flexion:    [] N/A  []WNL  [x]Minimal  []Moderate  [] Major    Left Lateral Flexion:   [] N/A  []WNL  [x]Minimal  []Moderate  [] Major   Additional comments: PA glides, decrease throughout lumbar spine. LE AROM:    Hip AROM:  Right      [] N/A  []WNL  []Minimal  [x]Moderate  [] Major , IR             Left         [] N/A  [x]WNL  []Minimal  []Moderate  [] Major     Strength:  RLE: WNL        LLE:  WNL           Neurological: Reflexes / Sensations: WNL  Special Tests: slump/SLR:  negative b/l    [x]  10 min Therapeutic Exercise:   See flow sheet :  HEP   Rationale: increase ROM and increase posture/ effect of lumbar pain   to improve the patients ability to participate in routine ADL without increasing symptoms. With   [x] TE   [] TA   [] neuro   [] other: Patient Education: [] Review HEP    [] Progressed/Changed HEP based on:   [x] positioning   [x] body mechanics   [] transfers   [] heat/ice application    [] other:   use of lumbar support when sitting, avoidance of bending/twisting lifting. Other Objective/Functional Measures:   TUG: WNL      Pain Level (0-10 scale) post treatment: 0    ASSESSMENT/Key Information/Changes in Function:   72 yo female with chief complaint of chronic , episodic low back pain. Objective findings are indicative of lumbar degenerative changes.   Will benefit from conservative PT intervention to help patient manage symptoms. Problem List: pain affecting function, decrease ROM, impaired gait/ balance, decrease activity tolerance, and decrease flexibility/ joint mobility    Short Term Goals: To be accomplished in 4 weeks:   1. Inpd with HEP               2.  Pain intensity to decrease to < 2/10 most days of the month. Long Term Goals: To be accomplished in 8 weeks:   1. Am-Pac score to improve by DEANDRA. 2.  Carry 20# grocery bag up 5 steps without increasing symptoms. Patient Goal (s): learn a HEP, what to avoid.     Evaluation Complexity History MEDIUM  Complexity : 1-2 comorbidities / personal factors will impact the outcome/ POC ; Examination LOW Complexity : 1-2 Standardized tests and measures addressing body structure, function, activity limitation and / or participation in recreation  ;Presentation LOW Complexity : Stable, uncomplicated  ;Clinical Decision Making Other outcome measures [facility] 32.79%  MEDIUM  Overall Complexity Rating: LOW      Patient / Family readiness to learn indicated by: asking questions and interest  Persons(s) to be included in education: patient (P)  Barriers to Learning/Limitations: None  Patient Self Reported Health Status: good  Rehabilitation Potential: good  Patient/ Caregiver education and instruction: activity modification and exercises      PLAN:  Treatment Plan may include any combination of the following: Therapeutic exercise, Neuromuscular reeducation, Manual therapy, and Therapeutic activity  HEP Issued: see above     Frequency / Duration: Patient to be seen 1 times per week for 4 weeks. [x]  Plan of care has been reviewed with PTA    Certification Period: 11/22/22  to  02/22/2023    Candi Toledo, PT 11/22/2022     ________________________________________________________________________    I certify that the above Therapy Services are being furnished while the patient is under my care.  I agree with the treatment plan and certify that this therapy is necessary.     Physician's Signature:____________________  Date:____________Time: _________            Corinna Paredes MD

## 2022-12-02 ENCOUNTER — HOSPITAL ENCOUNTER (OUTPATIENT)
Dept: PHYSICAL THERAPY | Age: 72
Discharge: HOME | End: 2022-12-02
Payer: MEDICARE

## 2022-12-02 PROCEDURE — 97110 THERAPEUTIC EXERCISES: CPT | Performed by: PHYSICAL THERAPIST

## 2022-12-02 NOTE — PROGRESS NOTES
PT DAILY TREATMENT NOTE - [facility] -15    Patient Name: Carmina Law  Date:2022  : 1950  [x]  Patient  Verified  Payor: Ruy Wili / Plan[place] / Product Type: Medicare /    In time:930 am Out time:1030 am   Total Treatment Time (min): 60  Total Timed Codes (min): 40  1:1 Treatment Time ( [address] only): 40   Visit #:  2    Treatment Area: Other low back pain [M54.59]    SUBJECTIVE  Pain Level (0-10 scale): 2, mild low back pain   Any medication changes, allergies to medications, adverse drug reactions, diagnosis change, or new procedure performed?: [x] No    [] Yes (see summary sheet for update)  Subjective functional status/changes:   [] No changes reported    Patient reports she want to get HEP. Does not feel she needs routine, regular PT visits. OBJECTIVE    40 min Therapeutic Exercise:  [x] See flow sheet :     Rationale: increase ROM and increase strength to improve the patients ability to move without increasing pain           With   [x] TE   [] TA   [] neuro   [] other: Patient Education: [x] Review HEP  Short Term Goals: To be accomplished in 4 weeks:              1.  Inpd with HEP               2.  Pain intensity to decrease to < 2/10 most days of the month. Long Term Goals: To be accomplished in 8 weeks:              1.  Am-Pac score to improve by DEANDRA. 2.  Carry 20# grocery bag up 5 steps without increasing symptoms. Patient Goal (s): learn a HEP, what to avoid.   [] Progressed/Changed HEP based on:   [] positioning   [] body mechanics   [] transfers   [] heat/ice application    [] other:      Other Objective/Functional Measures:      Pain Level (0-10 scale) post treatment: 2    ASSESSMENT/Changes in Function:   Good return demonstration of today's program.  No adverse reactions. Practiced golfer's  and discussing heavier lifts off the floor.    Patient will continue to benefit from skilled PT services to modify and progress therapeutic interventions, address functional mobility deficits, address ROM deficits, address strength deficits, and analyze and modify body mechanics/ergonomics to attain remaining goals. [x]  See Plan of Care  []  See progress note/re certification              Progress towards goals / Updated goals:   Short Term Goals: To be accomplished in 4 weeks:              1.  Inpd with HEP               2.  Pain intensity to decrease to < 2/10 most days of the month. Long Term Goals: To be accomplished in 8 weeks:              1.  Am-Pac score to improve by DEANDRA. 2.  Carry 20# grocery bag up 5 steps without increasing symptoms. Patient Goal (s): learn a HEP, what to avoid.         PLAN  [x]  Upgrade activities as tolerated     [x]  Continue plan of care  []  Update interventions per flow sheet       []  Discharge due to:_  []  Other:_      April Roc-Bhumi, PT 12/2/2022

## 2022-12-06 ENCOUNTER — HOSPITAL ENCOUNTER (OUTPATIENT)
Dept: PHYSICAL THERAPY | Age: 72
Discharge: HOME | End: 2022-12-06
Payer: MEDICARE

## 2022-12-06 PROCEDURE — 97140 MANUAL THERAPY 1/> REGIONS: CPT | Performed by: PHYSICAL THERAPIST

## 2022-12-06 PROCEDURE — 97110 THERAPEUTIC EXERCISES: CPT | Performed by: PHYSICAL THERAPIST

## 2022-12-06 NOTE — PROGRESS NOTES
[address], [address] [facility] [address] [facility]  Phone: [phone]   Fax: [phone]      DISCHARGE SUMMARY. Name: Chantelle Velez   : 1950   MD: Stephanie Carson MD       Treatment Diagnosis: Other low back pain [M54.59]  Start of Care: 10/22/2022    Visits from Start of Care: 3  Missed Visits: 0    Summary of Care:  HEP, STM/joint mobilization for chronic/episodic low back pain     AM-PAC:  32.79% PRE AND POST. Assessment / Recommendations:    Short Term Goals: To be accomplished in 4 weeks:              1.  Inpd with HEP , MET 2022              2.  Pain intensity to decrease to < 2/10 most days of the month, PROGRESSING  Long Term Goals: To be accomplished in 8 weeks:              1.  Am-Pac score to improve by DEANDRA.  , NOT MET              2.  Carry 20# grocery bag up 5 steps without increasing symptoms. MET 2022  Patient Goal (s): learn a HEP, what to avoid. MET 2022    Discontinue therapy. Progressing towards or have reached established goals. PER PATIENT'S REQUEST.        Candi Brian-Bhumi, PT 2022

## 2022-12-06 NOTE — PROGRESS NOTES
PT DAILY TREATMENT NOTE - [facility] 2-15    Patient Name: Alana Timmons  Date:2022  : 1950  [x]  Patient  Verified  Payor: Claudia Marino / Plan: [place] & [place] / Product Type: Medicare /    In time: 845 am Out time:1 am   Total Treatment Time (min): 60  Total Timed Codes (min): 40  1:1 Treatment Time ( [address] only): 40   Visit #:  3    Treatment Area: Other low back pain [M54.59]    SUBJECTIVE  Pain Level (0-10 scale): 2, mild low back pain   Any medication changes, allergies to medications, adverse drug reactions, diagnosis change, or new procedure performed?: [x] No    [] Yes (see summary sheet for update)  Subjective functional status/changes:   [] No changes reported    Patient reports she want to get HEP. Does not feel she needs routine, regular PT visits. OBJECTIVE    25 min Therapeutic Exercise:  [x] See flow sheet :     Rationale: increase ROM and increase strength to improve the patients ability to move without increasing pain     15 min Manual Therapy: SMTMARTA TL spine . Rationale: decrease pain, increase ROM, and increase tissue extensibility to improve the patients ability to move with nominal pain. With   [x] TE   [] TA   [] neuro   [] other: Patient Education: [x] Review HEP  Short Term Goals: To be accomplished in 4 weeks:              1.  Inpd with HEP               2.  Pain intensity to decrease to < 2/10 most days of the month. Long Term Goals: To be accomplished in 8 weeks:              1.  Am-Pac score to improve by DEANDRA. 2.  Carry 20# grocery bag up 5 steps without increasing symptoms. Patient Goal (s): learn a HEP, what to avoid.    [] Progressed/Changed HEP based on:   [] positioning   [] body mechanics   [] transfers   [] heat/ice application    [] other:      Other Objective/Functional Measures:      Pain Level (0-10 scale) post treatment: 2    ASSESSMENT/Changes in Function:   Good return demonstration of today's program.  No adverse reactions. Practiced golfer's  and discussing heavier lifts off the floor. Patient will continue to benefit from skilled PT services to modify and progress therapeutic interventions, address functional mobility deficits, address ROM deficits, address strength deficits, and analyze and modify body mechanics/ergonomics to attain remaining goals. [x]  See Plan of Care  []  See progress note/re certification              Progress towards goals / Updated goals:   Short Term Goals: To be accomplished in 4 weeks:              1.  Inpd with HEP , MET 12/6/2022              2.  Pain intensity to decrease to < 2/10 most days of the month, PROGRESSING  Long Term Goals: To be accomplished in 8 weeks:              1.  Am-Pac score to improve by DEANDRA.  , NOT MET              2.  Carry 20# grocery bag up 5 steps without increasing symptoms. MET 12/6/2022  Patient Goal (s): learn a HEP, what to avoid.  MET 12/6/2022       PLAN  [x]  Upgrade activities as tolerated     [x]  Continue plan of care  []  Update interventions per flow sheet       []  Discharge due to:_  []  Other:_      April Tre, PT 12/6/2022

## 2023-02-24 ENCOUNTER — TRANSCRIBE ORDER (OUTPATIENT)
Dept: SCHEDULING | Age: 73
End: 2023-02-24

## 2023-02-24 DIAGNOSIS — Z12.31 SCREENING MAMMOGRAM FOR HIGH-RISK PATIENT: Primary | ICD-10-CM

## 2023-02-24 DIAGNOSIS — M81.0 OSTEOPOROSIS: Primary | ICD-10-CM

## 2023-03-14 ENCOUNTER — HOSPITAL ENCOUNTER (OUTPATIENT)
Dept: MAMMOGRAPHY | Age: 73
Discharge: HOME | End: 2023-03-14
Payer: MEDICARE

## 2023-03-14 DIAGNOSIS — M81.0 OSTEOPOROSIS: ICD-10-CM

## 2023-03-14 PROCEDURE — 77080 DXA BONE DENSITY AXIAL: CPT

## 2023-03-14 RX ORDER — HEPARIN SODIUM 1000 [USP'U]/ML
INJECTION, SOLUTION INTRAVENOUS; SUBCUTANEOUS
Status: DISPENSED
Start: 2023-03-14 | End: 2023-03-14

## 2023-04-22 ENCOUNTER — TRANSCRIBE ORDERS (OUTPATIENT)
Facility: HOSPITAL | Age: 73
End: 2023-04-22

## 2023-04-22 DIAGNOSIS — Z12.31 SCREENING MAMMOGRAM FOR HIGH-RISK PATIENT: Primary | ICD-10-CM

## 2023-04-24 DIAGNOSIS — Z12.31 SCREENING MAMMOGRAM FOR HIGH-RISK PATIENT: Primary | ICD-10-CM

## 2023-06-27 ENCOUNTER — HOSPITAL ENCOUNTER (OUTPATIENT)
Facility: HOSPITAL | Age: 73
Discharge: HOME | End: 2023-06-30
Payer: MEDICARE

## 2023-06-27 DIAGNOSIS — Z12.31 SCREENING MAMMOGRAM FOR HIGH-RISK PATIENT: ICD-10-CM

## 2023-06-27 PROCEDURE — 77063 BREAST TOMOSYNTHESIS BI: CPT

## 2023-06-29 ENCOUNTER — OFFICE VISIT (OUTPATIENT)
Age: 73
End: 2023-06-29

## 2023-06-29 DIAGNOSIS — M17.11 OSTEOARTHRITIS OF RIGHT KNEE, UNSPECIFIED OSTEOARTHRITIS TYPE: ICD-10-CM

## 2023-06-29 DIAGNOSIS — Z01.818 PRE-OP TESTING: ICD-10-CM

## 2023-06-29 DIAGNOSIS — M25.561 RIGHT KNEE PAIN, UNSPECIFIED CHRONICITY: Primary | ICD-10-CM

## 2023-11-03 DIAGNOSIS — Z01.818 PRE-OP TESTING: Primary | ICD-10-CM

## 2023-11-15 ENCOUNTER — HOSPITAL ENCOUNTER (OUTPATIENT)
Facility: HOSPITAL | Age: 73
Discharge: HOME | End: 2023-11-18
Payer: MEDICARE

## 2023-11-15 ENCOUNTER — HOSPITAL ENCOUNTER (OUTPATIENT)
Facility: HOSPITAL | Age: 73
Discharge: HOME | End: 2023-11-17
Payer: MEDICARE

## 2023-11-15 DIAGNOSIS — Z01.818 PRE-OP TESTING: ICD-10-CM

## 2023-11-15 LAB
ANION GAP SERPL CALC-SCNC: 4 MMOL/L (ref 5–15)
BUN SERPL-MCNC: 12 MG/DL (ref 6–20)
BUN/CREAT SERPL: 17 (ref 12–20)
CA-I BLD-MCNC: 9 MG/DL (ref 8.5–10.1)
CHLORIDE SERPL-SCNC: 107 MMOL/L (ref 97–108)
CO2 SERPL-SCNC: 28 MMOL/L (ref 21–32)
CREAT SERPL-MCNC: 0.71 MG/DL (ref 0.55–1.02)
EKG ATRIAL RATE: 64 BPM
EKG DIAGNOSIS: NORMAL
EKG P AXIS: 47 DEGREES
EKG P-R INTERVAL: 142 MS
EKG Q-T INTERVAL: 410 MS
EKG QRS DURATION: 88 MS
EKG QTC CALCULATION (BAZETT): 422 MS
EKG R AXIS: 20 DEGREES
EKG T AXIS: 24 DEGREES
EKG VENTRICULAR RATE: 64 BPM
ERYTHROCYTE [DISTWIDTH] IN BLOOD BY AUTOMATED COUNT: 12.1 % (ref 11.5–14.5)
GLUCOSE SERPL-MCNC: 111 MG/DL (ref 65–100)
HCT VFR BLD AUTO: 41.5 % (ref 35–47)
HGB BLD-MCNC: 13.8 G/DL (ref 11.5–16)
MCH RBC QN AUTO: 30.1 PG (ref 26–34)
MCHC RBC AUTO-ENTMCNC: 33.3 G/DL (ref 30–36.5)
MCV RBC AUTO: 90.6 FL (ref 80–99)
MRSA DNA SPEC QL NAA+PROBE: NOT DETECTED
NRBC # BLD: 0 K/UL (ref 0–0.01)
NRBC BLD-RTO: 0 PER 100 WBC
PLATELET # BLD AUTO: 257 K/UL (ref 150–400)
PMV BLD AUTO: 9.7 FL (ref 8.9–12.9)
POTASSIUM SERPL-SCNC: 4.3 MMOL/L (ref 3.5–5.1)
RBC # BLD AUTO: 4.58 M/UL (ref 3.8–5.2)
SODIUM SERPL-SCNC: 139 MMOL/L (ref 136–145)
WBC # BLD AUTO: 8.2 K/UL (ref 3.6–11)

## 2023-11-15 PROCEDURE — 80048 BASIC METABOLIC PNL TOTAL CA: CPT

## 2023-11-15 PROCEDURE — 36415 COLL VENOUS BLD VENIPUNCTURE: CPT

## 2023-11-15 PROCEDURE — 87641 MR-STAPH DNA AMP PROBE: CPT

## 2023-11-15 PROCEDURE — 93005 ELECTROCARDIOGRAM TRACING: CPT

## 2023-11-15 PROCEDURE — 85027 COMPLETE CBC AUTOMATED: CPT

## 2023-11-15 PROCEDURE — 71046 X-RAY EXAM CHEST 2 VIEWS: CPT

## 2023-11-16 DIAGNOSIS — M25.561 RIGHT KNEE PAIN, UNSPECIFIED CHRONICITY: ICD-10-CM

## 2023-11-16 DIAGNOSIS — M17.11 OSTEOARTHRITIS OF RIGHT KNEE, UNSPECIFIED OSTEOARTHRITIS TYPE: ICD-10-CM

## 2023-11-16 DIAGNOSIS — Z01.818 PRE-OP TESTING: ICD-10-CM

## 2023-12-07 DIAGNOSIS — Z96.651 STATUS POST TOTAL RIGHT KNEE REPLACEMENT: Primary | ICD-10-CM

## 2023-12-14 ENCOUNTER — TELEPHONE (OUTPATIENT)
Age: 73
End: 2023-12-14

## 2023-12-14 NOTE — TELEPHONE ENCOUNTER
Patient was in for preop today and needed an updated pharmacy that would have her prescription in stock.     Please send patients Keflex, Dilaudid, Aspirin and Zofran to updated pharmacy per patients request:    [facility] [place], [place] [facility] [address]

## 2024-01-04 ENCOUNTER — TELEPHONE (OUTPATIENT)
Age: 74
End: 2024-01-04

## 2024-01-04 ENCOUNTER — HOSPITAL ENCOUNTER (OUTPATIENT)
Facility: HOSPITAL | Age: 74
Setting detail: RECURRING SERIES
Discharge: HOME | End: 2024-01-07
Attending: ORTHOPAEDIC SURGERY
Payer: MEDICARE

## 2024-01-04 PROCEDURE — 97110 THERAPEUTIC EXERCISES: CPT

## 2024-01-04 PROCEDURE — 97162 PT EVAL MOD COMPLEX 30 MIN: CPT

## 2024-01-04 PROCEDURE — 97140 MANUAL THERAPY 1/> REGIONS: CPT

## 2024-01-04 NOTE — THERAPY EVALUATION
[facility]  [address], [address]  [place] [address]  Phone: [phone]    Fax: [phone]         PHYSICAL THERAPY - MEDICARE EVALUATION/PLAN OF CARE NOTE (updated 3/23)      Date: 2024          Patient Name:  Clarissa Alonzo :  1950   Medical   Diagnosis:  Status post total right knee replacement [Z96.651] Treatment Diagnosis:  M25.561  RIGHT KNEE PAIN    Referral Source:  Talib Rebollar MD Provider #:  [id number]                Insurance: Payor: MEDICARE / Plan: MEDICARE PART A AND B / Product Type: *No Product type* /      Patient  verified yes     Visit #   Current  / Total 1 18   Time   In / Out 245 335   Total Treatment Time 50   Total Timed Codes 23   1:1 Treatment Time 23      [facility] Totals Reminder:  bill using total billable   min of TIMED therapeutic procedures and modalities.   8-22 min = 1 unit; 23-37 min = 2 units; 38-52 min = 3 units;  53-67 min = 4 units; 68-82 min = 5 units           SUBJECTIVE  Pain Level (0-10 scale): 6  [x]constant []intermittent []improving []worsening []no change since onset    Any medication changes, allergies to medications, adverse drug reactions, diagnosis change, or new procedure performed?: [x] No    [] Yes (see summary sheet for update)  Medications: Verified on Patient Summary List    Subjective functional status/changes:     Patient had R TKR on 1/3/24.  She reports that she is having pain rated at 6/10.  She is using wheeled walker.  She didn't sleep well last night.  She talked with the NP and is taking her meds differently to try and help with the pain.  She ambulates into clinic using wheeled walker and is independent with sit to/from stand.    Difficulty:  Using walker, needs assistance with getting her R LE into the bed, Lives in a tri-level,  helps with food.  Right now she doesn't have to do stairs.    Start of Care: 2024  Onset Date: 1/3/24  Current symptoms/Complaints: pain

## 2024-01-04 NOTE — TELEPHONE ENCOUNTER
Pt had a RT TKR 01/03/2024    She states she is in pain and could not sleep.      She states she took the dilaudid at   5:30pm yesterday  11:15pm yesterday  2:45am     She also states she is taking Tylenol in between.     She would like to speak with you to know if she is doing the right thing or if she needs to up her medication

## 2024-01-09 ENCOUNTER — HOSPITAL ENCOUNTER (OUTPATIENT)
Facility: HOSPITAL | Age: 74
Setting detail: RECURRING SERIES
Discharge: HOME | End: 2024-01-12
Attending: ORTHOPAEDIC SURGERY
Payer: MEDICARE

## 2024-01-09 ENCOUNTER — TELEMEDICINE (OUTPATIENT)
Age: 74
End: 2024-01-09

## 2024-01-09 DIAGNOSIS — Z96.651 STATUS POST TOTAL RIGHT KNEE REPLACEMENT: ICD-10-CM

## 2024-01-09 DIAGNOSIS — M25.561 RIGHT KNEE PAIN, UNSPECIFIED CHRONICITY: Primary | ICD-10-CM

## 2024-01-09 PROCEDURE — 97140 MANUAL THERAPY 1/> REGIONS: CPT | Performed by: PHYSICAL THERAPIST

## 2024-01-09 PROCEDURE — 97110 THERAPEUTIC EXERCISES: CPT | Performed by: PHYSICAL THERAPIST

## 2024-01-09 PROCEDURE — 99024 POSTOP FOLLOW-UP VISIT: CPT

## 2024-01-09 RX ORDER — TRIAMCINOLONE ACETONIDE 1 MG/G
CREAM TOPICAL
Qty: 15 G | Refills: 0 | Status: SHIPPED | OUTPATIENT
Start: 2024-01-09

## 2024-01-09 RX ORDER — METHYLPREDNISOLONE 4 MG/1
TABLET ORAL
Qty: 1 KIT | Refills: 0 | Status: SHIPPED | OUTPATIENT
Start: 2024-01-09

## 2024-01-09 NOTE — PROGRESS NOTES
Clarissa Alonzo (:  1950) is a Established patient, evaluated via telephone on 2024    [facility]  [address] [place] [address] A  [facility] [address]  Dept: [phone]  Dept Fax: [phone]   Chief Complaint   Patient presents with    Post-Op Check    Knee Pain     Patient-Reported Vitals  No data recorded   Allergies   Allergen Reactions    Cimetidine Hives    Codeine Hives    Hydrocodone Hives    Oxycodone Hives    Cyanoacrylate Itching and Rash    Famotidine Rash    Midazolam Nausea And Vomiting    Omeprazole Hives and Rash    Sucralose Rash     Current Outpatient Medications   Medication Sig Dispense Refill    triamcinolone (KENALOG) 0.1 % cream APPLY A THIN LAYER TO THE AFFECTED AREA(S) BY TOPICAL ROUTE 2 TIMES PER DAY FOR 7-10 DAYS 15 g 0    methylPREDNISolone (MEDROL DOSEPACK) 4 MG tablet Take by mouth Per Dose pack instructions 1 kit 0    aspirin 325 MG tablet Take 1 tablet by mouth in the morning and at bedtime DO NOT START MEDICATION UNTIL 24 HOURS AFTER SURGERY 60 tablet 0    cephALEXin (KEFLEX) 500 MG capsule Take 1 capsule by mouth every 8 (eight) hours for 7 days Do not start medication until after surgery 21 capsule 0    ondansetron (ZOFRAN-ODT) 8 MG TBDP disintegrating tablet Place 1 tablet under the tongue every 8 hours as needed for Nausea or Vomiting 20 tablet 0    HYDROmorphone (DILAUDID) 2 MG tablet Take 1 tablet by mouth every 4-6 hours as needed for Pain for up to 8 days. DO NOT START PAIN MEDICATION UNTIL AFTER SURGERY! Max Daily Amount: 12 mg 30 tablet 0    calcium citrate-vitamin D (CITRACAL+D) 315-5 MG-MCG TABS per tablet Take 1 tablet by mouth daily (with breakfast)      Cholecalciferol 50 MCG ( UT) TABS Take 1 tablet by mouth daily      coenzyme Q10 100 MG CAPS capsule Take 200 mg by mouth daily      cyclobenzaprine (FLEXERIL) 10 MG tablet Take 10 mg by mouth nightly      esomeprazole

## 2024-01-09 NOTE — PATIENT INSTRUCTIONS
Post Operative Total Knee Replacement Instructions    PLEASE REMOVE YOUR LONG WHITE BANDAGE & STOCKING PRIOR TO CONNECTING TO YOUR APPOINTMENT       During your recovery from a total knee replacement, you will be participating in an OUTPATIENT physical therapy program. Your goal is to progress from a walker to a cane to nothing at all while walking, if possible, over the next 2 weeks.     You can now shower and get your incision wet, pat it dry afterwards. No further dressing changes will be required as long as there is no drainage.  You may take a bath 3 weeks post surgery as long as there is no drainage from your incision.    You may drive if you are not using any assistive devices to walk and are not using any narcotic pain medication.     You may discontinue your aspirin (if that is your primary blood thinner prescribed by Dr. Rebollar ) when you are at least 4 weeks out from surgery and are no longer using a cane or walker.  If you are still using assistive devices, please DO NOT stop the aspirin until you are completely off them.  If you are on other blood thinners prescribed by another doctor please continue that until you are instructed to discontinue them.    You and your physical therapist will determine when to stop your physical therapy program.    Narcotic pain medication can cause constipation.  You may take over the counter stool softeners such as Docusate Sodium or Miralax 1-2 times per day to assist with the constipation.  Ensure you are taking in plenty of fluids and fiber as well.    If you require a refill on a narcotic pain medication, please let Dr. Rebollar or his Nurse Practitioner know at your appointment today or AT LEAST 48 hours prior to needing it. No refills will be provided after hours or during weekends.    If you experience any significant calf pain, swelling, or shortness of breath, please call our office immediately or go to the nearest emergency room.    If you notice any significant

## 2024-01-09 NOTE — PROGRESS NOTES
PHYSICAL THERAPY - MEDICARE DAILY TREATMENT NOTE (updated 3/23)      Date: 2024          Patient Name:  Clarissa Alonzo :  1950   Medical   Diagnosis:  Status post total right knee replacement [Z96.651] Treatment Diagnosis:  M25.561  RIGHT KNEE PAIN    Referral Source:  Talib Rebollar MD Insurance:   Payor: MEDICARE / Plan: MEDICARE PART A AND B / Product Type: *No Product type* /                     Patient  verified yes     Visit #   Current  / Total 2 Medical necessity   Time   In / Out 1045 am  1130 am    Total Treatment Time 45 minutes   Total Timed Codes 39 minutes   1:1 Treatment Time 39 minutes.       [facility] Totals Reminder:  bill using total billable   min of TIMED therapeutic procedures and modalities.   8-22 min = 1 unit; 23-37 min = 2 units; 38-52 min = 3 units; 53-67 min = 4 units; 68-82 min = 5 units            SUBJECTIVE    Pain Level (0-10 scale): 5, taking Tylenol only     Any medication changes, allergies to medications, adverse drug reactions, diagnosis change, or new procedure performed?: [x] No    [] Yes (see summary sheet for update)  Medications: Verified on Patient Summary List    Subjective functional status/changes:     To PT with standard walker.  Limited right LE weight bearing.  Leaning on walker.     OBJECTIVE      Therapeutic Procedures:  Tx Min Billable or 1:1 Min (if diff from Tx Min) Procedure, Rationale, Specifics   10  38116 Manual Therapy (timed):  decrease pain, increase ROM, and increase tissue extensibility to improve patient's ability to progress to PLOF and address remaining functional goals.  The manual therapy interventions were performed at a separate and distinct time from the therapeutic activities interventions . (see flow sheet as applicable)     Details if applicable:  STM, patella mobilization. Extension/flexion.     29  02069 Therapeutic Exercise (timed):  increase ROM, strength, coordination, balance, and proprioception to improve patient's ability to

## 2024-01-12 ENCOUNTER — HOSPITAL ENCOUNTER (OUTPATIENT)
Facility: HOSPITAL | Age: 74
Setting detail: RECURRING SERIES
Discharge: HOME | End: 2024-01-15
Attending: ORTHOPAEDIC SURGERY
Payer: MEDICARE

## 2024-01-12 PROCEDURE — 97110 THERAPEUTIC EXERCISES: CPT

## 2024-01-12 PROCEDURE — 97140 MANUAL THERAPY 1/> REGIONS: CPT

## 2024-01-12 NOTE — PROGRESS NOTES
analyze and address functional mobility deficits, analyze and address ROM deficits, and analyze and address soft tissue restrictions to address functional deficits and attain remaining goals.    Progress toward goals / Updated goals:  []  See Progress Note/Recertification     Independent with HEP.  Able to extend R knee fully to 0 degrees.  Able to flex R knee to 90 degrees to aide with ambulation.  Long Term Goals: To be accomplished in 18 treatments.   Achieve R knee AROM 0-120 degrees.  Able to ambulate without assistive device community distances.  Able to negotiate stairs with reciprocal pattern.      PLAN  Yes  Continue plan of care  Re-Cert Due: 4/3/2024  [x]  Upgrade activities as tolerated  []  Discharge due to :  []  Other:      Jessenia Daly, PT       1/12/2024       12:24 PM

## 2024-01-16 ENCOUNTER — HOSPITAL ENCOUNTER (OUTPATIENT)
Facility: HOSPITAL | Age: 74
Setting detail: RECURRING SERIES
Discharge: HOME | End: 2024-01-19
Attending: ORTHOPAEDIC SURGERY
Payer: MEDICARE

## 2024-01-16 PROCEDURE — 97110 THERAPEUTIC EXERCISES: CPT | Performed by: PHYSICAL THERAPIST

## 2024-01-16 PROCEDURE — 97140 MANUAL THERAPY 1/> REGIONS: CPT | Performed by: PHYSICAL THERAPIST

## 2024-01-16 NOTE — PROGRESS NOTES
PHYSICAL THERAPY - MEDICARE DAILY TREATMENT NOTE (updated 3/23)      Date: 2024          Patient Name:  Clarissa Alonzo :  1950   Medical   Diagnosis:  Status post total right knee replacement [Z96.651] Treatment Diagnosis:  M25.561  RIGHT KNEE PAIN    Referral Source:  Talib Rebollar MD Insurance:   Payor: MEDICARE / Plan: MEDICARE PART A AND B / Product Type: *No Product type* /                     Patient  verified yes     Visit #   Current  / Total 4 18   Time   In / Out 945  1035   Total Treatment Time 50   Total Timed Codes 40   1:1 Treatment Time 40      [facility] Totals Reminder:  bill using total billable   min of TIMED therapeutic procedures and modalities.   8-22 min = 1 unit; 23-37 min = 2 units; 38-52 min = 3 units; 53-67 min = 4 units; 68-82 min = 5 units            SUBJECTIVE    Pain Level (0-10 scale): 2, taking Tylenol only     Any medication changes, allergies to medications, adverse drug reactions, diagnosis change, or new procedure performed?: [x] No    [] Yes (see summary sheet for update)  Medications: Verified on Patient Summary List    Subjective functional status/changes:     \"Stiff\".  Trying to exercise at home.   Can you show me how to use my cane.       OBJECTIVE      Therapeutic Procedures:  Tx Min Billable or 1:1 Min (if diff from Tx Min) Procedure, Rationale, Specifics   10  40981 Manual Therapy (timed):  decrease pain, increase ROM, and increase tissue extensibility to improve patient's ability to progress to PLOF and address remaining functional goals.  The manual therapy interventions were performed at a separate and distinct time from the therapeutic activities interventions . (see flow sheet as applicable)     Details if applicable:  STM, patella mobilization. Extension/flexion.     30  61788 Therapeutic Exercise (timed):  increase ROM, strength, coordination, balance, and proprioception to improve patient's ability to progress to PLOF and address remaining functional

## 2024-01-18 ENCOUNTER — PATIENT MESSAGE (OUTPATIENT)
Age: 74
End: 2024-01-18

## 2024-01-18 DIAGNOSIS — M25.561 RIGHT KNEE PAIN, UNSPECIFIED CHRONICITY: Primary | ICD-10-CM

## 2024-01-18 DIAGNOSIS — L76.82 OTHER POSTOPERATIVE COMPLICATION OF SKIN: ICD-10-CM

## 2024-01-18 DIAGNOSIS — Z96.651 STATUS POST TOTAL RIGHT KNEE REPLACEMENT: ICD-10-CM

## 2024-01-19 ENCOUNTER — HOSPITAL ENCOUNTER (OUTPATIENT)
Facility: HOSPITAL | Age: 74
Setting detail: RECURRING SERIES
Discharge: HOME | End: 2024-01-22
Attending: ORTHOPAEDIC SURGERY
Payer: MEDICARE

## 2024-01-19 PROCEDURE — 97110 THERAPEUTIC EXERCISES: CPT

## 2024-01-19 PROCEDURE — 97140 MANUAL THERAPY 1/> REGIONS: CPT

## 2024-01-19 RX ORDER — TRIAMCINOLONE ACETONIDE 1 MG/G
CREAM TOPICAL
Qty: 15 G | Refills: 0 | Status: SHIPPED | OUTPATIENT
Start: 2024-01-19

## 2024-01-19 RX ORDER — HYDROXYZINE PAMOATE 25 MG/1
25 CAPSULE ORAL 3 TIMES DAILY PRN
Qty: 42 CAPSULE | Refills: 0 | Status: SHIPPED | OUTPATIENT
Start: 2024-01-19 | End: 2024-02-02

## 2024-01-19 RX ORDER — TRAMADOL HYDROCHLORIDE 50 MG/1
50 TABLET ORAL EVERY 6 HOURS PRN
Qty: 30 TABLET | Refills: 0 | Status: SHIPPED | OUTPATIENT
Start: 2024-01-19 | End: 2024-01-27

## 2024-01-19 NOTE — PROGRESS NOTES
to PLOF and address remaining functional goals. (see flow sheet as applicable)     Details if applicable:           Details if applicable:           Details if applicable:            Details if applicable:     40     Total Total     [x]  Patient Education billed concurrently with other procedures   [x] Review HEP    [] Progressed/Changed HEP, detail:[] Other detail: [employer]:  [id number]      Other Objective/Functional Measures  Gait:  + SPC:  significant decrease in step length.     AROM:  flexion:  70, muscle guarding.   Full extension, passive.     Pain Level at end of session (0-10 scale): 2      Assessment   Patient advised to transition to SPC with emphasis on increasing step length and using available ROM.   Instructed her to increase the intensity of flexion program   Significant muscle guarding during manual therapy/PROM.  Redness over incision improving.   Overall, no adverse reactions from today's program.   Patient will continue to benefit from skilled PT / OT services to modify and progress therapeutic interventions, analyze and address functional mobility deficits, analyze and address ROM deficits, and analyze and address soft tissue restrictions to address functional deficits and attain remaining goals.    Progress toward goals / Updated goals:  []  See Progress Note/Recertification     Independent with HEP., progressing 1/16/2024  Able to extend R knee fully to 0 degrees, progressing 1/16/2024  Able to flex R knee to 90 degrees to aide with ambulation.  Long Term Goals: To be accomplished in 18 treatments.   Achieve R knee AROM 0-120 degrees.  Able to ambulate without assistive device community distances.  Able to negotiate stairs with reciprocal pattern.      PLAN  Yes  Continue plan of care  Re-Cert Due: 4/3/2024  [x]  Upgrade activities as tolerated  []  Discharge due to :  []  Other:      Jessenia Daly, PT       1/19/2024       6:45 PM

## 2024-01-23 ENCOUNTER — HOSPITAL ENCOUNTER (OUTPATIENT)
Facility: HOSPITAL | Age: 74
Setting detail: RECURRING SERIES
Discharge: HOME | End: 2024-01-26
Attending: ORTHOPAEDIC SURGERY
Payer: MEDICARE

## 2024-01-23 PROCEDURE — 97110 THERAPEUTIC EXERCISES: CPT | Performed by: PHYSICAL THERAPIST

## 2024-01-23 PROCEDURE — 97140 MANUAL THERAPY 1/> REGIONS: CPT | Performed by: PHYSICAL THERAPIST

## 2024-01-23 NOTE — PROGRESS NOTES
PHYSICAL THERAPY - MEDICARE DAILY TREATMENT NOTE (updated 3/23)      Date: 2024          Patient Name:  Clarissa Alonzo :  1950   Medical   Diagnosis:  Status post total right knee replacement [Z96.651] Treatment Diagnosis:  M25.561  RIGHT KNEE PAIN    Referral Source:  Talib Rebollar MD Insurance:   Payor: MEDICARE / Plan: MEDICARE PART A AND B / Product Type: *No Product type* /                     Patient  verified yes     Visit #   Current  / Total 7 18   Time   In / Out 1040 1120   Total Treatment Time 50   Total Timed Codes 40   1:1 Treatment Time 40      [facility] Totals Reminder:  bill using total billable   min of TIMED therapeutic procedures and modalities.   8-22 min = 1 unit; 23-37 min = 2 units; 38-52 min = 3 units; 53-67 min = 4 units; 68-82 min = 5 units            SUBJECTIVE    Pain Level (0-10 scale): 1, taking Tylenol only     Any medication changes, allergies to medications, adverse drug reactions, diagnosis change, or new procedure performed?: [x] No    [] Yes (see summary sheet for update)  Medications: Verified on Patient Summary List    Subjective functional status/changes:       \"Stiff\".  Glue will not come off.      OBJECTIVE      Therapeutic Procedures:  Tx Min Billable or 1:1 Min (if diff from Tx Min) Procedure, Rationale, Specifics   10  68602 Manual Therapy (timed):  decrease pain, increase ROM, and increase tissue extensibility to improve patient's ability to progress to PLOF and address remaining functional goals.  The manual therapy interventions were performed at a separate and distinct time from the therapeutic activities interventions . (see flow sheet as applicable)     Details if applicable:  STM, patella mobilization. Extension/flexion.     30  54064 Therapeutic Exercise (timed):  increase ROM, strength, coordination, balance, and proprioception to improve patient's ability to progress to PLOF and address remaining functional goals. (see flow sheet as applicable)

## 2024-01-23 NOTE — PROGRESS NOTES
PHYSICAL THERAPY - MEDICARE DAILY TREATMENT NOTE (updated 3/23)  0  Medical   Diagnosis:  Status post total right knee replacement [Z96.651] Treatment Diagnosis:  M25.561  RIGHT KNEE PAIN    Referral Source:  Talib Rebollar MD Insurance:   Payor: MEDICARE / Plan: MEDICARE PART A AND B / Product Type: *No Product type* /                     Patient  verified yes     Visit #   Current  / Total 6 18   Time   In / Out 10:15 am  1105   Total Treatment Time 50   Total Timed Codes 40   1:1 Treatment Time 40      [facility] Totals Reminder:  bill using total billable   min of TIMED therapeutic procedures and modalities.   8-22 min = 1 unit; 23-37 min = 2 units; 38-52 min = 3 units; 53-67 min = 4 units; 68-82 min = 5 units            SUBJECTIVE    Pain Level (0-10 scale): 3, stiffness.     Any medication changes, allergies to medications, adverse drug reactions, diagnosis change, or new procedure performed?: [x] No    [] Yes (see summary sheet for update)  Medications: Verified on Patient Summary List    Subjective functional status/changes:       \"Stiff\".  Glue will not come off.      OBJECTIVE      Therapeutic Procedures:  Tx Min Billable or 1:1 Min (if diff from Tx Min) Procedure, Rationale, Specifics   10  09596 Manual Therapy (timed):  decrease pain, increase ROM, and increase tissue extensibility to improve patient's ability to progress to PLOF and address remaining functional goals.  The manual therapy interventions were performed at a separate and distinct time from the therapeutic activities interventions . (see flow sheet as applicable)     Details if applicable:  STM, patella mobilization. Extension/flexion.     30  56185 Therapeutic Exercise (timed):  increase ROM, strength, coordination, balance, and proprioception to improve patient's ability to progress to PLOF and address remaining functional goals. (see flow sheet as applicable)     Details if applicable:           Details if applicable:           Details

## 2024-01-25 ENCOUNTER — TELEPHONE (OUTPATIENT)
Age: 74
End: 2024-01-25

## 2024-01-25 NOTE — TELEPHONE ENCOUNTER
RT TKR 01/03/2024    Pt is requesting to stop taking the Asprin 325mg and she would like to start taking Meloxicam if possible.     She requested to speak with you.

## 2024-01-26 ENCOUNTER — HOSPITAL ENCOUNTER (OUTPATIENT)
Facility: HOSPITAL | Age: 74
Setting detail: RECURRING SERIES
Discharge: HOME | End: 2024-01-29
Attending: ORTHOPAEDIC SURGERY
Payer: MEDICARE

## 2024-01-26 PROCEDURE — 97110 THERAPEUTIC EXERCISES: CPT | Performed by: PHYSICAL THERAPIST

## 2024-01-26 PROCEDURE — 97140 MANUAL THERAPY 1/> REGIONS: CPT | Performed by: PHYSICAL THERAPIST

## 2024-01-26 NOTE — PROGRESS NOTES
PHYSICAL THERAPY - MEDICARE DAILY TREATMENT NOTE (updated 3/23)  0  Medical   Diagnosis:  Status post total right knee replacement [Z96.651] Treatment Diagnosis:  M25.561  RIGHT KNEE PAIN    Referral Source:  Talib Rebollar MD Insurance:   Payor: MEDICARE / Plan: MEDICARE PART A AND B / Product Type: *No Product type* /                     Patient  verified yes     Visit #   Current  / Total 6 18   Time   In / Out 10:15 am  1105   Total Treatment Time 50   Total Timed Codes 40   1:1 Treatment Time 40      [facility] Totals Reminder:  bill using total billable   min of TIMED therapeutic procedures and modalities.   8-22 min = 1 unit; 23-37 min = 2 units; 38-52 min = 3 units; 53-67 min = 4 units; 68-82 min = 5 units            SUBJECTIVE    Pain Level (0-10 scale): 3, stiffness.     Any medication changes, allergies to medications, adverse drug reactions, diagnosis change, or new procedure performed?: [x] No    [] Yes (see summary sheet for update)  Medications: Verified on Patient Summary List    Subjective functional status/changes:       \"Stiff\".  Glue will not come off.      OBJECTIVE      Therapeutic Procedures:  Tx Min Billable or 1:1 Min (if diff from Tx Min) Procedure, Rationale, Specifics   10  85387 Manual Therapy (timed):  decrease pain, increase ROM, and increase tissue extensibility to improve patient's ability to progress to PLOF and address remaining functional goals.  The manual therapy interventions were performed at a separate and distinct time from the therapeutic activities interventions . (see flow sheet as applicable)     Details if applicable:  STM, patella mobilization. Extension/flexion.     30  19399 Therapeutic Exercise (timed):  increase ROM, strength, coordination, balance, and proprioception to improve patient's ability to progress to PLOF and address remaining functional goals. (see flow sheet as applicable)     Details if applicable:           Details if applicable:           Details

## 2024-01-26 NOTE — PROGRESS NOTES
PHYSICAL THERAPY - MEDICARE DAILY TREATMENT NOTE (updated 3/23)  0  Medical   Diagnosis:  Status post total right knee replacement [Z96.651] Treatment Diagnosis:  M25.561  RIGHT KNEE PAIN    Referral Source:  Talib Rebollar MD Insurance:   Payor: MEDICARE / Plan: MEDICARE PART A AND B / Product Type: *No Product type* /                     Patient  verified yes     Visit #   Current  / Total 7 18   Time   In / Out 10:10  am  11:00  am    Total Treatment Time 45    Total Timed Codes 40   1:1 Treatment Time 40      [facility] Totals Reminder:  bill using total billable   min of TIMED therapeutic procedures and modalities.   8-22 min = 1 unit; 23-37 min = 2 units; 38-52 min = 3 units; 53-67 min = 4 units; 68-82 min = 5 units            SUBJECTIVE    Pain Level (0-10 scale): 2 , stiffness.     Any medication changes, allergies to medications, adverse drug reactions, diagnosis change, or new procedure performed?: [x] No    [] Yes (see summary sheet for update)  Medications: Verified on Patient Summary List    Subjective functional status/changes:       Exercising at home.   Knee continues to feel stiff     OBJECTIVE      Therapeutic Procedures:  Tx Min Billable or 1:1 Min (if diff from Tx Min) Procedure, Rationale, Specifics   10  51372 Manual Therapy (timed):  decrease pain, increase ROM, and increase tissue extensibility to improve patient's ability to progress to PLOF and address remaining functional goals.  The manual therapy interventions were performed at a separate and distinct time from the therapeutic activities interventions . (see flow sheet as applicable)     Details if applicable:  STM, patella mobilization. Extension/flexion.     30  70112 Therapeutic Exercise (timed):  increase ROM, strength, coordination, balance, and proprioception to improve patient's ability to progress to PLOF and address remaining functional goals. (see flow sheet as applicable)     Details if applicable:           Details if

## 2024-01-30 ENCOUNTER — HOSPITAL ENCOUNTER (OUTPATIENT)
Facility: HOSPITAL | Age: 74
Setting detail: RECURRING SERIES
Discharge: HOME | End: 2024-02-02
Attending: ORTHOPAEDIC SURGERY
Payer: MEDICARE

## 2024-01-30 PROCEDURE — 97140 MANUAL THERAPY 1/> REGIONS: CPT

## 2024-01-30 PROCEDURE — 97110 THERAPEUTIC EXERCISES: CPT

## 2024-01-30 NOTE — PROGRESS NOTES
PHYSICAL THERAPY - MEDICARE DAILY TREATMENT NOTE (updated 3/23)  0  Medical   Diagnosis:  Status post total right knee replacement [Z96.651] Treatment Diagnosis:  M25.561  RIGHT KNEE PAIN    Referral Source:  Talib Rebollar MD Insurance:   Payor: MEDICARE / Plan: MEDICARE PART A AND B / Product Type: *No Product type* /                     Patient  verified yes     Visit #   Current  / Total 8 18   Time   In / Out 1030 am  1115   Total Treatment Time 45   Total Timed Codes 38   1:1 Treatment Time 38      [facility] Totals Reminder:  bill using total billable   min of TIMED therapeutic procedures and modalities.   8-22 min = 1 unit; 23-37 min = 2 units; 38-52 min = 3 units; 53-67 min = 4 units; 68-82 min = 5 units            SUBJECTIVE    Pain Level (0-10 scale): 2 stiffness.     Any medication changes, allergies to medications, adverse drug reactions, diagnosis change, or new procedure performed?: [x] No    [] Yes (see summary sheet for update)  Medications: Verified on Patient Summary List    Subjective functional status/changes:       \"Stiff\".  Glue will not come off.      OBJECTIVE      Therapeutic Procedures:  Tx Min Billable or 1:1 Min (if diff from Tx Min) Procedure, Rationale, Specifics   10  05308 Manual Therapy (timed):  decrease pain, increase ROM, and increase tissue extensibility to improve patient's ability to progress to PLOF and address remaining functional goals.  The manual therapy interventions were performed at a separate and distinct time from the therapeutic activities interventions . (see flow sheet as applicable)     Details if applicable:  STM, patella mobilization. Extension/flexion.     28  23057 Therapeutic Exercise (timed):  increase ROM, strength, coordination, balance, and proprioception to improve patient's ability to progress to PLOF and address remaining functional goals. (see flow sheet as applicable)     Details if applicable:           Details if applicable:           Details if

## 2024-01-31 ENCOUNTER — TELEPHONE (OUTPATIENT)
Age: 74
End: 2024-01-31

## 2024-01-31 ENCOUNTER — OFFICE VISIT (OUTPATIENT)
Age: 74
End: 2024-01-31

## 2024-01-31 DIAGNOSIS — Z96.651 STATUS POST TOTAL RIGHT KNEE REPLACEMENT: ICD-10-CM

## 2024-01-31 DIAGNOSIS — M25.561 RIGHT KNEE PAIN, UNSPECIFIED CHRONICITY: Primary | ICD-10-CM

## 2024-01-31 DIAGNOSIS — M24.561 CONTRACTURE OF RIGHT KNEE: ICD-10-CM

## 2024-01-31 PROCEDURE — 99024 POSTOP FOLLOW-UP VISIT: CPT

## 2024-01-31 RX ORDER — CYCLOBENZAPRINE HCL 10 MG
5 TABLET ORAL 2 TIMES DAILY PRN
Qty: 15 TABLET | Refills: 0 | Status: SHIPPED | OUTPATIENT
Start: 2024-01-31 | End: 2024-01-31 | Stop reason: SDUPTHER

## 2024-01-31 RX ORDER — TRIAMTERENE AND HYDROCHLOROTHIAZIDE 37.5; 25 MG/1; MG/1
TABLET ORAL
COMMUNITY
Start: 2024-01-09

## 2024-01-31 RX ORDER — CYCLOBENZAPRINE HCL 10 MG
5 TABLET ORAL 2 TIMES DAILY PRN
Qty: 15 TABLET | Refills: 0 | Status: SHIPPED | OUTPATIENT
Start: 2024-01-31 | End: 2024-02-15

## 2024-01-31 NOTE — TELEPHONE ENCOUNTER
Clarissa Alonzo called stating her medication was sent to [facility] and not to [facility] in [place]. She would like you to have it transferred to the [facility].    [address] [facility] [address]

## 2024-01-31 NOTE — PROGRESS NOTES
Name: Clarissa Alonzo    : 1950        3/17/2022     4:41 PM 3/17/2022     4:11 PM 3/17/2022     3:41 PM 3/17/2022     3:11 PM 3/17/2022     2:56 PM 3/17/2022     2:41 PM 3/17/2022     2:26 PM   Ambulatory Bariatric Summary   Systolic 147 109 160 151 135 131 132   Diastolic 83 83 60 83 65 62 64   Pulse 95 96 97 96 88 85 83       There is no height or weight on file to calculate BMI.    Service Dept: [facility]OP[facility] MEDICINE  [address], [address]  [facility] [address]  Dept: [phone]  Dept Fax: [phone]     Patient's Pharmacies:    [facility] - [place], [place] - [facility] [facility] ([place], [place]) - P [phone] - F [phone]  [address] [place] [address]  Phone: [phone] Fax: [phone]    [id number] - [place], [place] - [address] - P [phone] - F [phone]  [facility] [address]  Phone: [phone] Fax: [phone]       Chief Complaint   Patient presents with    Post-Op Check    Knee Pain       HPI:  Patient presents for postop care following right TKA. Surgery was on 1/3/2024.  Ambulating good with a cane. Pain is a 1-4/10 depending on activity. Pain is controlled with current analgesics. Medication(s) being used: acetaminophen. Patient previously had some contact dermatitis that she was placed on an oral steroid and steroid cream.  Patient has also utilized atarax and benadryl to assist with itching.  Patient reports that the dermatitis has nearly fully resolved and with the itching.  Per PT note from 2024, patient's range of motion is from 5-96 degrees.      There were no vitals taken for this visit.   Allergies   Allergen Reactions    Cimetidine Hives    Codeine Hives    Hydrocodone Hives    Oxycodone Hives    Cyanoacrylate Itching and Rash    Famotidine Rash    Midazolam Nausea And Vomiting    Omeprazole Hives and Rash    Sucralose Rash      Current Outpatient Medications

## 2024-02-02 ENCOUNTER — HOSPITAL ENCOUNTER (OUTPATIENT)
Facility: HOSPITAL | Age: 74
Setting detail: RECURRING SERIES
Discharge: HOME | End: 2024-02-05
Attending: ORTHOPAEDIC SURGERY
Payer: MEDICARE

## 2024-02-02 PROCEDURE — 97110 THERAPEUTIC EXERCISES: CPT | Performed by: PHYSICAL THERAPIST

## 2024-02-02 PROCEDURE — 97140 MANUAL THERAPY 1/> REGIONS: CPT | Performed by: PHYSICAL THERAPIST

## 2024-02-02 NOTE — PROGRESS NOTES
PHYSICAL THERAPY - MEDICARE DAILY TREATMENT NOTE (updated 3/23)  0  Medical   Diagnosis:  Status post total right knee replacement [Z96.651] Treatment Diagnosis:  M25.561  RIGHT KNEE PAIN    Referral Source:  Talib Rebollar MD Insurance:   Payor: MEDICARE / Plan: MEDICARE PART A AND B / Product Type: *No Product type* /                     Patient  verified yes     Visit #   Current  / Total 8 18   Time   In / Out 1030 am  1115   Total Treatment Time 45   Total Timed Codes 38   1:1 Treatment Time 38      [facility] Totals Reminder:  bill using total billable   min of TIMED therapeutic procedures and modalities.   8-22 min = 1 unit; 23-37 min = 2 units; 38-52 min = 3 units; 53-67 min = 4 units; 68-82 min = 5 units            SUBJECTIVE    Pain Level (0-10 scale): 2 stiffness.     Any medication changes, allergies to medications, adverse drug reactions, diagnosis change, or new procedure performed?: [x] No    [] Yes (see summary sheet for update)  Medications: Verified on Patient Summary List    Subjective functional status/changes:       She reports the MD was not pleased with bend.   Work harder.       OBJECTIVE      Therapeutic Procedures:  Tx Min Billable or 1:1 Min (if diff from Tx Min) Procedure, Rationale, Specifics   10  83380 Manual Therapy (timed):  decrease pain, increase ROM, and increase tissue extensibility to improve patient's ability to progress to PLOF and address remaining functional goals.  The manual therapy interventions were performed at a separate and distinct time from the therapeutic activities interventions . (see flow sheet as applicable)     Details if applicable:  STM, patella mobilization. Extension/flexion.     35   71379 Therapeutic Exercise (timed):  increase ROM, strength, coordination, balance, and proprioception to improve patient's ability to progress to PLOF and address remaining functional goals. (see flow sheet as applicable)     Details if applicable:           Details if

## 2024-02-02 NOTE — PROGRESS NOTES
[facility]  [address], [address]  [place] [address]  Phone: [phone]    Fax: [phone]    PHYSICAL THERAPY PROGRESS NOTE  Patient Name:  Clarissa Alonzo :  1950   Treatment/Medical Diagnosis: Status post total right knee replacement [Z96.651]   Referral Source:  Talib Rebollar MD     Date of Initial Visit:  2024 Attended Visits:  6 Missed Visits:  0     SUMMARY OF TREATMENT/ASSESSMENT:  S/p THR 1/3/2024    CURRENT STATUS   Independent with HEP., progressing 2024  Able to extend R knee fully to 0 degrees, progressing 2024  Able to flex R knee to 90 degrees to aide with ambulation., progressing 2024   Long Term Goals: To be accomplished in 18 treatments.   Achieve R knee AROM 0-120 degrees.  Able to ambulate without assistive device community distances.  Able to negotiate stairs with reciprocal pattern.    RECOMMENDATIONS  Continue plan of care with emphasized.           Meliza Leonard, PT       2024       11:26 AM    If you have any questions/comments please contact us directly at [phone].   Thank you for allowing us to assist in the care of your patient.

## 2024-02-05 ENCOUNTER — APPOINTMENT (OUTPATIENT)
Facility: HOSPITAL | Age: 74
End: 2024-02-05
Attending: ORTHOPAEDIC SURGERY
Payer: MEDICARE

## 2024-02-09 ENCOUNTER — HOSPITAL ENCOUNTER (OUTPATIENT)
Facility: HOSPITAL | Age: 74
Setting detail: RECURRING SERIES
Discharge: HOME | End: 2024-02-12
Attending: ORTHOPAEDIC SURGERY
Payer: MEDICARE

## 2024-02-09 PROCEDURE — 97140 MANUAL THERAPY 1/> REGIONS: CPT | Performed by: PHYSICAL THERAPIST

## 2024-02-09 PROCEDURE — 97110 THERAPEUTIC EXERCISES: CPT | Performed by: PHYSICAL THERAPIST

## 2024-02-09 NOTE — PROGRESS NOTES
PHYSICAL THERAPY - MEDICARE DAILY TREATMENT NOTE (updated 3/23)  0  Medical   Diagnosis:  Status post total right knee replacement [Z96.651] Treatment Diagnosis:  M25.561  RIGHT KNEE PAIN    Referral Source:  Talib Rebollar MD Insurance:   Payor: MEDICARE / Plan: MEDICARE PART A AND B / Product Type: *No Product type* /                     Patient  verified yes     Visit #   Current  / Total 10 18   Time   In / Out 1015  am  1115   Total Treatment Time 60   Total Timed Codes 45 minutes   1:1 Treatment Time 45 minutes      [facility] Totals Reminder:  bill using total billable   min of TIMED therapeutic procedures and modalities.   8-22 min = 1 unit; 23-37 min = 2 units; 38-52 min = 3 units; 53-67 min = 4 units; 68-82 min = 5 units            SUBJECTIVE    Pain Level (0-10 scale): 1 stiffness.     Any medication changes, allergies to medications, adverse drug reactions, diagnosis change, or new procedure performed?: [x] No    [] Yes (see summary sheet for update)  Medications: Verified on Patient Summary List    Subjective functional status/changes:       She reports the MD was not pleased with bend.   Work harder.   He wants me to get a dynasplint.      OBJECTIVE      Therapeutic Procedures:  Tx Min Billable or 1:1 Min (if diff from Tx Min) Procedure, Rationale, Specifics   15  13598 Manual Therapy (timed):  decrease pain, increase ROM, and increase tissue extensibility to improve patient's ability to progress to PLOF and address remaining functional goals.  The manual therapy interventions were performed at a separate and distinct time from the therapeutic activities interventions . (see flow sheet as applicable)     Details if applicable:  STM, patella mobilization. Extension/flexion.     30  35959 Therapeutic Exercise (timed):  increase ROM, strength, coordination, balance, and proprioception to improve patient's ability to progress to PLOF and address remaining functional goals. (see flow sheet as applicable)

## 2024-02-13 ENCOUNTER — HOSPITAL ENCOUNTER (OUTPATIENT)
Facility: HOSPITAL | Age: 74
Setting detail: RECURRING SERIES
Discharge: HOME | End: 2024-02-16
Attending: ORTHOPAEDIC SURGERY
Payer: MEDICARE

## 2024-02-13 PROCEDURE — 97140 MANUAL THERAPY 1/> REGIONS: CPT

## 2024-02-13 PROCEDURE — 97110 THERAPEUTIC EXERCISES: CPT

## 2024-02-13 NOTE — PROGRESS NOTES
PHYSICAL THERAPY - MEDICARE DAILY TREATMENT NOTE (updated 3/23)  0  Medical   Diagnosis:  Status post total right knee replacement [Z96.651] Treatment Diagnosis:  M25.561  RIGHT KNEE PAIN    Referral Source:  Talib Rebollar MD Insurance:   Payor: MEDICARE / Plan: MEDICARE PART A AND B / Product Type: *No Product type* /                     Patient  verified yes     Visit #   Current  / Total 11 18   Time   In / Out 1015  am  1110   Total Treatment Time 55   Total Timed Codes 54 minutes   1:1 Treatment Time 54 minutes      [facility] Totals Reminder:  bill using total billable   min of TIMED therapeutic procedures and modalities.   8-22 min = 1 unit; 23-37 min = 2 units; 38-52 min = 3 units; 53-67 min = 4 units; 68-82 min = 5 units            SUBJECTIVE    Pain Level (0-10 scale): 2 stiffness.     Any medication changes, allergies to medications, adverse drug reactions, diagnosis change, or new procedure performed?: [x] No    [] Yes (see summary sheet for update)  Medications: Verified on Patient Summary List    Subjective functional status/changes:     Pt reports increased pain past 2 days and this morning with relief after walking around and stretching.   HEP complaint    OBJECTIVE      Therapeutic Procedures:  Tx Min Billable or 1:1 Min (if diff from Tx Min) Procedure, Rationale, Specifics   15  58680 Manual Therapy (timed):  decrease pain, increase ROM, and increase tissue extensibility to improve patient's ability to progress to PLOF and address remaining functional goals.  The manual therapy interventions were performed at a separate and distinct time from the therapeutic activities interventions . (see flow sheet as applicable)     Details if applicable:  STM, patella mobilization. Extension/flexion.     39  32225 Therapeutic Exercise (timed):  increase ROM, strength, coordination, balance, and proprioception to improve patient's ability to progress to PLOF and address remaining functional goals. (see flow

## 2024-02-14 ENCOUNTER — TELEMEDICINE (OUTPATIENT)
Age: 74
End: 2024-02-14

## 2024-02-14 DIAGNOSIS — M25.561 RIGHT KNEE PAIN, UNSPECIFIED CHRONICITY: ICD-10-CM

## 2024-02-14 DIAGNOSIS — Z96.651 STATUS POST TOTAL RIGHT KNEE REPLACEMENT: Primary | ICD-10-CM

## 2024-02-14 PROCEDURE — 99024 POSTOP FOLLOW-UP VISIT: CPT

## 2024-02-14 NOTE — PROGRESS NOTES
Clarissa Alonzo (:  1950) is a Established patient, evaluated via telephone on 2024    [facility]IC[facility]  [address] [place] [address] A  [facility] [address]  Dept: [phone]  Dept Fax: [phone]   Chief Complaint   Patient presents with    Knee Pain    Post-Op Check     Patient-Reported Vitals  No data recorded   Allergies   Allergen Reactions    Cimetidine Hives    Codeine Hives    Hydrocodone Hives    Oxycodone Hives    Cyanoacrylate Itching and Rash    Famotidine Rash    Midazolam Nausea And Vomiting    Omeprazole Hives and Rash    Sucralose Rash     Current Outpatient Medications   Medication Sig Dispense Refill    triamterene-hydroCHLOROthiazide (MAXZIDE-25) 37.5-25 MG per tablet TAKE 1/2 TABLET BY MOUTH IN THE MORNING      cyclobenzaprine (FLEXERIL) 10 MG tablet Take 0.5 tablets by mouth 2 times daily as needed for Muscle spasms 15 tablet 0    triamcinolone (KENALOG) 0.1 % cream APPLY A THIN LAYER TO THE AFFECTED AREA(S) BY TOPICAL ROUTE 2 TIMES PER DAY FOR 7-10 DAYS 15 g 0    methylPREDNISolone (MEDROL DOSEPACK) 4 MG tablet Take by mouth Per Dose pack instructions 1 kit 0    ondansetron (ZOFRAN-ODT) 8 MG TBDP disintegrating tablet Place 1 tablet under the tongue every 8 hours as needed for Nausea or Vomiting 20 tablet 0    HYDROmorphone (DILAUDID) 2 MG tablet Take 1 tablet by mouth every 4-6 hours as needed for Pain for up to 8 days. DO NOT START PAIN MEDICATION UNTIL AFTER SURGERY! Max Daily Amount: 12 mg 30 tablet 0    meloxicam (MOBIC) 15 MG tablet TAKE ONE TABLET BY MOUTH EVERY DAY as needed for pain      rosuvastatin (CRESTOR) 10 MG tablet Take 10 mg by mouth nightly       No current facility-administered medications for this visit.      Patient Active Problem List   Diagnosis    Posterior tibial tendon dysfunction, right    Macromastia    OA (osteoarthritis) of knee      Family History   Problem Relation Age of

## 2024-02-16 ENCOUNTER — HOSPITAL ENCOUNTER (OUTPATIENT)
Facility: HOSPITAL | Age: 74
Setting detail: RECURRING SERIES
Discharge: HOME | End: 2024-02-19
Attending: ORTHOPAEDIC SURGERY
Payer: MEDICARE

## 2024-02-16 PROCEDURE — 97110 THERAPEUTIC EXERCISES: CPT | Performed by: PHYSICAL THERAPIST

## 2024-02-16 PROCEDURE — 97140 MANUAL THERAPY 1/> REGIONS: CPT | Performed by: PHYSICAL THERAPIST

## 2024-02-16 NOTE — PROGRESS NOTES
PHYSICAL THERAPY - MEDICARE DAILY TREATMENT NOTE (updated 3/23)  0  Medical   Diagnosis:  Status post total right knee replacement [Z96.651] Treatment Diagnosis:  M25.561  RIGHT KNEE PAIN    Referral Source:  Talib Rebollar MD Insurance:   Payor: MEDICARE / Plan: MEDICARE PART A AND B / Product Type: *No Product type* /                     Patient  verified yes     Visit #   Current  / Total 12 18   Time   In / Out 1000  am  1000   Total Treatment Time 60   Total Timed Codes  45 minutes   1:1 Treatment Time 45  minutes      [facility] Totals Reminder:  bill using total billable   min of TIMED therapeutic procedures and modalities.   8-22 min = 1 unit; 23-37 min = 2 units; 38-52 min = 3 units; 53-67 min = 4 units; 68-82 min = 5 units            SUBJECTIVE    Pain Level (0-10 scale): 2 stiffness.     Any medication changes, allergies to medications, adverse drug reactions, diagnosis change, or new procedure performed?: [x] No    [] Yes (see summary sheet for update)  Medications: Verified on Patient Summary List    Subjective functional status/changes:     Pt reports increased pain past 2 days and this morning with relief after walking around and stretching.   HEP complaint    OBJECTIVE      Therapeutic Procedures:  Tx Min Billable or 1:1 Min (if diff from Tx Min) Procedure, Rationale, Specifics   10  82318 Manual Therapy (timed):  decrease pain, increase ROM, and increase tissue extensibility to improve patient's ability to progress to PLOF and address remaining functional goals.  The manual therapy interventions were performed at a separate and distinct time from the therapeutic activities interventions . (see flow sheet as applicable)     Details if applicable:  STM, patella mobilization. Extension/flexion.       69662 Therapeutic Exercise (timed):  increase ROM, strength, coordination, balance, and proprioception to improve patient's ability to progress to PLOF and address remaining functional goals. (see flow

## 2024-02-21 ENCOUNTER — HOSPITAL ENCOUNTER (OUTPATIENT)
Facility: HOSPITAL | Age: 74
Setting detail: RECURRING SERIES
Discharge: HOME | End: 2024-02-24
Attending: ORTHOPAEDIC SURGERY
Payer: MEDICARE

## 2024-02-21 PROCEDURE — 97140 MANUAL THERAPY 1/> REGIONS: CPT | Performed by: PHYSICAL THERAPIST

## 2024-02-21 PROCEDURE — 97110 THERAPEUTIC EXERCISES: CPT | Performed by: PHYSICAL THERAPIST

## 2024-02-21 NOTE — PROGRESS NOTES
PHYSICAL THERAPY - MEDICARE DAILY TREATMENT NOTE (updated 3/23)  0  Medical   Diagnosis:  Status post total right knee replacement [Z96.651] Treatment Diagnosis:  M25.561  RIGHT KNEE PAIN    Referral Source:  Talib Rebollar MD Insurance:   Payor: MEDICARE / Plan: MEDICARE PART A AND B / Product Type: *No Product type* /                     Patient  verified yes     Visit #   Current  / Total 13  18   Time   In / Out 230 pm 320 pm    Total Treatment Time 50 minutes   Total Timed Codes 40 minutes   1:1 Treatment Time 40 minutes      [facility] Totals Reminder:  bill using total billable   min of TIMED therapeutic procedures and modalities.   8-22 min = 1 unit; 23-37 min = 2 units; 38-52 min = 3 units; 53-67 min = 4 units; 68-82 min = 5 units            SUBJECTIVE    Pain Level (0-10 scale): 2 stiffness.     Any medication changes, allergies to medications, adverse drug reactions, diagnosis change, or new procedure performed?: [x] No    [] Yes (see summary sheet for update)  Medications: Verified on Patient Summary List    Subjective functional status/changes:       I am stiff .   Not really hurting.  Trying to stretch more aggressively at home.     OBJECTIVE      Therapeutic Procedures:  Tx Min Billable or 1:1 Min (if diff from Tx Min) Procedure, Rationale, Specifics   10  11988 Manual Therapy (timed):  decrease pain, increase ROM, and increase tissue extensibility to improve patient's ability to progress to PLOF and address remaining functional goals.  The manual therapy interventions were performed at a separate and distinct time from the therapeutic activities interventions . (see flow sheet as applicable)     Details if applicable:  STM, patella mobilization. Extension/flexion.     30  26475 Therapeutic Exercise (timed):  increase ROM, strength, coordination, balance, and proprioception to improve patient's ability to progress to PLOF and address remaining functional goals. (see flow sheet as applicable)

## 2024-02-23 ENCOUNTER — HOSPITAL ENCOUNTER (OUTPATIENT)
Facility: HOSPITAL | Age: 74
Setting detail: RECURRING SERIES
End: 2024-02-23
Attending: ORTHOPAEDIC SURGERY
Payer: MEDICARE

## 2024-02-26 RX ORDER — HYDROXYZINE PAMOATE 25 MG/1
25 CAPSULE ORAL 3 TIMES DAILY PRN
Qty: 42 CAPSULE | Refills: 0 | Status: SHIPPED | OUTPATIENT
Start: 2024-02-26 | End: 2024-03-11

## 2024-02-28 ENCOUNTER — HOSPITAL ENCOUNTER (OUTPATIENT)
Facility: HOSPITAL | Age: 74
Setting detail: RECURRING SERIES
Discharge: HOME | End: 2024-03-02
Attending: ORTHOPAEDIC SURGERY
Payer: MEDICARE

## 2024-02-28 PROCEDURE — 97110 THERAPEUTIC EXERCISES: CPT | Performed by: PHYSICAL THERAPIST

## 2024-02-28 PROCEDURE — 97140 MANUAL THERAPY 1/> REGIONS: CPT | Performed by: PHYSICAL THERAPIST

## 2024-02-28 NOTE — PROGRESS NOTES
PHYSICAL THERAPY - MEDICARE DAILY TREATMENT NOTE (updated 3/23)  0  Medical   Diagnosis:  Status post total right knee replacement [Z96.651] Treatment Diagnosis:  M25.561  RIGHT KNEE PAIN    Referral Source:  Talib Rebollar MD Insurance:   Payor: MEDICARE / Plan: MEDICARE PART A AND B / Product Type: *No Product type* /                     Patient  verified yes     Visit #   Current  / Total 14 18   Time   In / Out 1000 pm 1050 pm    Total Treatment Time 50 minutes   Total Timed Codes 45 minutes   1:1 Treatment Time 45 minutes      [facility] Totals Reminder:  bill using total billable   min of TIMED therapeutic procedures and modalities.   8-22 min = 1 unit; 23-37 min = 2 units; 38-52 min = 3 units; 53-67 min = 4 units; 68-82 min = 5 units            SUBJECTIVE    Pain Level (0-10 scale): 2 stiffness.     Any medication changes, allergies to medications, adverse drug reactions, diagnosis change, or new procedure performed?: [x] No    [] Yes (see summary sheet for update)  Medications: Verified on Patient Summary List    Subjective functional status/changes:       I am doing HEP.  However, I am not doing the activities that you most requested, prone and sitting hang.       OBJECTIVE      Therapeutic Procedures:  Tx Min Billable or 1:1 Min (if diff from Tx Min) Procedure, Rationale, Specifics   10  88253 Manual Therapy (timed):  decrease pain, increase ROM, and increase tissue extensibility to improve patient's ability to progress to PLOF and address remaining functional goals.  The manual therapy interventions were performed at a separate and distinct time from the therapeutic activities interventions . (see flow sheet as applicable)     Details if applicable:  STM, patella mobilization. Extension/flexion.     35  44513 Therapeutic Exercise (timed):  increase ROM, strength, coordination, balance, and proprioception to improve patient's ability to progress to PLOF and address remaining functional goals. (see flow

## 2024-02-28 NOTE — PROGRESS NOTES
PHYSICAL THERAPY - MEDICARE DAILY TREATMENT NOTE (updated 3/23)  0  Medical   Diagnosis:  Status post total right knee replacement [Z96.651] Treatment Diagnosis:  M25.561  RIGHT KNEE PAIN    Referral Source:  Talib Rebollar MD Insurance:   Payor: MEDICARE / Plan: MEDICARE PART A AND B / Product Type: *No Product type* /                     Patient  verified yes     Visit #   Current  / Total 7 18   Time   In / Out 10:10  am  11:00  am    Total Treatment Time 45    Total Timed Codes 40   1:1 Treatment Time 40      [facility] Totals Reminder:  bill using total billable   min of TIMED therapeutic procedures and modalities.   8-22 min = 1 unit; 23-37 min = 2 units; 38-52 min = 3 units; 53-67 min = 4 units; 68-82 min = 5 units            SUBJECTIVE    Pain Level (0-10 scale): 2 , stiffness.     Any medication changes, allergies to medications, adverse drug reactions, diagnosis change, or new procedure performed?: [x] No    [] Yes (see summary sheet for update)  Medications: Verified on Patient Summary List    Subjective functional status/changes:       Exercising at home.   Knee continues to feel stiff     OBJECTIVE      Therapeutic Procedures:  Tx Min Billable or 1:1 Min (if diff from Tx Min) Procedure, Rationale, Specifics   10  77103 Manual Therapy (timed):  decrease pain, increase ROM, and increase tissue extensibility to improve patient's ability to progress to PLOF and address remaining functional goals.  The manual therapy interventions were performed at a separate and distinct time from the therapeutic activities interventions . (see flow sheet as applicable)     Details if applicable:  STM, patella mobilization. Extension/flexion.     30  69556 Therapeutic Exercise (timed):  increase ROM, strength, coordination, balance, and proprioception to improve patient's ability to progress to PLOF and address remaining functional goals. (see flow sheet as applicable)     Details if applicable:           Details if

## 2024-03-01 ENCOUNTER — HOSPITAL ENCOUNTER (OUTPATIENT)
Facility: HOSPITAL | Age: 74
Setting detail: RECURRING SERIES
Discharge: HOME | End: 2024-03-04
Attending: ORTHOPAEDIC SURGERY
Payer: MEDICARE

## 2024-03-01 PROCEDURE — 97110 THERAPEUTIC EXERCISES: CPT | Performed by: PHYSICAL THERAPIST

## 2024-03-01 PROCEDURE — 97140 MANUAL THERAPY 1/> REGIONS: CPT | Performed by: PHYSICAL THERAPIST

## 2024-03-01 NOTE — PROGRESS NOTES
applicable)     Details if applicable:           Details if applicable:           Details if applicable:            Details if applicable:     40     Total Total     [x]  Patient Education billed concurrently with other procedures   [x] Review HEP    [] Progressed/Changed HEP, detail:  Medbridge:  [id number]                                                               Hip abduction with band                                                                Retro walking for extension.                                                                     Other Objective/Functional Measures      Gait:  + SPC:  significant decrease in step length.     Extension:  muscle guarding.   Extension: -10.    Passive flexion: 110 with effort/pain, edge of bed.       Pain Level at end of session (0-10 scale): 2      Assessment     Modest improvement in flexion over the past 2 weeks.  No changes in extension.  Muscle guarding is an obstacle.   Suggested patient decrease intensity of strengthening program to facilitate a decrease in inflammation.   Has 3 visits left per scrip.    Patient will continue to benefit from skilled PT / OT services to modify and progress therapeutic interventions, analyze and address functional mobility deficits, analyze and address ROM deficits, and analyze and address soft tissue restrictions to address functional deficits and attain remaining goals.    Progress toward goals / Updated goals:  []  See Progress Note/Recertification     Independent with HEP., progressing 1/16/2024  Able to extend R knee fully to 0 degrees, progressing 1/16/2024  Able to flex R knee to 90 degrees to aide with ambulation., progressing 1/23/2024   Long Term Goals: To be accomplished in 18 treatments.   Achieve R knee AROM 0-120 degrees.  Able to ambulate without assistive device community distances.  Able to negotiate stairs with reciprocal pattern.      PLAN  Yes  Continue plan of care  Re-Cert Due: 4/3/2024  [x]  Upgrade

## 2024-03-05 ENCOUNTER — HOSPITAL ENCOUNTER (OUTPATIENT)
Facility: HOSPITAL | Age: 74
Setting detail: RECURRING SERIES
Discharge: HOME | End: 2024-03-08
Attending: ORTHOPAEDIC SURGERY
Payer: MEDICARE

## 2024-03-05 PROCEDURE — 97110 THERAPEUTIC EXERCISES: CPT | Performed by: PHYSICAL THERAPIST

## 2024-03-05 PROCEDURE — 97140 MANUAL THERAPY 1/> REGIONS: CPT | Performed by: PHYSICAL THERAPIST

## 2024-03-05 NOTE — PROGRESS NOTES
PHYSICAL THERAPY - MEDICARE DAILY TREATMENT NOTE (updated 3/23)  0  Medical   Diagnosis:  Status post total right knee replacement [Z96.651] Treatment Diagnosis:  M25.561  RIGHT KNEE PAIN    Referral Source:  Talib Rebollar MD Insurance:   Payor: MEDICARE / Plan: MEDICARE PART A AND B / Product Type: *No Product type* /                     Patient  verified yes     Visit #   Current  / Total 16 18   Time   In / Out 100 pm  150 pm    Total Treatment Time 50 minutes    Total Timed Codes 40   1:1 Treatment Time 40      [facility] Totals Reminder:  bill using total billable   min of TIMED therapeutic procedures and modalities.   8-22 min = 1 unit; 23-37 min = 2 units; 38-52 min = 3 units; 53-67 min = 4 units; 68-82 min = 5 units            SUBJECTIVE    Pain Level (0-10 scale): 0  Any medication changes, allergies to medications, adverse drug reactions, diagnosis change, or new procedure performed?: [x] No    [] Yes (see summary sheet for update)  Medications: Verified on Patient Summary List    Subjective functional status/changes:       I am probably not stretching my knee hard enough at home       OBJECTIVE      Therapeutic Procedures:  Tx Min Billable or 1:1 Min (if diff from Tx Min) Procedure, Rationale, Specifics   15  31708 Manual Therapy (timed):  decrease pain, increase ROM, and increase tissue extensibility to improve patient's ability to progress to PLOF and address remaining functional goals.  The manual therapy interventions were performed at a separate and distinct time from the therapeutic activities interventions . (see flow sheet as applicable)     Details if applicable:  STM, patella mobilization. Extension/flexion.     25  61362 Therapeutic Exercise (timed):  increase ROM, strength, coordination, balance, and proprioception to improve patient's ability to progress to PLOF and address remaining functional goals. (see flow sheet as applicable)     Details if applicable:           Details if

## 2024-03-08 ENCOUNTER — HOSPITAL ENCOUNTER (OUTPATIENT)
Facility: HOSPITAL | Age: 74
Setting detail: RECURRING SERIES
Discharge: HOME | End: 2024-03-11
Attending: ORTHOPAEDIC SURGERY
Payer: MEDICARE

## 2024-03-08 PROCEDURE — 97140 MANUAL THERAPY 1/> REGIONS: CPT | Performed by: PHYSICAL THERAPIST

## 2024-03-08 PROCEDURE — 97110 THERAPEUTIC EXERCISES: CPT | Performed by: PHYSICAL THERAPIST

## 2024-03-08 NOTE — PROGRESS NOTES
[facility]  [address], [address]  [place] [address]  Phone: [phone]    Fax: [phone]    PHYSICAL THERAPY PROGRESS NOTE  Patient Name:  Clarissa Alonzo :  1950   Treatment/Medical Diagnosis: Status post total right knee replacement [Z96.651]   Referral Source:  Talib Rebollar MD     Date of Initial Visit:   Attended Visits:  17 Missed Visits:  0     SUMMARY OF TREATMENT/ASSESSMENT:    Subjective:  No pain .  \"Stiffness\"    Objective:      PROM :  -10 to 105    Gait:  ROM loss noted with both swing and mid stance.   No assistive device needed.       Impression:    Slow and steady progress.  Patient would benefit from additional sessions if you are in agreement.  The original script stated 18 visits.   Please advise and thank you for this referral.          RECOMMENDATIONS FOR SKILLED THERAPY      As per MD's request:      Continue PT_____________________________________  date________________    Discontinue PT___________________________________ date_________________        April Abhijit Page, PT       3/8/2024       3:06 PM    If you have any questions/comments please contact us directly at [phone].   Thank you for allowing us to assist in the care of your patient.    
Discharge due to :  []  Other:      April Abhijit Leonard, PT       3/8/2024       11:28 AM

## 2024-03-11 ENCOUNTER — HOSPITAL ENCOUNTER (OUTPATIENT)
Facility: HOSPITAL | Age: 74
Setting detail: RECURRING SERIES
Discharge: HOME | End: 2024-03-14
Attending: ORTHOPAEDIC SURGERY
Payer: MEDICARE

## 2024-03-11 PROCEDURE — 97110 THERAPEUTIC EXERCISES: CPT

## 2024-03-14 ENCOUNTER — HOSPITAL ENCOUNTER (OUTPATIENT)
Facility: HOSPITAL | Age: 74
Setting detail: RECURRING SERIES
Discharge: HOME | End: 2024-03-17
Attending: ORTHOPAEDIC SURGERY
Payer: MEDICARE

## 2024-03-14 PROCEDURE — 97110 THERAPEUTIC EXERCISES: CPT

## 2024-03-14 PROCEDURE — 97140 MANUAL THERAPY 1/> REGIONS: CPT

## 2024-03-14 NOTE — PROGRESS NOTES
PHYSICAL THERAPY - MEDICARE DAILY TREATMENT NOTE (updated 3/23)  0  Medical   Diagnosis:  Status post total right knee replacement [Z96.651] Treatment Diagnosis:  M25.561  RIGHT KNEE PAIN    Referral Source:  Talib Rebollar MD Insurance:   Payor: MEDICARE / Plan: MEDICARE PART A AND B / Product Type: *No Product type* /                     Patient  verified yes     Visit #   Current  / Total 17 18   Time   In / Out 10 1045   Total Treatment Time 45  minutes    Total Timed Codes 40   1:1 Treatment Time 40      [facility] Totals Reminder:  bill using total billable   min of TIMED therapeutic procedures and modalities.   8-22 min = 1 unit; 23-37 min = 2 units; 38-52 min = 3 units; 53-67 min = 4 units; 68-82 min = 5 units            SUBJECTIVE    Pain Level (0-10 scale): 1  Any medication changes, allergies to medications, adverse drug reactions, diagnosis change, or new procedure performed?: [x] No    [] Yes (see summary sheet for update)  Medications: Verified on Patient Summary List    Subjective functional status/changes:       \"Feeling more stiff today.\"    OBJECTIVE      Therapeutic Procedures:  Tx Min Billable or 1:1 Min (if diff from Tx Min) Procedure, Rationale, Specifics   10  27369 Manual Therapy (timed):  decrease pain, increase ROM, and increase tissue extensibility to improve patient's ability to progress to PLOF and address remaining functional goals.  The manual therapy interventions were performed at a separate and distinct time from the therapeutic activities interventions . (see flow sheet as applicable)     Details if applicable:  STM, patella mobilization. Extension/flexion.     30  06005 Therapeutic Exercise (timed):  increase ROM, strength, coordination, balance, and proprioception to improve patient's ability to progress to PLOF and address remaining functional goals. (see flow sheet as applicable)     Details if applicable:           Details if applicable:           Details if applicable:

## 2024-03-19 ENCOUNTER — HOSPITAL ENCOUNTER (OUTPATIENT)
Facility: HOSPITAL | Age: 74
Setting detail: RECURRING SERIES
Discharge: HOME | End: 2024-03-22
Attending: ORTHOPAEDIC SURGERY
Payer: MEDICARE

## 2024-03-19 PROCEDURE — 97140 MANUAL THERAPY 1/> REGIONS: CPT

## 2024-03-19 PROCEDURE — 97110 THERAPEUTIC EXERCISES: CPT

## 2024-03-19 NOTE — THERAPY RECERTIFICATION
[facility]  [address], [address]  [place] [address]  Phone: [phone]    Fax: [phone]    CONTINUED PLAN OF CARE/RECERTIFICATION FOR PHYSICAL THERAPY          Patient Name:              Clarissa Alonzo :  1950   Treatment/Medical Diagnosis:  Status post total right knee replacement [Z96.651]   Onset Date:  1/3/24    Referral Source:  Talib Rebollar MD Start of Care (SOC):  24   Prior Hospitalization:  See Medical History Provider #:  [id number]      Prior Level of Function (PLOF):  Independent with some R knee pain   Comorbidities:  Arthritis, osteoporosis, L TKR   Medications:  Verified on Patient Summary List   Visits from SOC:  18 Missed Visits:  0     Progress toward Goals:   Independent with HEP.,   Status at last Eval/Progress Note: met  Current Status: met  Goal Met?  yes    2.  Able to extend R knee fully to 0 degrees   Status at last Eval/Progress Note: progressing  Current Status: progressing  Goal Met?  no    3. Able to flex R knee to 90 degrees to aide with ambulation.   Status at last Eval/Progress Note: met  Current Status: met  Goal Met?  yes    4.  Achieve R knee AROM 0-120 degrees   Status at last Eval/Progress Note: progressing  Current Status: progressing  Goal Met?  no    5. Able to ambulate without assistive device community distances   Status at last Eval/Progress Note: met  Current Status: met  Goal Met?  yes    6. Able to negotiate stairs with reciprocal pattern   Status at last Eval/Progress Note: progressing  Current Status: proressing  Goal Met?  no    Key Functional Changes/Progress:    Extension -8 degrees with muscle guarding   Passive flexion:  110 degrees with effort/pain   Gait with decrease in step length on R knee   Difficulty with sleep due to pain    Problem List: pain affecting function, decrease ROM, decrease strength, edema affection function, impaired gait/balance, decrease ADL/functional abilities,

## 2024-03-19 NOTE — PROGRESS NOTES
Details if applicable:     40     Total Total     [x]  Patient Education billed concurrently with other procedures   [x] Review HEP    [] Progressed/Changed HEP, detail:  Medbridge:  [id number]                                                               Hip abduction with band                                                                Retro walking for extension.                                                                     Other Objective/Functional Measures      Gait:  + SPC:  significant decrease in step length.     Extension:  muscle guarding.   Extension: -8   Passive flexion: 110 with effort/pain, edge of bed.       Pain Level at end of session (0-10 scale): 0      Assessment   Pt continues to have difficulty with ROM, unable to achieve full extension. She continues to have difficulty with prolonged standing, and navigating stairs with step over step pattern. Pt unable to sleep throughout the night without waking up due to pain, medication has not been helpful.   Patient will continue to benefit from skilled PT / OT services to modify and progress therapeutic interventions, analyze and address functional mobility deficits, analyze and address ROM deficits, and analyze and address soft tissue restrictions to address functional deficits and attain remaining goals.    Progress toward goals / Updated goals:  []  See Progress Note/Recertification     Independent with HEP.,  MET 3/5/2024  Able to extend R knee fully to 0 degrees, plateaued 3/5/3025  Able to flex R knee to 90 degrees to aide with ambulation., MET 3/5/3034  Long Term Goals: To be accomplished in 18 treatments.   Achieve R knee AROM 0-120 degrees., not met 3/5/3024  ( 105 degrees)   Able to ambulate without assistive device community distances, MET 3/5/3034  Able to negotiate stairs with reciprocal pattern., NOT MET 3/5/3034  Normal gait on even surfaces without assistive device, progressing 3/8/2024      PLAN  Yes  Continue plan of

## 2024-03-22 ENCOUNTER — HOSPITAL ENCOUNTER (OUTPATIENT)
Facility: HOSPITAL | Age: 74
Setting detail: RECURRING SERIES
Discharge: HOME | End: 2024-03-25
Attending: ORTHOPAEDIC SURGERY
Payer: MEDICARE

## 2024-03-22 ENCOUNTER — APPOINTMENT (OUTPATIENT)
Facility: HOSPITAL | Age: 74
End: 2024-03-22
Attending: ORTHOPAEDIC SURGERY
Payer: MEDICARE

## 2024-03-22 PROCEDURE — 97110 THERAPEUTIC EXERCISES: CPT

## 2024-03-22 NOTE — PROGRESS NOTES
PHYSICAL THERAPY - MEDICARE DAILY TREATMENT NOTE (updated 3/23)  0  Medical   Diagnosis:  Status post total right knee replacement [Z96.651] Treatment Diagnosis:  M25.561  RIGHT KNEE PAIN    Referral Source:  Talib Rebollar MD Insurance:   Payor: MEDICARE / Plan: MEDICARE PART A AND B / Product Type: *No Product type* /                     Patient  verified yes     Visit #   Current  / Total 19 18+18   Time   In / Out 10 1045   Total Treatment Time 45  minutes    Total Timed Codes 40   1:1 Treatment Time 40      [facility] Totals Reminder:  bill using total billable   min of TIMED therapeutic procedures and modalities.   8-22 min = 1 unit; 23-37 min = 2 units; 38-52 min = 3 units; 53-67 min = 4 units; 68-82 min = 5 units            SUBJECTIVE    Pain Level (0-10 scale): 1  Any medication changes, allergies to medications, adverse drug reactions, diagnosis change, or new procedure performed?: [x] No    [] Yes (see summary sheet for update)  Medications: Verified on Patient Summary List    Subjective functional status/changes:       No new complaints    OBJECTIVE      Therapeutic Procedures:  Tx Min Billable or 1:1 Min (if diff from Tx Min) Procedure, Rationale, Specifics     41707 Manual Therapy (timed):  decrease pain, increase ROM, and increase tissue extensibility to improve patient's ability to progress to PLOF and address remaining functional goals.  The manual therapy interventions were performed at a separate and distinct time from the therapeutic activities interventions . (see flow sheet as applicable)     Details if applicable:  STM, patella mobilization. Extension/flexion.     40  32020 Therapeutic Exercise (timed):  increase ROM, strength, coordination, balance, and proprioception to improve patient's ability to progress to PLOF and address remaining functional goals. (see flow sheet as applicable)     Details if applicable:           Details if applicable:           Details if applicable:

## 2024-03-27 ENCOUNTER — HOSPITAL ENCOUNTER (OUTPATIENT)
Facility: HOSPITAL | Age: 74
Setting detail: RECURRING SERIES
Discharge: HOME | End: 2024-03-30
Attending: ORTHOPAEDIC SURGERY
Payer: MEDICARE

## 2024-03-27 PROCEDURE — 97110 THERAPEUTIC EXERCISES: CPT

## 2024-03-27 NOTE — PROGRESS NOTES
4/3/2024  [x]  Upgrade activities as tolerated  []  Discharge due to :  []  Other:      Marbin Osullivan Jr., PTA       3/27/2024       10:23 AM

## 2024-03-29 ENCOUNTER — HOSPITAL ENCOUNTER (OUTPATIENT)
Facility: HOSPITAL | Age: 74
Setting detail: RECURRING SERIES
Discharge: HOME | End: 2024-04-01
Attending: ORTHOPAEDIC SURGERY
Payer: MEDICARE

## 2024-03-29 PROCEDURE — 97110 THERAPEUTIC EXERCISES: CPT | Performed by: PHYSICAL THERAPIST

## 2024-03-29 PROCEDURE — 97140 MANUAL THERAPY 1/> REGIONS: CPT | Performed by: PHYSICAL THERAPIST

## 2024-03-29 NOTE — PROGRESS NOTES
without assistive device, progressing 3/8/2024      PLAN  Yes  Continue plan of care  Re-Cert Due: 4/3/2024  [x]  Upgrade activities as tolerated  []  Discharge due to :  []  Other:      Meliza Leonard, PT       3/29/2024       12:49 PM

## 2024-04-05 ENCOUNTER — HOSPITAL ENCOUNTER (OUTPATIENT)
Facility: HOSPITAL | Age: 74
Setting detail: RECURRING SERIES
Discharge: HOME | End: 2024-04-08
Attending: ORTHOPAEDIC SURGERY
Payer: MEDICARE

## 2024-04-05 PROCEDURE — 97110 THERAPEUTIC EXERCISES: CPT

## 2024-04-05 PROCEDURE — 97140 MANUAL THERAPY 1/> REGIONS: CPT

## 2024-04-05 NOTE — PROGRESS NOTES
PHYSICAL THERAPY - MEDICARE DAILY TREATMENT NOTE (updated 3/23)  0  Medical   Diagnosis:  Status post total right knee replacement [Z96.651] Treatment Diagnosis:  M25.561  RIGHT KNEE PAIN    Referral Source:  Talib Rebollar MD Insurance:   Payor: MEDICARE / Plan: MEDICARE PART A AND B / Product Type: *No Product type* /                     Patient  verified yes     Visit #   Current  / Total 22 18+18   Time   In / Out 1130 am  1215 pm    Total Treatment Time 45 minutes    Total Timed Codes 40    1:1 Treatment Time 40      [facility] Totals Reminder:  bill using total billable   min of TIMED therapeutic procedures and modalities.   8-22 min = 1 unit; 23-37 min = 2 units; 38-52 min = 3 units; 53-67 min = 4 units; 68-82 min = 5 units            SUBJECTIVE    Pain Level (0-10 scale): 1  Any medication changes, allergies to medications, adverse drug reactions, diagnosis change, or new procedure performed?: [x] No    [] Yes (see summary sheet for update)  Medications: Verified on Patient Summary List    Subjective functional status/changes:     Knee feels incredibly stiff today    OBJECTIVE      Therapeutic Procedures:  Tx Min Billable or 1:1 Min (if diff from Tx Min) Procedure, Rationale, Specifics   10  79133 Manual Therapy (timed):  decrease pain, increase ROM, and increase tissue extensibility to improve patient's ability to progress to PLOF and address remaining functional goals.  The manual therapy interventions were performed at a separate and distinct time from the therapeutic activities interventions . (see flow sheet as applicable)     Details if applicable:  STM, patella mobilization. Extension/flexion.     30  16949 Therapeutic Exercise (timed):  increase ROM, strength, coordination, balance, and proprioception to improve patient's ability to progress to PLOF and address remaining functional goals. (see flow sheet as applicable)     Details if applicable:           Details if applicable:           Details if

## 2024-04-08 ENCOUNTER — HOSPITAL ENCOUNTER (OUTPATIENT)
Facility: HOSPITAL | Age: 74
Setting detail: RECURRING SERIES
Discharge: HOME | End: 2024-04-11
Attending: ORTHOPAEDIC SURGERY
Payer: MEDICARE

## 2024-04-08 PROCEDURE — 97140 MANUAL THERAPY 1/> REGIONS: CPT

## 2024-04-08 PROCEDURE — 97110 THERAPEUTIC EXERCISES: CPT

## 2024-04-08 NOTE — PROGRESS NOTES
PHYSICAL THERAPY - MEDICARE DAILY TREATMENT NOTE (updated 3/23)  0  Medical   Diagnosis:  Status post total right knee replacement [Z96.651] Treatment Diagnosis:  M25.561  RIGHT KNEE PAIN    Referral Source:  Talib Rebollar MD Insurance:   Payor: MEDICARE / Plan: MEDICARE PART A AND B / Product Type: *No Product type* /                     Patient  verified yes     Visit #   Current  / Total 23 18+18   Time   In / Out 945 am  1030 pm    Total Treatment Time 45 minutes    Total Timed Codes 41   1:1 Treatment Time 41      [facility] Totals Reminder:  bill using total billable   min of TIMED therapeutic procedures and modalities.   8-22 min = 1 unit; 23-37 min = 2 units; 38-52 min = 3 units; 53-67 min = 4 units; 68-82 min = 5 units            SUBJECTIVE    Pain Level (0-10 scale): 1  Any medication changes, allergies to medications, adverse drug reactions, diagnosis change, or new procedure performed?: [x] No    [] Yes (see summary sheet for update)  Medications: Verified on Patient Summary List    Subjective functional status/changes:     Knee feels incredibly stiff today    OBJECTIVE      Therapeutic Procedures:  Tx Min Billable or 1:1 Min (if diff from Tx Min) Procedure, Rationale, Specifics   10  99595 Manual Therapy (timed):  decrease pain, increase ROM, and increase tissue extensibility to improve patient's ability to progress to PLOF and address remaining functional goals.  The manual therapy interventions were performed at a separate and distinct time from the therapeutic activities interventions . (see flow sheet as applicable)     Details if applicable:  STM, patella mobilization. Extension/flexion.     31  55858 Therapeutic Exercise (timed):  increase ROM, strength, coordination, balance, and proprioception to improve patient's ability to progress to PLOF and address remaining functional goals. (see flow sheet as applicable)     Details if applicable:           Details if applicable:           Details if

## 2024-04-15 ENCOUNTER — HOSPITAL ENCOUNTER (OUTPATIENT)
Facility: HOSPITAL | Age: 74
Setting detail: RECURRING SERIES
Discharge: HOME | End: 2024-04-18
Attending: ORTHOPAEDIC SURGERY
Payer: MEDICARE

## 2024-04-15 PROCEDURE — 97140 MANUAL THERAPY 1/> REGIONS: CPT

## 2024-04-15 PROCEDURE — 97110 THERAPEUTIC EXERCISES: CPT

## 2024-04-15 NOTE — PROGRESS NOTES
PHYSICAL THERAPY - MEDICARE DAILY TREATMENT NOTE (updated 3/23)  0  Medical   Diagnosis:  Status post total right knee replacement [Z96.651] Treatment Diagnosis:  M25.561  RIGHT KNEE PAIN    Referral Source:  Talib Rebollar MD Insurance:   Payor: MEDICARE / Plan: MEDICARE PART A AND B / Product Type: *No Product type* /                     Patient  verified yes     Visit #   Current  / Total 24 18+18   Time   In / Out 230 315 pm    Total Treatment Time 45 minutes    Total Timed Codes 38   1:1 Treatment Time 38      [facility] Totals Reminder:  bill using total billable   min of TIMED therapeutic procedures and modalities.   8-22 min = 1 unit; 23-37 min = 2 units; 38-52 min = 3 units; 53-67 min = 4 units; 68-82 min = 5 units            SUBJECTIVE    Pain Level (0-10 scale): 1  Any medication changes, allergies to medications, adverse drug reactions, diagnosis change, or new procedure performed?: [x] No    [] Yes (see summary sheet for update)  Medications: Verified on Patient Summary List    Subjective functional status/changes:     Knee feels incredibly stiff today    OBJECTIVE      Therapeutic Procedures:  Tx Min Billable or 1:1 Min (if diff from Tx Min) Procedure, Rationale, Specifics   10  12184 Manual Therapy (timed):  decrease pain, increase ROM, and increase tissue extensibility to improve patient's ability to progress to PLOF and address remaining functional goals.  The manual therapy interventions were performed at a separate and distinct time from the therapeutic activities interventions . (see flow sheet as applicable)     Details if applicable:  STM, patella mobilization. Extension/flexion.     28  09672 Therapeutic Exercise (timed):  increase ROM, strength, coordination, balance, and proprioception to improve patient's ability to progress to PLOF and address remaining functional goals. (see flow sheet as applicable)     Details if applicable:           Details if applicable:           Details if

## 2024-04-18 ENCOUNTER — HOSPITAL ENCOUNTER (OUTPATIENT)
Facility: HOSPITAL | Age: 74
Setting detail: RECURRING SERIES
Discharge: HOME | End: 2024-04-21
Attending: ORTHOPAEDIC SURGERY
Payer: MEDICARE

## 2024-04-18 PROCEDURE — 97140 MANUAL THERAPY 1/> REGIONS: CPT

## 2024-04-18 PROCEDURE — 97110 THERAPEUTIC EXERCISES: CPT

## 2024-04-18 NOTE — THERAPY DISCHARGE
[facility] Rodrig[facility]  [address], [address]  [place] [address]  Phone: [phone]    Fax: [phone]    DISCHARGE SUMMARY  Patient Name: Clarissa Alonzo : 1950   Treatment/Medical Diagnosis: Status post total right knee replacement [Z96.651]   Referral Source: Talib Rebollar MD     Date of Initial Visit: 24 Attended Visits: 25 Missed Visits:      SUMMARY OF TREATMENT  Reached limited of Medicare insurance coverage so DC PT    CURRENT STATUS      Short term goals:   Independent with HEP.,  MET 3/5/2024  Able to extend R knee fully to 0 degrees, AS OF 24 SHE IS 1 DEGREE SHY OF FULL EXT'N  Able to flex R knee to 90 degrees to aide with ambulation., MET PRONE FLEX 24 IS 93 DEGREES  Long Term Goals: To be accomplished in 18 treatments.   Achieve R knee AROM 0-120 degrees.( 105 degrees SEATED)  Able to ambulate without assistive device community distances, MET 3034  Able to negotiate stairs with reciprocal pattern., NOT MET 24  Normal gait on even surfaces without assistive device STILL WITH LIMP       RECOMMENDATIONS  Discontinue therapy. Progressing towards or have reached established goals.        René Julien, PT       2024       2:49 PM    If you have any questions/comments please contact us directly at [phone].   Thank you for allowing us to assist in the care of your patient.

## 2024-04-18 NOTE — PROGRESS NOTES
PHYSICAL THERAPY - MEDICARE DAILY TREATMENT NOTE (updated 3/23)  0  Medical   Diagnosis:  Status post total right knee replacement [Z96.651] Treatment Diagnosis:  M25.561  RIGHT KNEE PAIN    Referral Source:  Talib Rebollar MD Insurance:   Payor: MEDICARE / Plan: MEDICARE PART A AND B / Product Type: *No Product type* /                     Patient  verified yes     Visit #   Current  / Total 25 18+18   Time   In / Out 145 230   Total Treatment Time 45 minutes    Total Timed Codes 40   1:1 Treatment Time 40      [facility] Totals Reminder:  bill using total billable   min of TIMED therapeutic procedures and modalities.   8-22 min = 1 unit; 23-37 min = 2 units; 38-52 min = 3 units; 53-67 min = 4 units; 68-82 min = 5 units            SUBJECTIVE    Pain Level (0-10 scale): 1  Any medication changes, allergies to medications, adverse drug reactions, diagnosis change, or new procedure performed?: [x] No    [] Yes (see summary sheet for update)  Medications: Verified on Patient Summary List    Subjective functional status/changes:     This right TKR is not rehabing as easily as the left did.    OBJECTIVE      Therapeutic Procedures:  Tx Min Billable or 1:1 Min (if diff from Tx Min) Procedure, Rationale, Specifics   10  87387 Manual Therapy (timed):  decrease pain, increase ROM, and increase tissue extensibility to improve patient's ability to progress to PLOF and address remaining functional goals.  The manual therapy interventions were performed at a separate and distinct time from the therapeutic activities interventions . (see flow sheet as applicable)     Details if applicable:  STM, patella mobilization. Extension/flexion.     30  36282 Therapeutic Exercise (timed):  increase ROM, strength, coordination, balance, and proprioception to improve patient's ability to progress to PLOF and address remaining functional goals. (see flow sheet as applicable)     Details if applicable:           Details if applicable:

## 2024-04-19 ENCOUNTER — TELEPHONE (OUTPATIENT)
Age: 74
End: 2024-04-19

## 2024-04-19 NOTE — TELEPHONE ENCOUNTER
Pt had a RT TKR 01/03/2024    Pt states she still has some stiffness and she is not able to straighten her leg out. She also states she is walking with a limp but using no device to assist her.     She is wondering if another script for PT is needed or she should just continue with the home exercises.    She did state that she has reached her cap at PT.

## 2024-04-22 ENCOUNTER — APPOINTMENT (OUTPATIENT)
Facility: HOSPITAL | Age: 74
End: 2024-04-22
Attending: ORTHOPAEDIC SURGERY
Payer: MEDICARE

## 2024-04-24 ENCOUNTER — APPOINTMENT (OUTPATIENT)
Facility: HOSPITAL | Age: 74
End: 2024-04-24
Attending: ORTHOPAEDIC SURGERY
Payer: MEDICARE

## 2024-05-03 ENCOUNTER — TRANSCRIBE ORDERS (OUTPATIENT)
Facility: HOSPITAL | Age: 74
End: 2024-05-03

## 2024-05-03 DIAGNOSIS — Z12.31 SCREENING MAMMOGRAM, ENCOUNTER FOR: Primary | ICD-10-CM

## 2024-06-10 DIAGNOSIS — M25.561 RIGHT KNEE PAIN, UNSPECIFIED CHRONICITY: Primary | ICD-10-CM

## 2024-06-11 ENCOUNTER — HOSPITAL ENCOUNTER (OUTPATIENT)
Facility: HOSPITAL | Age: 74
Discharge: HOME | End: 2024-06-14
Payer: MEDICARE

## 2024-06-11 DIAGNOSIS — M25.561 RIGHT KNEE PAIN, UNSPECIFIED CHRONICITY: ICD-10-CM

## 2024-06-11 PROCEDURE — 73562 X-RAY EXAM OF KNEE 3: CPT

## 2024-06-13 ENCOUNTER — OFFICE VISIT (OUTPATIENT)
Age: 74
End: 2024-06-13
Payer: MEDICARE

## 2024-06-13 DIAGNOSIS — M25.561 CHRONIC PAIN OF RIGHT KNEE: Primary | ICD-10-CM

## 2024-06-13 DIAGNOSIS — G89.29 CHRONIC PAIN OF RIGHT KNEE: Primary | ICD-10-CM

## 2024-06-13 PROCEDURE — G8399 PT W/DXA RESULTS DOCUMENT: HCPCS | Performed by: ORTHOPAEDIC SURGERY

## 2024-06-13 PROCEDURE — 1090F PRES/ABSN URINE INCON ASSESS: CPT | Performed by: ORTHOPAEDIC SURGERY

## 2024-06-13 PROCEDURE — 3017F COLORECTAL CA SCREEN DOC REV: CPT | Performed by: ORTHOPAEDIC SURGERY

## 2024-06-13 PROCEDURE — 1123F ACP DISCUSS/DSCN MKR DOCD: CPT | Performed by: ORTHOPAEDIC SURGERY

## 2024-06-13 PROCEDURE — 99213 OFFICE O/P EST LOW 20 MIN: CPT | Performed by: ORTHOPAEDIC SURGERY

## 2024-06-13 PROCEDURE — 1036F TOBACCO NON-USER: CPT | Performed by: ORTHOPAEDIC SURGERY

## 2024-06-13 PROCEDURE — G8421 BMI NOT CALCULATED: HCPCS | Performed by: ORTHOPAEDIC SURGERY

## 2024-06-13 PROCEDURE — G8427 DOCREV CUR MEDS BY ELIG CLIN: HCPCS | Performed by: ORTHOPAEDIC SURGERY

## 2024-06-13 RX ORDER — DOXEPIN HYDROCHLORIDE 10 MG/1
CAPSULE ORAL
COMMUNITY
Start: 2024-03-18

## 2024-06-13 NOTE — PROGRESS NOTES
Name: Clarissa Alonzo    : 1950     [facility]ICINE  [address], [address]  [facility] [address]  Dept: [phone]  Dept Fax: [phone]     Chief Complaint   Patient presents with    Knee Pain     Right        There were no vitals taken for this visit.     Allergies   Allergen Reactions    Cimetidine Hives    Codeine Hives    Hydrocodone Hives    Oxycodone Hives    Cyanoacrylate Itching and Rash    Famotidine Rash    Midazolam Nausea And Vomiting    Omeprazole Hives and Rash    Sucralose Rash        Current Outpatient Medications   Medication Sig Dispense Refill    doxepin (SINEQUAN) 10 MG capsule       meloxicam (MOBIC) 15 MG tablet TAKE ONE TABLET BY MOUTH EVERY DAY as needed for pain      rosuvastatin (CRESTOR) 10 MG tablet Take 10 mg by mouth nightly       No current facility-administered medications for this visit.      Patient Active Problem List   Diagnosis    Posterior tibial tendon dysfunction, right    Macromastia    OA (osteoarthritis) of knee      Family History   Problem Relation Age of Onset    Breast Cancer Mother     Ovarian Cancer Mother     Anesth Problems Mother         Vomiting after anesthesia    Cancer Mother         Bladder ca, breast ca, ovarian cs    Diabetes Mother     Kidney Cancer Father     Cancer Father         Ca of kidney, lung    Diabetes Brother         Nephritis, CHF, Cardiomegaly    Dislocations Brother         Shoulder dislocations       Past Surgical History:   Procedure Laterality Date    ABDOMEN SURGERY      Gall bladder removed, breast reduction     BREAST BIOPSY Right     BREAST REDUCTION SURGERY Bilateral 2020    BILATERAL BREAST REDUCTION performed by Thai Keenan MD at [facility] MAIN OR    BREAST SURGERY Right     Biopsy - negative    CHOLECYSTECTOMY      HEENT Bilateral     cataracts    JOINT REPLACEMENT  3/17/22    [facility]    ORTHOPEDIC SURGERY Left

## 2024-06-19 ENCOUNTER — HOSPITAL ENCOUNTER (OUTPATIENT)
Facility: HOSPITAL | Age: 74
Setting detail: RECURRING SERIES
Discharge: HOME | End: 2024-06-22
Attending: ORTHOPAEDIC SURGERY
Payer: MEDICARE

## 2024-06-19 PROCEDURE — 97161 PT EVAL LOW COMPLEX 20 MIN: CPT

## 2024-06-19 NOTE — THERAPY EVALUATION
[facility] Rodrigues [facility]  [address], [address]  [place] [address]  Phone: [phone]    Fax: [phone]         PHYSICAL THERAPY - MEDICARE EVALUATION/PLAN OF CARE NOTE (updated 3/23)      Date: 2024          Patient Name:  Clarissa Alonzo :  1950   Medical   Diagnosis:  Status post total right knee replacement [Z96.651] Treatment Diagnosis:  M25.561  RIGHT KNEE PAIN    Referral Source:  Talib Rebollar MD Provider #:  [id number]                Insurance: Payor: MEDICARE / Plan: MEDICARE PART A AND B / Product Type: *No Product type* /      Patient  verified yes     Visit #   Current  / Total 1 24   Time   In / Out 845 930   Total Treatment Time 45   Total Timed Codes 0   1:1 Treatment Time 45      [facility] Totals Reminder:  bill using total billable   min of TIMED therapeutic procedures and modalities.   8-22 min = 1 unit; 23-37 min = 2 units; 38-52 min = 3 units;  53-67 min = 4 units; 68-82 min = 5 units           SUBJECTIVE  Pain Level (0-10 scale): 2  []constant []intermittent []improving []worsening []no change since onset    Any medication changes, allergies to medications, adverse drug reactions, diagnosis change, or new procedure performed?: [x] No    [] Yes (see summary sheet for update)  Medications: Verified on Patient Summary List    Subjective functional status/changes:     Not satisfied that tight TKR of 24 not going straight.  Ortho worked it all up and said it was fine on the inside.  Ortho ordered a new knee brace that I am to get     Start of Care: 2024  Onset Date: 24 TKR  Current symptoms/Complaints: knee not straight enough so slow big-limp walking  Mechanism of Injury: TKR/OA  PLOF: walked wfl  Limitations to PLOF/Activity or Recreational Limitations: na  Work Hx: retired nurse  Living Situation:   Mobility: big limp. Poor right knee extension poor flexion while walking  Self Care: wfl  Previous

## 2024-06-21 ENCOUNTER — HOSPITAL ENCOUNTER (OUTPATIENT)
Facility: HOSPITAL | Age: 74
Setting detail: RECURRING SERIES
Discharge: HOME | End: 2024-06-24
Attending: ORTHOPAEDIC SURGERY
Payer: MEDICARE

## 2024-06-21 PROCEDURE — 97140 MANUAL THERAPY 1/> REGIONS: CPT

## 2024-06-21 PROCEDURE — 97110 THERAPEUTIC EXERCISES: CPT

## 2024-06-21 NOTE — PROGRESS NOTES
PHYSICAL THERAPY - MEDICARE DAILY TREATMENT NOTE (updated 3/23)      Date: 2024          Patient Name:  Clarissa Alonzo :  1950   Medical   Diagnosis:  Status post total right knee replacement [Z96.651] Treatment Diagnosis:  M25.561  RIGHT KNEE PAIN    Referral Source:  Talib Rebollar MD Insurance:   Payor: MEDICARE / Plan: MEDICARE PART A AND B / Product Type: *No Product type* /                     Patient  verified yes     Visit #   Current  / Total 2 24   Time   In / Out 1100 1200   Total Treatment Time 60   Total Timed Codes 45   1:1 Treatment Time 45      [facility] Totals Reminder:  bill using total billable   min of TIMED therapeutic procedures and modalities.   8-22 min = 1 unit; 23-37 min = 2 units; 38-52 min = 3 units; 53-67 min = 4 units; 68-82 min = 5 units            SUBJECTIVE    Pain Level (0-10 scale): 2    Any medication changes, allergies to medications, adverse drug reactions, diagnosis change, or new procedure performed?: [x] No    [] Yes (see summary sheet for update)  Medications: Verified on Patient Summary List    Subjective functional status/changes:     Just stiffness.  Getting Dynasplint today.    OBJECTIVE      Therapeutic Procedures:  Tx Min Billable or 1:1 Min (if diff from Tx Min) Procedure, Rationale, Specifics   30  92333 Therapeutic Exercise (timed):  increase ROM, strength, coordination, balance, and proprioception to improve patient's ability to progress to PLOF and address remaining functional goals. (see flow sheet as applicable)     Details if applicable:     15  06770 Manual Therapy (timed):  decrease pain, increase ROM, and increase tissue extensibility to improve patient's ability to progress to PLOF and address remaining functional goals.  The manual therapy interventions were performed at a separate and distinct time from the therapeutic activities interventions . (see flow sheet as applicable)     Details if applicable:  PROM/STM for extension and flexion R

## 2024-06-24 ENCOUNTER — HOSPITAL ENCOUNTER (OUTPATIENT)
Facility: HOSPITAL | Age: 74
Setting detail: RECURRING SERIES
Discharge: HOME | End: 2024-06-27
Attending: ORTHOPAEDIC SURGERY
Payer: MEDICARE

## 2024-06-24 PROCEDURE — 97110 THERAPEUTIC EXERCISES: CPT

## 2024-06-24 PROCEDURE — 97140 MANUAL THERAPY 1/> REGIONS: CPT

## 2024-06-24 NOTE — PROGRESS NOTES
PHYSICAL THERAPY - MEDICARE DAILY TREATMENT NOTE (updated 3/23)      Date: 2024          Patient Name:  Clarissa Alonzo :  1950   Medical   Diagnosis:  Status post total right knee replacement [Z96.651] Treatment Diagnosis:  M25.561  RIGHT KNEE PAIN    Referral Source:  Talib Rebollar MD Insurance:   Payor: MEDICARE / Plan: MEDICARE PART A AND B / Product Type: *No Product type* /                     Patient  verified yes     Visit #   Current  / Total 3 24   Time   In / Out 1030 1115   Total Treatment Time 45   Total Timed Codes 40   1:1 Treatment Time 40      [facility] Totals Reminder:  bill using total billable   min of TIMED therapeutic procedures and modalities.   8-22 min = 1 unit; 23-37 min = 2 units; 38-52 min = 3 units; 53-67 min = 4 units; 68-82 min = 5 units            SUBJECTIVE    Pain Level (0-10 scale): 1    Any medication changes, allergies to medications, adverse drug reactions, diagnosis change, or new procedure performed?: [x] No    [] Yes (see summary sheet for update)  Medications: Verified on Patient Summary List    Subjective functional status/changes:     Slept in the dynasplint the entire night, no issues.     OBJECTIVE      Therapeutic Procedures:  Tx Min Billable or 1:1 Min (if diff from Tx Min) Procedure, Rationale, Specifics   25  67977 Therapeutic Exercise (timed):  increase ROM, strength, coordination, balance, and proprioception to improve patient's ability to progress to PLOF and address remaining functional goals. (see flow sheet as applicable)     Details if applicable:     15  27734 Manual Therapy (timed):  decrease pain, increase ROM, and increase tissue extensibility to improve patient's ability to progress to PLOF and address remaining functional goals.  The manual therapy interventions were performed at a separate and distinct time from the therapeutic activities interventions . (see flow sheet as applicable)     Details if applicable:  PROM/STM for extension and 
I have personally seen and examined this patient.  I have fully participated in the care of this patient. I have reviewed all pertinent clinical information, including history, physical exam, plan and the Resident’s note and agree except as noted.

## 2024-06-26 ENCOUNTER — HOSPITAL ENCOUNTER (OUTPATIENT)
Facility: HOSPITAL | Age: 74
Setting detail: RECURRING SERIES
Discharge: HOME | End: 2024-06-29
Attending: ORTHOPAEDIC SURGERY
Payer: MEDICARE

## 2024-06-26 PROCEDURE — 97140 MANUAL THERAPY 1/> REGIONS: CPT | Performed by: PHYSICAL THERAPIST

## 2024-06-26 PROCEDURE — 97110 THERAPEUTIC EXERCISES: CPT | Performed by: PHYSICAL THERAPIST

## 2024-06-26 NOTE — PROGRESS NOTES
PHYSICAL THERAPY - MEDICARE DAILY TREATMENT NOTE (updated 3/23)      Date: 2024          Patient Name:  Clarissa Alonzo :  1950   Medical   Diagnosis:  Status post total right knee replacement [Z96.651] Treatment Diagnosis:  M25.561  RIGHT KNEE PAIN    Referral Source:  Talib Rebollar MD Insurance:   Payor: MEDICARE / Plan: MEDICARE PART A AND B / Product Type: *No Product type* /                     Patient  verified yes     Visit #   Current  / Total 4 24   Time   In / Out 900 am  950 am    Total Treatment Time 50 minutes.    Total Timed Codes 45    1:1 Treatment Time 45      [facility] Totals Reminder:  bill using total billable   min of TIMED therapeutic procedures and modalities.   8-22 min = 1 unit; 23-37 min = 2 units; 38-52 min = 3 units; 53-67 min = 4 units; 68-82 min = 5 units            SUBJECTIVE    Pain Level (0-10 scale):     Any medication changes, allergies to medications, adverse drug reactions, diagnosis change, or new procedure performed?: [x] No    [] Yes (see summary sheet for update)  Medications: Verified on Patient Summary List    Subjective functional status/changes:       Patient continues to comfortable in the dynasplint.  Does not really feel a stretch.       OBJECTIVE      Therapeutic Procedures:  Tx Min Billable or 1:1 Min (if diff from Tx Min) Procedure, Rationale, Specifics   30  91029 Therapeutic Exercise (timed):  increase ROM, strength, coordination, balance, and proprioception to improve patient's ability to progress to PLOF and address remaining functional goals. (see flow sheet as applicable)     Details if applicable:     15  21569 Manual Therapy (timed):  decrease pain, increase ROM, and increase tissue extensibility to improve patient's ability to progress to PLOF and address remaining functional goals.  The manual therapy interventions were performed at a separate and distinct time from the therapeutic activities interventions . (see flow sheet as applicable)

## 2024-07-02 ENCOUNTER — HOSPITAL ENCOUNTER (OUTPATIENT)
Facility: HOSPITAL | Age: 74
Discharge: HOME | End: 2024-07-05
Payer: MEDICARE

## 2024-07-02 DIAGNOSIS — Z12.31 SCREENING MAMMOGRAM, ENCOUNTER FOR: ICD-10-CM

## 2024-07-02 PROCEDURE — 77063 BREAST TOMOSYNTHESIS BI: CPT

## 2024-07-09 ENCOUNTER — HOSPITAL ENCOUNTER (OUTPATIENT)
Facility: HOSPITAL | Age: 74
Setting detail: RECURRING SERIES
Discharge: HOME | End: 2024-07-12
Attending: ORTHOPAEDIC SURGERY
Payer: MEDICARE

## 2024-07-09 PROCEDURE — 97140 MANUAL THERAPY 1/> REGIONS: CPT

## 2024-07-09 PROCEDURE — 97110 THERAPEUTIC EXERCISES: CPT

## 2024-07-09 NOTE — PROGRESS NOTES
PHYSICAL THERAPY - MEDICARE DAILY TREATMENT NOTE (updated 3/23)      Date: 2024          Patient Name:  Clarissa Alonzo :  1950   Medical   Diagnosis:  Status post total right knee replacement [Z96.651] Treatment Diagnosis:  M25.561  RIGHT KNEE PAIN    Referral Source:  Talib Rebollar MD Insurance:   Payor: MEDICARE / Plan: MEDICARE PART A AND B / Product Type: *No Product type* /                     Patient  verified yes     Visit #   Current  / Total 6 24   Time   In / Out 930 am  1015 am    Total Treatment Time 45 minutes.    Total Timed Codes 38    1:1 Treatment Time 38      [facility] Totals Reminder:  bill using total billable   min of TIMED therapeutic procedures and modalities.   8-22 min = 1 unit; 23-37 min = 2 units; 38-52 min = 3 units; 53-67 min = 4 units; 68-82 min = 5 units            SUBJECTIVE    Pain Level (0-10 scale): 1    Any medication changes, allergies to medications, adverse drug reactions, diagnosis change, or new procedure performed?: [x] No    [] Yes (see summary sheet for update)  Medications: Verified on Patient Summary List    Subjective functional status/changes:       Patient continues to comfortable in the dynasplint.  Does not really feel a stretch.       OBJECTIVE      Therapeutic Procedures:  Tx Min Billable or 1:1 Min (if diff from Tx Min) Procedure, Rationale, Specifics   28  63074 Therapeutic Exercise (timed):  increase ROM, strength, coordination, balance, and proprioception to improve patient's ability to progress to PLOF and address remaining functional goals. (see flow sheet as applicable)     Details if applicable:     10  00712 Manual Therapy (timed):  decrease pain, increase ROM, and increase tissue extensibility to improve patient's ability to progress to PLOF and address remaining functional goals.  The manual therapy interventions were performed at a separate and distinct time from the therapeutic activities interventions . (see flow sheet as applicable)

## 2024-07-15 ENCOUNTER — HOSPITAL ENCOUNTER (OUTPATIENT)
Facility: HOSPITAL | Age: 74
Setting detail: RECURRING SERIES
Discharge: HOME | End: 2024-07-18
Attending: ORTHOPAEDIC SURGERY
Payer: MEDICARE

## 2024-07-15 PROCEDURE — 97140 MANUAL THERAPY 1/> REGIONS: CPT

## 2024-07-15 PROCEDURE — 97110 THERAPEUTIC EXERCISES: CPT

## 2024-07-15 NOTE — THERAPY DISCHARGE
[facility] Rodrigues [facility]  [address], [address]  [place] [address]  Phone: [phone]    Fax: [phone]    DISCHARGE SUMMARY  Patient Name: Clarissa Alonzo : 1950   Treatment/Medical Diagnosis: Status post total right knee replacement [Z96.651]   Referral Source: Talib Rebollar MD     Date of Initial Visit: 24 Attended Visits: 7 Missed Visits:      SUMMARY OF TREATMENT/CURRENT STATUS  Wearing brace at home.  Short Term Goals: To be accomplished in 12 treatments.  KNEE EXT'N RIGHT TKR TO FULL EXT'N not met 7/15/24  Long Term Goals: To be accomplished in 24 treatments.  NORMAL WALKING progressing 7/15/24     RECOMMENDATIONS  DC PT: Medicare cap.        René Julien, PT       7/15/2024       1:31 PM    If you have any questions/comments please contact us directly at [phone].   Thank you for allowing us to assist in the care of your patient.

## 2024-07-15 NOTE — PROGRESS NOTES
PHYSICAL THERAPY - MEDICARE DAILY TREATMENT NOTE (updated 3/23)      Date: 7/15/2024          Patient Name:  Clarissa Alonzo :  1950   Medical   Diagnosis:  Status post total right knee replacement [Z96.651] Treatment Diagnosis:  M25.561  RIGHT KNEE PAIN    Referral Source:  Talib Rebollar MD Insurance:   Payor: MEDICARE / Plan: MEDICARE PART A AND B / Product Type: *No Product type* /                     Patient  verified yes     Visit #   Current  / Total 7 24   Time   In / Out 955 am  1030 am    Total Treatment Time 35 minutes.    Total Timed Codes 33    1:1 Treatment Time 33      [facility] Totals Reminder:  bill using total billable   min of TIMED therapeutic procedures and modalities.   8-22 min = 1 unit; 23-37 min = 2 units; 38-52 min = 3 units; 53-67 min = 4 units; 68-82 min = 5 units            SUBJECTIVE    Pain Level (0-10 scale): 1    Any medication changes, allergies to medications, adverse drug reactions, diagnosis change, or new procedure performed?: [x] No    [] Yes (see summary sheet for update)  Medications: Verified on Patient Summary List    Subjective functional status/changes:       Patient continues to comfortable in the dynasplint.  Does not really feel a stretch.       OBJECTIVE      Therapeutic Procedures:  Tx Min Billable or 1:1 Min (if diff from Tx Min) Procedure, Rationale, Specifics   23  31808 Therapeutic Exercise (timed):  increase ROM, strength, coordination, balance, and proprioception to improve patient's ability to progress to PLOF and address remaining functional goals. (see flow sheet as applicable)     Details if applicable:     10  65653 Manual Therapy (timed):  decrease pain, increase ROM, and increase tissue extensibility to improve patient's ability to progress to PLOF and address remaining functional goals.  The manual therapy interventions were performed at a separate and distinct time from the therapeutic activities interventions . (see flow sheet as applicable)

## 2024-07-22 ENCOUNTER — APPOINTMENT (OUTPATIENT)
Facility: HOSPITAL | Age: 74
End: 2024-07-22
Attending: ORTHOPAEDIC SURGERY
Payer: MEDICARE

## 2024-07-29 ENCOUNTER — APPOINTMENT (OUTPATIENT)
Facility: HOSPITAL | Age: 74
End: 2024-07-29
Attending: ORTHOPAEDIC SURGERY
Payer: MEDICARE

## 2025-02-25 ENCOUNTER — TELEPHONE (OUTPATIENT)
Age: 75
End: 2025-02-25

## 2025-02-25 DIAGNOSIS — Z96.651 STATUS POST TOTAL RIGHT KNEE REPLACEMENT: Primary | ICD-10-CM

## 2025-02-25 RX ORDER — CLINDAMYCIN HYDROCHLORIDE 300 MG/1
600 CAPSULE ORAL ONCE
Qty: 2 CAPSULE | Refills: 3 | Status: SHIPPED | OUTPATIENT
Start: 2025-02-25 | End: 2025-02-25

## 2025-02-25 NOTE — TELEPHONE ENCOUNTER
Patient called in requesting antibiotics for dental procedure.      Surgery: RT TKR 01/03/2024       Pharmacy:[facility].   [address]. [facility] [address]

## 2025-03-19 ENCOUNTER — HOSPITAL ENCOUNTER (OUTPATIENT)
Facility: HOSPITAL | Age: 75
Setting detail: RECURRING SERIES
Discharge: HOME | End: 2025-03-22
Payer: MEDICARE

## 2025-03-19 PROCEDURE — 97110 THERAPEUTIC EXERCISES: CPT

## 2025-03-19 PROCEDURE — 97161 PT EVAL LOW COMPLEX 20 MIN: CPT

## 2025-03-19 NOTE — THERAPY EVALUATION
Star Rodrigues [facility]  [address], [address]  [place] [address]  Phone: [phone]    Fax: [phone]         PHYSICAL THERAPY - MEDICARE EVALUATION/PLAN OF CARE NOTE (updated 3/23)      Date: 3/19/2025          Patient Name:  Clarissa Alonzo :  1950   Medical   Diagnosis:  No admission diagnoses are documented for this encounter. Treatment Diagnosis:  M25.561  RIGHT KNEE PAIN    Referral Source:  Hilario Randall MD Provider #:  [id number]                Insurance: Payor: MEDICARE / Plan: MEDICARE PART A AND B / Product Type: *No Product type* /      Patient  verified yes     Visit #   Current  / Total 1 24   Time   In / Out 930 1030   Total Treatment Time 60   Total Timed Codes 15   1:1 Treatment Time 60      [facility] Totals Reminder:  bill using total billable   min of TIMED therapeutic procedures and modalities.   8-22 min = 1 unit; 23-37 min = 2 units; 38-52 min = 3 units;  53-67 min = 4 units; 68-82 min = 5 units           SUBJECTIVE  Pain Level (0-10 scale): 2  []constant []intermittent []improving []worsening []no change since onset    Any medication changes, allergies to medications, adverse drug reactions, diagnosis change, or new procedure performed?: [x] No    [] Yes (see summary sheet for update)  Medications: Verified on Patient Summary List    Subjective functional status/changes:     Doc checking my TKR and found it to be loose. Said try PT instead of revision.  Hurts behind knee/hamstrings.  I have a knee brace and an ankle brace.  I have A.D. but I prefer not to use it.    Start of Care: 3/19/2025  Onset Date: tkr right 24  Current symptoms/Complaints: as above  Mechanism of Injury: TKR a year ago  PLOF: wfl  Limitations to PLOF/Activity or Recreational Limitations: na  Work Hx: retired RN  Living Situation: spouse  Mobility: limited speed and endurance. Trouble with stairs/curbs/uneven ground  Self Care: wfl  Previous

## 2025-03-24 ENCOUNTER — HOSPITAL ENCOUNTER (OUTPATIENT)
Facility: HOSPITAL | Age: 75
Setting detail: RECURRING SERIES
Discharge: HOME | End: 2025-03-27
Payer: MEDICARE

## 2025-03-24 PROCEDURE — 97140 MANUAL THERAPY 1/> REGIONS: CPT

## 2025-03-24 PROCEDURE — 97110 THERAPEUTIC EXERCISES: CPT

## 2025-03-24 NOTE — PROGRESS NOTES
PHYSICAL THERAPY - MEDICARE DAILY TREATMENT NOTE (updated 3/23)      Date: 3/24/2025          Patient Name:  Clarissa Alonzo :  1950   Medical   Diagnosis:  Pain in right knee [M25.561] Treatment Diagnosis:  M25.561  RIGHT KNEE PAIN    Referral Source:  Hilario Randall MD Insurance:   Payor: MEDICARE / Plan: MEDICARE PART A AND B / Product Type: *No Product type* /                     Patient  verified yes     Visit #   Current  / Total 2 24   Time   In / Out 105 146   Total Treatment Time 41   Total Timed Codes 41   1:1 Treatment Time 41      [facility] Totals Reminder:  bill using total billable   min of TIMED therapeutic procedures and modalities.   8-22 min = 1 unit; 23-37 min = 2 units; 38-52 min = 3 units; 53-67 min = 4 units; 68-82 min = 5 units            SUBJECTIVE    Pain Level (0-10 scale): 3    Any medication changes, allergies to medications, adverse drug reactions, diagnosis change, or new procedure performed?: [x] No    [] Yes (see summary sheet for update)  Medications: Verified on Patient Summary List    Subjective functional status/changes:     \"Ok\"     OBJECTIVE      Therapeutic Procedures:  Tx Min Billable or 1:1 Min (if diff from Tx Min) Procedure, Rationale, Specifics   31  15445 Therapeutic Exercise (timed):  increase ROM, strength, coordination, balance, and proprioception to improve patient's ability to progress to PLOF and address remaining functional goals. (see flow sheet as applicable)     Details if applicable:     10  91105 Manual Therapy (timed):  decrease pain, increase ROM, increase tissue extensibility, and decrease trigger points to improve patient's ability to progress to PLOF and address remaining functional goals.  The manual therapy interventions were performed at a separate and distinct time from the therapeutic activities interventions . (see flow sheet as applicable)     Details if applicable:           Details if applicable:           Details if applicable:

## 2025-03-26 ENCOUNTER — HOSPITAL ENCOUNTER (OUTPATIENT)
Facility: HOSPITAL | Age: 75
Setting detail: RECURRING SERIES
Discharge: HOME | End: 2025-03-29
Payer: MEDICARE

## 2025-03-26 PROCEDURE — 97110 THERAPEUTIC EXERCISES: CPT

## 2025-03-26 NOTE — PROGRESS NOTES
PHYSICAL THERAPY - MEDICARE DAILY TREATMENT NOTE (updated 3/23)      Date: 3/26/2025          Patient Name:  Clarissa Alonzo :  1950   Medical   Diagnosis:  Pain in right knee [M25.561] Treatment Diagnosis:  M25.561  RIGHT KNEE PAIN    Referral Source:  Hilario Randall MD Insurance:   Payor: MEDICARE / Plan: MEDICARE PART A AND B / Product Type: *No Product type* /                     Patient  verified yes     Visit #   Current  / Total 3 24   Time   In / Out 145 230   Total Treatment Time 45   Total Timed Codes 40   1:1 Treatment Time 40      [facility] Totals Reminder:  bill using total billable   min of TIMED therapeutic procedures and modalities.   8-22 min = 1 unit; 23-37 min = 2 units; 38-52 min = 3 units; 53-67 min = 4 units; 68-82 min = 5 units            SUBJECTIVE    Pain Level (0-10 scale): 3    Any medication changes, allergies to medications, adverse drug reactions, diagnosis change, or new procedure performed?: [x] No    [] Yes (see summary sheet for update)  Medications: Verified on Patient Summary List    Subjective functional status/changes:     \"Knee bothered me a little when I was walking to [employer], but its okay at the moment\"    OBJECTIVE      Therapeutic Procedures:  Tx Min Billable or 1:1 Min (if diff from Tx Min) Procedure, Rationale, Specifics   40  73690 Therapeutic Exercise (timed):  increase ROM, strength, coordination, balance, and proprioception to improve patient's ability to progress to PLOF and address remaining functional goals. (see flow sheet as applicable)     Details if applicable:       21063 Manual Therapy (timed):  decrease pain, increase ROM, increase tissue extensibility, and decrease trigger points to improve patient's ability to progress to PLOF and address remaining functional goals.  The manual therapy interventions were performed at a separate and distinct time from the therapeutic activities interventions . (see flow sheet as applicable)     Details if

## 2025-04-01 ENCOUNTER — HOSPITAL ENCOUNTER (OUTPATIENT)
Facility: HOSPITAL | Age: 75
Setting detail: RECURRING SERIES
Discharge: HOME | End: 2025-04-04
Payer: MEDICARE

## 2025-04-01 PROCEDURE — 97110 THERAPEUTIC EXERCISES: CPT

## 2025-04-01 NOTE — PROGRESS NOTES
PHYSICAL THERAPY - MEDICARE DAILY TREATMENT NOTE (updated 3/23)      Date: 2025          Patient Name:  Clarissa Alonzo :  1950   Medical   Diagnosis:  Pain in right knee [M25.561] Treatment Diagnosis:  M25.561  RIGHT KNEE PAIN    Referral Source:  Hilario Randall MD Insurance:   Payor: MEDICARE / Plan: MEDICARE PART A AND B / Product Type: *No Product type* /                     Patient  verified yes     Visit #   Current  / Total 4 24   Time   In / Out 10:15 11   Total Treatment Time 45   Total Timed Codes 41   1:1 Treatment Time 41      [facility] Totals Reminder:  bill using total billable   min of TIMED therapeutic procedures and modalities.   8-22 min = 1 unit; 23-37 min = 2 units; 38-52 min = 3 units; 53-67 min = 4 units; 68-82 min = 5 units            SUBJECTIVE    Pain Level (0-10 scale): 1    Any medication changes, allergies to medications, adverse drug reactions, diagnosis change, or new procedure performed?: [x] No    [] Yes (see summary sheet for update)  Medications: Verified on Patient Summary List    Subjective functional status/changes:     No new complaints.     OBJECTIVE      Therapeutic Procedures:  Tx Min Billable or 1:1 Min (if diff from Tx Min) Procedure, Rationale, Specifics   35  36417 Therapeutic Exercise (timed):  increase ROM, strength, coordination, balance, and proprioception to improve patient's ability to progress to PLOF and address remaining functional goals. (see flow sheet as applicable)     Details if applicable:     6  79168 Manual Therapy (timed):  decrease pain, increase ROM, increase tissue extensibility, and decrease trigger points to improve patient's ability to progress to PLOF and address remaining functional goals.  The manual therapy interventions were performed at a separate and distinct time from the therapeutic activities interventions . (see flow sheet as applicable)     Details if applicable:  HS stretch, supine         Details if applicable:

## 2025-04-03 ENCOUNTER — HOSPITAL ENCOUNTER (OUTPATIENT)
Facility: HOSPITAL | Age: 75
Setting detail: RECURRING SERIES
Discharge: HOME | End: 2025-04-06
Payer: MEDICARE

## 2025-04-03 PROCEDURE — 97140 MANUAL THERAPY 1/> REGIONS: CPT

## 2025-04-03 PROCEDURE — 97110 THERAPEUTIC EXERCISES: CPT

## 2025-04-03 NOTE — PROGRESS NOTES
Details if applicable:            Details if applicable:     42     Total Total         [x]  Patient Education billed concurrently with other procedures   [x] Review HEP    [] Progressed/Changed HEP, detail:    [] Other detail:     Access Code: [email or web address]  URL: [email or web address]  Date: 04/01/2025  Prepared by: Marbin Osullivan Jr    Exercises  - Clamshell with Resistance  - 1 x daily - 3 sets - 10 reps  - Standing Hip Extension with Resistance at Ankles and Counter Support  - 1 x daily - 3 sets - 10 reps  - Standing Hip Abduction with Resistance at Ankles and Counter Support  - 1 x daily - 3 sets - 10 reps  - Seated Knee Extension with Resistance  - 1 x daily - 3 sets - 10 reps      Other Objective/Functional Measures      Pain Level at end of session (0-10 scale): 0      Assessment   No adverse reaction to todays session. Remains compliant with HEP. We continue to focus on improving HS flexibility and strength.   Patient will continue to benefit from skilled PT / OT services to modify and progress therapeutic interventions, analyze and address functional mobility deficits, analyze and address ROM deficits, analyze and address strength deficits, and analyze and address soft tissue restrictions to address functional deficits and attain remaining goals.    Progress toward goals / Updated goals:  []  See Progress Note/Recertification    Short Term Goals: To be accomplished in 12 treatments.  IMPROVE RIGHT KNEE FLEX ROM TO 0 -110  SLS TKE KNEE EXT'N MMT TO 5/5  Long Term Goals: To be accomplished in 24 treatments.  RESTORE TO COMMUNITY AMBULATOR WITH STRONG STABLE RIGHT KNEE TO HANDLE UNEVEN GROUND, CURBS, COMMUNITY AMBULATING      PLAN  Yes  Continue plan of care  Re-Cert Due: 6/17/25  []  Upgrade activities as tolerated  []  Discharge due to:  []  Other:      MARBIN OSULLIVAN JR, PTA       4/3/2025       11:02 AM

## 2025-04-07 ENCOUNTER — HOSPITAL ENCOUNTER (OUTPATIENT)
Facility: HOSPITAL | Age: 75
Setting detail: RECURRING SERIES
Discharge: HOME | End: 2025-04-10
Payer: MEDICARE

## 2025-04-07 PROCEDURE — 97140 MANUAL THERAPY 1/> REGIONS: CPT

## 2025-04-07 PROCEDURE — 97110 THERAPEUTIC EXERCISES: CPT

## 2025-04-07 NOTE — PROGRESS NOTES
PHYSICAL THERAPY - MEDICARE DAILY TREATMENT NOTE (updated 3/23)      Date: 2025          Patient Name:  Clarissa Alonzo :  1950   Medical   Diagnosis:  Pain in right knee [M25.561] Treatment Diagnosis:  M25.561  RIGHT KNEE PAIN    Referral Source:  Hilario Randall MD Insurance:   Payor: MEDICARE / Plan: MEDICARE PART A AND B / Product Type: *No Product type* /                     Patient  verified yes     Visit #   Current  / Total 6 24   Time   In / Out 1 1:45   Total Treatment Time 45   Total Timed Codes 40   1:1 Treatment Time 40      [facility] Totals Reminder:  bill using total billable   min of TIMED therapeutic procedures and modalities.   8-22 min = 1 unit; 23-37 min = 2 units; 38-52 min = 3 units; 53-67 min = 4 units; 68-82 min = 5 units            SUBJECTIVE    Pain Level (0-10 scale): 4-5    Any medication changes, allergies to medications, adverse drug reactions, diagnosis change, or new procedure performed?: [x] No    [] Yes (see summary sheet for update)  Medications: Verified on Patient Summary List    Subjective functional status/changes:     Pt states that she has noticed some increased discomfort around her knee. Unsure of cause.    OBJECTIVE      Therapeutic Procedures:  Tx Min Billable or 1:1 Min (if diff from Tx Min) Procedure, Rationale, Specifics   30  92352 Therapeutic Exercise (timed):  increase ROM, strength, coordination, balance, and proprioception to improve patient's ability to progress to PLOF and address remaining functional goals. (see flow sheet as applicable)     Details if applicable:     10  65703 Manual Therapy (timed):  decrease pain, increase ROM, increase tissue extensibility, and decrease trigger points to improve patient's ability to progress to PLOF and address remaining functional goals.  The manual therapy interventions were performed at a separate and distinct time from the therapeutic activities interventions . (see flow sheet as applicable)     Details if

## 2025-04-10 ENCOUNTER — HOSPITAL ENCOUNTER (OUTPATIENT)
Facility: HOSPITAL | Age: 75
Setting detail: RECURRING SERIES
Discharge: HOME | End: 2025-04-13
Payer: MEDICARE

## 2025-04-10 PROCEDURE — 97110 THERAPEUTIC EXERCISES: CPT

## 2025-04-10 PROCEDURE — 97140 MANUAL THERAPY 1/> REGIONS: CPT

## 2025-04-10 NOTE — PROGRESS NOTES
PHYSICAL THERAPY - MEDICARE DAILY TREATMENT NOTE (updated 3/23)      Date: 4/10/2025          Patient Name:  Clarissa Alonzo :  1950   Medical   Diagnosis:  Pain in right knee [M25.561] Treatment Diagnosis:  M25.561  RIGHT KNEE PAIN    Referral Source:  Hilario Randall MD Insurance:   Payor: MEDICARE / Plan: MEDICARE PART A AND B / Product Type: *No Product type* /                     Patient  verified yes     Visit #   Current  / Total 7 24   Time   In / Out 1:10 1:55   Total Treatment Time 45   Total Timed Codes 43   1:1 Treatment Time 43      [facility] Totals Reminder:  bill using total billable   min of TIMED therapeutic procedures and modalities.   8-22 min = 1 unit; 23-37 min = 2 units; 38-52 min = 3 units; 53-67 min = 4 units; 68-82 min = 5 units            SUBJECTIVE    Pain Level (0-10 scale): 1    Any medication changes, allergies to medications, adverse drug reactions, diagnosis change, or new procedure performed?: [x] No    [] Yes (see summary sheet for update)  Medications: Verified on Patient Summary List    Subjective functional status/changes:     Pt reports more stiffness than pain today.     OBJECTIVE      Therapeutic Procedures:  Tx Min Billable or 1:1 Min (if diff from Tx Min) Procedure, Rationale, Specifics   35  14060 Therapeutic Exercise (timed):  increase ROM, strength, coordination, balance, and proprioception to improve patient's ability to progress to PLOF and address remaining functional goals. (see flow sheet as applicable)     Details if applicable:     8  02132 Manual Therapy (timed):  decrease pain, increase ROM, increase tissue extensibility, and decrease trigger points to improve patient's ability to progress to PLOF and address remaining functional goals.  The manual therapy interventions were performed at a separate and distinct time from the therapeutic activities interventions . (see flow sheet as applicable)     Details if applicable:  foam roll over HS in prone.

## 2025-05-12 ENCOUNTER — TRANSCRIBE ORDERS (OUTPATIENT)
Facility: HOSPITAL | Age: 75
End: 2025-05-12

## 2025-05-12 DIAGNOSIS — Z12.31 ENCOUNTER FOR SCREENING MAMMOGRAM FOR MALIGNANT NEOPLASM OF BREAST: Primary | ICD-10-CM

## 2025-05-14 ENCOUNTER — TRANSCRIBE ORDERS (OUTPATIENT)
Facility: HOSPITAL | Age: 75
End: 2025-05-14

## 2025-05-14 DIAGNOSIS — M81.0 OSTEOPOROSIS, UNSPECIFIED OSTEOPOROSIS TYPE, UNSPECIFIED PATHOLOGICAL FRACTURE PRESENCE: Primary | ICD-10-CM

## 2025-05-27 ENCOUNTER — HOSPITAL ENCOUNTER (OUTPATIENT)
Facility: HOSPITAL | Age: 75
Discharge: HOME | End: 2025-05-30
Payer: MEDICARE

## 2025-05-27 DIAGNOSIS — M81.0 OSTEOPOROSIS, UNSPECIFIED OSTEOPOROSIS TYPE, UNSPECIFIED PATHOLOGICAL FRACTURE PRESENCE: ICD-10-CM

## 2025-05-27 PROCEDURE — 77080 DXA BONE DENSITY AXIAL: CPT

## 2025-06-23 ENCOUNTER — HOSPITAL ENCOUNTER (OUTPATIENT)
Facility: HOSPITAL | Age: 75
Setting detail: RECURRING SERIES
Discharge: HOME | End: 2025-06-26
Payer: MEDICARE

## 2025-06-23 PROCEDURE — 97161 PT EVAL LOW COMPLEX 20 MIN: CPT

## 2025-06-23 PROCEDURE — 97110 THERAPEUTIC EXERCISES: CPT

## 2025-06-23 NOTE — THERAPY EVALUATION
[facility]  [address], [address]  [place] [address]  Phone: [phone]    Fax: [phone]         PHYSICAL THERAPY - MEDICARE EVALUATION/PLAN OF CARE NOTE (updated 3/23)      Date: 2025          Patient Name:  Clarissa Alonzo :  1950   Medical   Diagnosis:  Posterior tibial tendinitis, right leg [M76.821] Treatment Diagnosis:  M25.571 RIGHT ANKLE PAIN and pain in the joint of the right foot     Referral Source:  Clint Valdez PA-C Provider #:  [id number]                Insurance: Payor: MEDICARE / Plan: MEDICARE PART A AND B / Product Type: *No Product type* /      Patient  verified yes     Visit #   Current  / Total 1 24   Time   In / Out 1:45 pm 2:23 pm   Total Treatment Time 38   Total Timed Codes 14   1:1 Treatment Time 14      [facility] Totals Reminder:  bill using total billable   min of TIMED therapeutic procedures and modalities.   8-22 min = 1 unit; 23-37 min = 2 units; 38-52 min = 3 units;  53-67 min = 4 units; 68-82 min = 5 units           SUBJECTIVE  If an interpreting service was utilized for treatment of this patient, the contents of this document represent the material reviewed with the patient via the .     Pain Level (0-10 scale): 2  []constant []intermittent []improving []worsening []no change since onset    Any medication changes, allergies to medications, adverse drug reactions, diagnosis change, or new procedure performed?: [x] No    [] Yes (see summary sheet for update)  Medications: Verified on Patient Summary List    Subjective functional status/changes:     Pt presents with increased complaints of inner R ankle pain that started a few months ago with no known cause. Pt speculates it may have been from doing some yard work with a shovel, but is unsure. Pt has received x-rays. Pt had an injection on 25 which has helped with the pain. Pt has noticed feeling off balanced with walking because she is

## 2025-06-26 ENCOUNTER — HOSPITAL ENCOUNTER (OUTPATIENT)
Facility: HOSPITAL | Age: 75
Setting detail: RECURRING SERIES
Discharge: HOME | End: 2025-06-29
Payer: MEDICARE

## 2025-06-26 PROCEDURE — 97110 THERAPEUTIC EXERCISES: CPT

## 2025-06-30 ENCOUNTER — HOSPITAL ENCOUNTER (OUTPATIENT)
Facility: HOSPITAL | Age: 75
Setting detail: RECURRING SERIES
Discharge: HOME | End: 2025-07-03
Payer: MEDICARE

## 2025-06-30 PROCEDURE — 97110 THERAPEUTIC EXERCISES: CPT

## 2025-06-30 NOTE — PROGRESS NOTES
PHYSICAL THERAPY - MEDICARE DAILY TREATMENT NOTE (updated 3/23)      Date: 2025          Patient Name:  Clarissa Alonzo :  1950   Medical   Diagnosis:  Posterior tibial tendinitis, right leg [M76.821] Treatment Diagnosis:  M25.571 RIGHT ANKLE PAIN and pain in the joint of the right foot     Referral Source:  Clint Valdez PA-C Insurance:   Payor: MEDICARE / Plan: MEDICARE PART A AND B / Product Type: *No Product type* /                     Patient  verified yes     Visit #   Current  / Total 3 24   Time   In / Out 0802 0845   Total Treatment Time 45   Total Timed Codes 43   1:1 Treatment Time 43      [facility] Totals Reminder:  bill using total billable   min of TIMED therapeutic procedures and modalities.   8-22 min = 1 unit; 23-37 min = 2 units; 38-52 min = 3 units; 53-67 min = 4 units; 68-82 min = 5 units            SUBJECTIVE  If an interpreting service was utilized for treatment of this patient, the contents of this document represent the material reviewed with the patient via the .     Pain Level (0-10 scale): 2    Any medication changes, allergies to medications, adverse drug reactions, diagnosis change, or new procedure performed?: [x] No    [] Yes (see summary sheet for update)  Medications: Verified on Patient Summary List    Subjective functional status/changes:     \"It's painful and sore when I'm doing the exercises\"     OBJECTIVE      Therapeutic Procedures:  Tx Min Billable or 1:1 Min (if diff from Tx Min) Procedure, Rationale, Specifics   43  04779 Therapeutic Exercise (timed):  increase ROM, strength, coordination, balance, and proprioception to improve patient's ability to progress to PLOF and address remaining functional goals. (see flow sheet as applicable)     Details if applicable:            Details if applicable:           Details if applicable:           Details if applicable:            Details if applicable:     43     Total Total     [x]  Patient Education billed

## 2025-07-03 ENCOUNTER — HOSPITAL ENCOUNTER (OUTPATIENT)
Facility: HOSPITAL | Age: 75
Setting detail: RECURRING SERIES
Discharge: HOME | End: 2025-07-06
Payer: MEDICARE

## 2025-07-03 PROCEDURE — 97110 THERAPEUTIC EXERCISES: CPT

## 2025-07-03 NOTE — PROGRESS NOTES
PHYSICAL THERAPY - MEDICARE DAILY TREATMENT NOTE (updated 3/23)      Date: 7/3/2025          Patient Name:  Clarissa Alonzo :  1950   Medical   Diagnosis:  Posterior tibial tendinitis, right leg [M76.821] Treatment Diagnosis:  M25.571 RIGHT ANKLE PAIN and pain in the joint of the right foot     Referral Source:  Clint Valdez PA-C Insurance:   Payor: MEDICARE / Plan: MEDICARE PART A AND B / Product Type: *No Product type* /                     Patient  verified yes     Visit #   Current  / Total 4 24   Time   In / Out 1301 1345   Total Treatment Time 44   Total Timed Codes 40   1:1 Treatment Time 40      [facility] Totals Reminder:  bill using total billable   min of TIMED therapeutic procedures and modalities.   8-22 min = 1 unit; 23-37 min = 2 units; 38-52 min = 3 units; 53-67 min = 4 units; 68-82 min = 5 units            SUBJECTIVE  If an interpreting service was utilized for treatment of this patient, the contents of this document represent the material reviewed with the patient via the .     Pain Level (0-10 scale): 1    Any medication changes, allergies to medications, adverse drug reactions, diagnosis change, or new procedure performed?: [x] No    [] Yes (see summary sheet for update)  Medications: Verified on Patient Summary List    Subjective functional status/changes:     Patient states she is \"feeling okay\" at start of session. No further updates at this time.     OBJECTIVE      Therapeutic Procedures:  Tx Min Billable or 1:1 Min (if diff from Tx Min) Procedure, Rationale, Specifics   40  23807 Therapeutic Exercise (timed):  increase ROM, strength, coordination, balance, and proprioception to improve patient's ability to progress to PLOF and address remaining functional goals. (see flow sheet as applicable)     Details if applicable:            Details if applicable:           Details if applicable:           Details if applicable:            Details if applicable:     40     Total Total

## 2025-07-08 ENCOUNTER — HOSPITAL ENCOUNTER (OUTPATIENT)
Facility: HOSPITAL | Age: 75
Setting detail: RECURRING SERIES
Discharge: HOME | End: 2025-07-11
Payer: MEDICARE

## 2025-07-08 PROCEDURE — 97110 THERAPEUTIC EXERCISES: CPT

## 2025-07-08 PROCEDURE — 97112 NEUROMUSCULAR REEDUCATION: CPT

## 2025-07-08 NOTE — PROGRESS NOTES
PHYSICAL THERAPY - MEDICARE DAILY TREATMENT NOTE (updated 3/23)      Date: 2025          Patient Name:  Clarissa Alonzo :  1950   Medical   Diagnosis:  Posterior tibial tendinitis, right leg [M76.821] Treatment Diagnosis:  M25.571 RIGHT ANKLE PAIN and pain in the joint of the right foot     Referral Source:  Clint Valdez PA-C Insurance:   Payor: MEDICARE / Plan: MEDICARE PART A AND B / Product Type: *No Product type* /                     Patient  verified yes     Visit #   Current  / Total 5 24   Time   In / Out 930 1015   Total Treatment Time 45   Total Timed Codes 41   1:1 Treatment Time 41      [facility] Totals Reminder:  bill using total billable   min of TIMED therapeutic procedures and modalities.   8-22 min = 1 unit; 23-37 min = 2 units; 38-52 min = 3 units; 53-67 min = 4 units; 68-82 min = 5 units            SUBJECTIVE  If an interpreting service was utilized for treatment of this patient, the contents of this document represent the material reviewed with the patient via the .     Pain Level (0-10 scale): 1    Any medication changes, allergies to medications, adverse drug reactions, diagnosis change, or new procedure performed?: [x] No    [] Yes (see summary sheet for update)  Medications: Verified on Patient Summary List    Subjective functional status/changes:     Patient states she is \"feeling okay\" at start of session. No further updates at this time.     OBJECTIVE      Therapeutic Procedures:  Tx Min Billable or 1:1 Min (if diff from Tx Min) Procedure, Rationale, Specifics   14  68783 Neuromuscular Re-Education (timed):  improve balance, coordination, kinesthetic sense, posture, core stability and proprioception to improve patient's ability to develop conscious control of individual muscles and awareness of position of extremities in order to progress to PLOF and address remaining functional goals. (see flow sheet as applicable)     Details if applicable:     27   36498

## 2025-07-10 ENCOUNTER — HOSPITAL ENCOUNTER (OUTPATIENT)
Facility: HOSPITAL | Age: 75
Setting detail: RECURRING SERIES
Discharge: HOME | End: 2025-07-13
Payer: MEDICARE

## 2025-07-10 PROCEDURE — 97110 THERAPEUTIC EXERCISES: CPT

## 2025-07-10 NOTE — PROGRESS NOTES
PHYSICAL THERAPY - MEDICARE DAILY TREATMENT NOTE (updated 3/23)      Date: 7/10/2025          Patient Name:  Clarissa Alonzo :  1950   Medical   Diagnosis:  Posterior tibial tendinitis, right leg [M76.821] Treatment Diagnosis:  M25.571 RIGHT ANKLE PAIN and pain in the joint of the right foot     Referral Source:  Clint Valdez PA-C Insurance:   Payor: MEDICARE / Plan: MEDICARE PART A AND B / Product Type: *No Product type* /                     Patient  verified yes     Visit #   Current  / Total 6 24   Time   In / Out 930 1015   Total Treatment Time 45   Total Timed Codes 40   1:1 Treatment Time 40      [facility] Totals Reminder:  bill using total billable   min of TIMED therapeutic procedures and modalities.   8-22 min = 1 unit; 23-37 min = 2 units; 38-52 min = 3 units; 53-67 min = 4 units; 68-82 min = 5 units            SUBJECTIVE  If an interpreting service was utilized for treatment of this patient, the contents of this document represent the material reviewed with the patient via the .     Pain Level (0-10 scale): 2    Any medication changes, allergies to medications, adverse drug reactions, diagnosis change, or new procedure performed?: [x] No    [] Yes (see summary sheet for update)  Medications: Verified on Patient Summary List    Subjective functional status/changes:     \"Today will be my last. Ill continue at home\"    OBJECTIVE      Therapeutic Procedures:  Tx Min Billable or 1:1 Min (if diff from Tx Min) Procedure, Rationale, Specifics     73650 Neuromuscular Re-Education (timed):  improve balance, coordination, kinesthetic sense, posture, core stability and proprioception to improve patient's ability to develop conscious control of individual muscles and awareness of position of extremities in order to progress to PLOF and address remaining functional goals. (see flow sheet as applicable)     Details if applicable:     40   92703 Therapeutic Exercise (timed):  increase ROM, strength,

## 2025-07-28 ENCOUNTER — HOSPITAL ENCOUNTER (OUTPATIENT)
Facility: HOSPITAL | Age: 75
Discharge: HOME | End: 2025-07-31
Payer: MEDICARE

## 2025-07-28 DIAGNOSIS — Z12.31 ENCOUNTER FOR SCREENING MAMMOGRAM FOR MALIGNANT NEOPLASM OF BREAST: ICD-10-CM

## 2025-07-28 PROCEDURE — 77063 BREAST TOMOSYNTHESIS BI: CPT

## (undated) DEVICE — SOLUTION IV 1000ML 0.9% SOD CHL

## (undated) DEVICE — GOWN,AURORA,FABRIC-REINFORCED,X-LARGE: Brand: MEDLINE

## (undated) DEVICE — INFECTION CONTROL KIT SYS

## (undated) DEVICE — WRAP KNEE UNIV E STRP HK AND LOOP W/ GEL PK MEDCOOL

## (undated) DEVICE — SUTURE MCRYL SZ 5-0 L18IN ABSRB UD L19MM PS-2 3/8 CIR PRIM Y495G

## (undated) DEVICE — SUT VCRL + 2-0 27IN CT2 UD -- 36/BX

## (undated) DEVICE — SUTURE MCRYL SZ 3-0 L27IN ABSRB UD L19MM PS-2 3/8 CIR PRIM Y427H

## (undated) DEVICE — (D)HANDPIECE IRR W/HI FLO TIP -- DUPLICATE USE ITEM 121586

## (undated) DEVICE — Z DISCONTINUED PER MEDLINE PACK PROCEDURE SURG PLAS

## (undated) DEVICE — DRAPE FLD WRM W44XL66IN C6L FOR INTRATEMP SYS THERMABASIN

## (undated) DEVICE — COVER LT HNDL PLAS RIG 1 PER PK

## (undated) DEVICE — SPONGE LAP W18XL18IN WHT COT 4 PLY FLD STRUNG RADPQ DISP ST

## (undated) DEVICE — DRAIN SURG 19FR 100% SIL RADPQ RND CHN FULL FLUT

## (undated) DEVICE — DRSG KT VAC CLSR VERSA SM WHT --

## (undated) DEVICE — SOLUTION SCRB 2OZ 10% POVIDONE IOD ANTIMIC BTL

## (undated) DEVICE — TUBING SUCT L9FT FOR AUTOFUSE INFLTR SYS

## (undated) DEVICE — RESERVOIR,SUCTION,100CC,SILICONE: Brand: MEDLINE

## (undated) DEVICE — STERILE POLYISOPRENE POWDER-FREE SURGICAL GLOVES: Brand: PROTEXIS

## (undated) DEVICE — INTENDED FOR TISSUE SEPARATION, AND OTHER PROCEDURES THAT REQUIRE A SHARP SURGICAL BLADE TO PUNCTURE OR CUT.: Brand: BARD-PARKER SAFETY BLADES SIZE 10, STERILE

## (undated) DEVICE — 3 BONE CEMENT MIXER: Brand: MIXEVAC

## (undated) DEVICE — COVER,TABLE,HEAVY DUTY,77"X90",STRL: Brand: MEDLINE

## (undated) DEVICE — TUBING, SUCTION, 1/4" X 10', STRAIGHT: Brand: MEDLINE

## (undated) DEVICE — CANISTER, RIGID, 3000CC: Brand: MEDLINE INDUSTRIES, INC.

## (undated) DEVICE — TOTAL KNEE PACK: Brand: MEDLINE INDUSTRIES, INC.

## (undated) DEVICE — STERILE POLYISOPRENE POWDER-FREE SURGICAL GLOVES WITH EMOLLIENT COATING: Brand: PROTEXIS

## (undated) DEVICE — DRAPE,TOP,102X53,STERILE: Brand: MEDLINE

## (undated) DEVICE — POWDER HEMOSTAT GEL 3.0GR -- SURGICEL

## (undated) DEVICE — PLASMABLADE PS210-030S 3.0S LOCK: Brand: PLASMABLADE™

## (undated) DEVICE — GLOVE ORANGE PI 8 1/2   MSG9085

## (undated) DEVICE — BLADE SAW W12.5XL70MM THK1MM RECIP DBL SIDE OFFSET

## (undated) DEVICE — 3M™ TEGADERM™ HP TRANSPARENT FILM DRESSING FRAME STYLE, 9546HP, 4 IN X 4-1/2 IN (10 CM X 11.5 CM), 50/CT 4CT/CASE: Brand: 3M™ TEGADERM™

## (undated) DEVICE — SUPPORT MAMM SURG 48-50 IN 2XL BRA

## (undated) DEVICE — CONTAINER,SPECIMEN,3OZ,OR STRL: Brand: MEDLINE

## (undated) DEVICE — DRAPE,CHEST,FENES,15X10,STERIL: Brand: MEDLINE

## (undated) DEVICE — SUTURE VCRL SZ 3-0 L27IN ABSRB UD L24MM PS-1 3/8 CIR PRIM J936H

## (undated) DEVICE — GLOVE SURG UNDERGLOVE 7.5 PF BLU BIOGEL PI MIC LF

## (undated) DEVICE — GLOVE ORANGE PI 7   MSG9070

## (undated) DEVICE — REM POLYHESIVE ADULT PATIENT RETURN ELECTRODE: Brand: VALLEYLAB

## (undated) DEVICE — SUT PROL 0 30IN CT1 BLU --

## (undated) DEVICE — GLOVE SURG 7 BIOGEL PI ULTRATOUCH G

## (undated) DEVICE — STRAP,POSITIONING,KNEE/BODY,FOAM,4X60": Brand: MEDLINE

## (undated) DEVICE — PREP SKN CHLRAPRP APL 26ML STR --

## (undated) DEVICE — BANDAGE COBAN 4 IN COMPR W4INXL5YD FOAM COHESIVE QUIK STK SELF ADH SFT

## (undated) DEVICE — COVER,MAYO STAND,STERILE: Brand: MEDLINE

## (undated) DEVICE — SOLUTION LACTATED RINGERS INJECTION USP

## (undated) DEVICE — HOOD, PEEL-AWAY: Brand: FLYTE

## (undated) DEVICE — TRAY PREP DRY W/ PREM GLV 2 APPL 6 SPNG 2 UNDPD 1 OVERWRAP

## (undated) DEVICE — 4-PORT MANIFOLD: Brand: NEPTUNE 2

## (undated) DEVICE — GUIDEPIN ORTH THRD HI PERF HD SIG

## (undated) DEVICE — SUTURE MCRYL SZ 5-0 L18IN ABSRB UD L13MM P-3 3/8 CIR PRIM Y493G

## (undated) DEVICE — ZIMMER® STERILE DISPOSABLE TOURNIQUET CUFF WITH PLC, DUAL PORT, SINGLE BLADDER, 34 IN. (86 CM)

## (undated) DEVICE — BLANKET WRM W35.4XL86.6IN FULL UNDERBODY + FORC AIR

## (undated) DEVICE — SPONGE GZ W4XL4IN COT 12 PLY TYP VII WVN C FLD DSGN

## (undated) DEVICE — SUT VCRL + 1 27IN CT1 UD --

## (undated) DEVICE — SHEET,DRAPE,70X100,STERILE: Brand: MEDLINE

## (undated) DEVICE — TOTAL TRAY, DB, 100% SILI FOLEY, 16FR 10: Brand: MEDLINE

## (undated) DEVICE — PICO 7 15CM X 15CM: Brand: PICO™ 7

## (undated) DEVICE — GLOVE SURG SZ 75 L12IN FNGR THK79MIL GRN LTX FREE

## (undated) DEVICE — BLADE ELECTRODE: Brand: VALLEYLAB

## (undated) DEVICE — PIN DRL QUIK HI PERF FOR SIG SYS

## (undated) DEVICE — SUTURE ABSORBABLE BRAIDED 0 CT 36 IN DA UD VICRYL VCP958H

## (undated) DEVICE — STAPLER SKIN H3.9MM WIRE DIA0.58MM CRWN 6.9MM 35 STPL ROT

## (undated) DEVICE — HYPODERMIC SAFETY NEEDLE: Brand: MAGELLAN

## (undated) DEVICE — DRESSING ANTIMIC FOAM OPTIFOAM POSTOP ADH 4X14 IN

## (undated) DEVICE — STRYKER PERFORMANCE SERIES SAGITTAL BLADE: Brand: STRYKER PERFORMANCE SERIES

## (undated) DEVICE — SUTURE MCRYL SZ 2-0 L27IN ABSRB UD SH L26MM TAPERPOINT NDL Y417H

## (undated) DEVICE — 450 ML BOTTLE OF 0.05% CHLORHEXIDINE GLUCONATE IN 99.95% STERILE WATER FOR IRRIGATION, USP AND APPLICATOR.: Brand: IRRISEPT ANTIMICROBIAL WOUND LAVAGE

## (undated) DEVICE — CURITY NON-ADHERENT STRIPS: Brand: CURITY

## (undated) DEVICE — GARMENT COMPR M FOR 13IN FT INTMIT SGL BLDR HEM FORC II

## (undated) DEVICE — 3M™ TEGADERM™ TRANSPARENT FILM DRESSING FRAME STYLE, 1626W, 4 IN X 4-3/4 IN (10 CM X 12 CM), 50/CT 4CT/CASE: Brand: 3M™ TEGADERM™

## (undated) DEVICE — AEGIS 1" DISK 4MM HOLE, PEEL OPEN: Brand: MEDLINE

## (undated) DEVICE — SKIN CLOS DERMABND PRINEO 60CM -- DERMABOUND PRINEO

## (undated) DEVICE — DERMABOND SKIN ADH 0.7ML -- DERMABOND ADVANCED 12/BX

## (undated) DEVICE — SOL IRR NACL 0.9% 500ML POUR --

## (undated) DEVICE — SUTURE VCRL SZ 0 L27IN ABSRB UD SH L26MM 1/2 CIR TAPERPOINT J418H

## (undated) DEVICE — GLOVE ORANGE PI 7 1/2   MSG9075

## (undated) DEVICE — 3M™ IOBAN™ 2 ANTIMICROBIAL INCISE DRAPE 6650EZ: Brand: IOBAN™ 2

## (undated) DEVICE — BNDG,ELSTC,MATRIX,STRL,6"X5YD,LF,HOOK&LP: Brand: MEDLINE

## (undated) DEVICE — Z DISCONTINUED USE (MFG CAT 7984-37) SOLUTION IV SODIUM CHL 0.9% 100 ML INJ

## (undated) DEVICE — SUT ETHLN 3-0 18IN PS2 BLK --